# Patient Record
Sex: MALE | Race: WHITE | NOT HISPANIC OR LATINO | Employment: OTHER | URBAN - NONMETROPOLITAN AREA
[De-identification: names, ages, dates, MRNs, and addresses within clinical notes are randomized per-mention and may not be internally consistent; named-entity substitution may affect disease eponyms.]

---

## 2017-09-13 ENCOUNTER — HOSPITAL ENCOUNTER (OUTPATIENT)
Dept: GENERAL RADIOLOGY | Facility: HOSPITAL | Age: 52
Discharge: HOME | End: 2017-09-13
Admitting: FAMILY MEDICINE

## 2017-09-13 DIAGNOSIS — J40 BRONCHITIS: ICD-10-CM

## 2017-09-13 PROCEDURE — 71020 HC CHEST PA AND LATERAL: CPT

## 2017-09-13 PROCEDURE — 71020 XR CHEST PA AND LATERAL: CPT | Performed by: RADIOLOGY

## 2017-11-17 ENCOUNTER — OFFICE VISIT (OUTPATIENT)
Dept: NEUROSURGERY | Facility: CLINIC | Age: 52
End: 2017-11-17

## 2017-11-17 VITALS — BODY MASS INDEX: 39.65 KG/M2 | TEMPERATURE: 98 F | HEIGHT: 70 IN | WEIGHT: 277 LBS

## 2017-11-17 DIAGNOSIS — G89.29 CHRONIC BILATERAL LOW BACK PAIN, WITH SCIATICA PRESENCE UNSPECIFIED: ICD-10-CM

## 2017-11-17 DIAGNOSIS — Z98.890 HISTORY OF LUMBAR SURGERY: ICD-10-CM

## 2017-11-17 DIAGNOSIS — M51.36 DEGENERATIVE DISC DISEASE, LUMBAR: Primary | ICD-10-CM

## 2017-11-17 DIAGNOSIS — M54.5 CHRONIC BILATERAL LOW BACK PAIN, WITH SCIATICA PRESENCE UNSPECIFIED: ICD-10-CM

## 2017-11-17 PROBLEM — M54.50 CHRONIC BILATERAL LOW BACK PAIN: Status: ACTIVE | Noted: 2017-11-17

## 2017-11-17 PROCEDURE — 99203 OFFICE O/P NEW LOW 30 MIN: CPT | Performed by: NEUROLOGICAL SURGERY

## 2017-11-17 RX ORDER — LORATADINE 10 MG/1
CAPSULE, LIQUID FILLED ORAL
COMMUNITY
End: 2017-11-17 | Stop reason: SDUPTHER

## 2017-11-17 RX ORDER — METOPROLOL SUCCINATE 25 MG/1
25 TABLET, EXTENDED RELEASE ORAL DAILY
COMMUNITY
End: 2019-04-09 | Stop reason: SDUPTHER

## 2017-11-17 RX ORDER — HYDROGEN PEROXIDE 2.65 ML/100ML
LIQUID ORAL; TOPICAL
COMMUNITY
Start: 2017-10-25 | End: 2019-02-25 | Stop reason: SDUPTHER

## 2017-11-17 RX ORDER — ISOSORBIDE MONONITRATE 30 MG/1
30 TABLET, EXTENDED RELEASE ORAL DAILY
COMMUNITY
End: 2018-07-19

## 2017-11-17 RX ORDER — AMLODIPINE BESYLATE AND BENAZEPRIL HYDROCHLORIDE 5; 10 MG/1; MG/1
1 CAPSULE ORAL DAILY
COMMUNITY
End: 2018-07-19

## 2017-11-17 RX ORDER — LISINOPRIL AND HYDROCHLOROTHIAZIDE 25; 20 MG/1; MG/1
TABLET ORAL
COMMUNITY
Start: 2017-10-25 | End: 2018-10-18

## 2017-11-17 RX ORDER — RABEPRAZOLE SODIUM 20 MG/1
20 TABLET, DELAYED RELEASE ORAL DAILY
COMMUNITY
End: 2019-04-09 | Stop reason: SDUPTHER

## 2017-11-17 RX ORDER — AMITRIPTYLINE HYDROCHLORIDE 25 MG/1
25 TABLET, FILM COATED ORAL NIGHTLY
COMMUNITY
End: 2017-11-17 | Stop reason: SDUPTHER

## 2017-11-17 RX ORDER — CLOPIDOGREL BISULFATE 75 MG/1
75 TABLET ORAL DAILY
COMMUNITY
End: 2017-11-17 | Stop reason: SDUPTHER

## 2017-11-17 RX ORDER — ASPIRIN 325 MG
325 TABLET ORAL DAILY
COMMUNITY
End: 2017-11-17 | Stop reason: SDUPTHER

## 2017-11-17 RX ORDER — ALBUTEROL SULFATE 2.5 MG/3ML
2.5 SOLUTION RESPIRATORY (INHALATION) EVERY 4 HOURS PRN
COMMUNITY
End: 2019-04-09 | Stop reason: SDUPTHER

## 2017-11-17 RX ORDER — DOXEPIN HYDROCHLORIDE 25 MG/1
CAPSULE ORAL
COMMUNITY
Start: 2017-10-14 | End: 2018-07-19

## 2017-11-17 RX ORDER — ROSUVASTATIN CALCIUM 10 MG/1
10 TABLET, COATED ORAL DAILY
COMMUNITY
End: 2019-04-09 | Stop reason: SDUPTHER

## 2017-11-17 RX ORDER — ALBUTEROL SULFATE 90 UG/1
AEROSOL, METERED RESPIRATORY (INHALATION)
COMMUNITY
Start: 2017-10-14 | End: 2019-04-09 | Stop reason: SDUPTHER

## 2017-11-17 RX ORDER — RANOLAZINE 500 MG/1
500 TABLET, EXTENDED RELEASE ORAL 2 TIMES DAILY
COMMUNITY
End: 2019-04-09 | Stop reason: SDUPTHER

## 2017-11-17 RX ORDER — ONDANSETRON 4 MG/1
4 TABLET, ORALLY DISINTEGRATING ORAL EVERY 8 HOURS PRN
COMMUNITY
End: 2021-03-02

## 2017-11-17 RX ORDER — LISINOPRIL 20 MG/1
20 TABLET ORAL DAILY
COMMUNITY
End: 2017-11-17 | Stop reason: SDUPTHER

## 2017-11-17 RX ORDER — AMLODIPINE BESYLATE 5 MG/1
TABLET ORAL
COMMUNITY
Start: 2017-10-25 | End: 2019-04-09 | Stop reason: SDUPTHER

## 2017-11-17 RX ORDER — LORATADINE 10 MG/1
TABLET ORAL
COMMUNITY
Start: 2017-11-16 | End: 2019-04-09

## 2017-11-17 NOTE — PROGRESS NOTES
Dipak Isis  1965  [id number]      Chief Complaint   Patient presents with   • Numbness       HISTORY OF PRESENT ILLNESS:  [This is a 52-year-old male who has had 8 surgeries performed on his lumbar spine since 1990.  This includes both a posterior and anterior stabilization with pedicle screws and interbody devices.  He is had a epidural steroid injection which has not helped.  He is had a spinal cord stimulator which has not helped.  This was withdrawn and his symptoms have gotten progressively worse.  He has severe unrelenting back pain.  He has hyperesthesia in both lower extremities which she is had for some time.  He attended a pain management service in [place] which treated him quite well with medication.  He is relocated to [place].  He is referred for neurosurgical consultation. ]    Past Medical History:   Diagnosis Date   • Anxiety    • GENTILE (chronic airflow obstruction)    • DDD (degenerative disc disease), cervical    • Depression    • Dyslipidemia    • Hypertension    • Insomnia    • Spinal stenosis        Past Surgical History:   Procedure Laterality Date   • BACK SURGERY  1990    (total of 8 back surgeries) [facility]   • CORONARY ANGIOPLASTY WITH STENT PLACEMENT     • NECK SURGERY     • OTHER SURGICAL HISTORY      elbow surgery   • SPINAL CORD STIMULATOR IMPLANT  2015    and removal   • TOTAL KNEE ARTHROPLASTY         Family History   Problem Relation Age of Onset   • Heart disease Mother    • Heart disease Father    • Diabetes Father        Social History     Social History   • Marital status:      Spouse name: N/A   • Number of children: N/A   • Years of education: N/A     Occupational History   • Not on file.     Social History Main Topics   • Smoking status: Current Every Day Smoker     Packs/day: 2.00   • Smokeless tobacco: Never Used   • Alcohol use Not on file   • Drug use: Not on file   • Sexual activity: Defer     Other Topics Concern   • Not on file     Social History  Narrative       Allergies   Allergen Reactions   • Penicillins    • Tape      silk         Current Outpatient Prescriptions:   •  albuterol (PROVENTIL) (2.5 MG/3ML) 0.083% nebulizer solution, Take 2.5 mg by nebulization Every 4 (Four) Hours As Needed for Wheezing., Disp: , Rfl:   •  amLODIPine (NORVASC) 5 MG tablet, , Disp: , Rfl:   •  amLODIPine-benazepril (LOTREL 5-10) 5-10 MG per capsule, Take 1 capsule by mouth Daily., Disp: , Rfl:   •  doxepin (SINEquan) 25 MG capsule, , Disp: , Rfl:   •  EQ ASPIRIN ADULT LOW DOSE 81 MG EC tablet, , Disp: , Rfl:   •  isosorbide mononitrate (IMDUR) 30 MG 24 hr tablet, Take 30 mg by mouth Daily., Disp: , Rfl:   •  lisinopril-hydrochlorothiazide (PRINZIDE,ZESTORETIC) 20-25 MG per tablet, , Disp: , Rfl:   •  loratadine (CLARITIN) 10 MG tablet, , Disp: , Rfl:   •  metoprolol succinate XL (TOPROL-XL) 25 MG 24 hr tablet, Take 25 mg by mouth Daily., Disp: , Rfl:   •  ondansetron ODT (ZOFRAN-ODT) 4 MG disintegrating tablet, Take 4 mg by mouth Every 8 (Eight) Hours As Needed for Nausea or Vomiting., Disp: , Rfl:   •  RABEprazole (ACIPHEX) 20 MG EC tablet, Take 20 mg by mouth Daily., Disp: , Rfl:   •  ranolazine (RANEXA) 500 MG 12 hr tablet, Take 500 mg by mouth 2 (Two) Times a Day., Disp: , Rfl:   •  rosuvastatin (CRESTOR) 10 MG tablet, Take 10 mg by mouth Daily., Disp: , Rfl:   •  VENTOLIN  (90 Base) MCG/ACT inhaler, , Disp: , Rfl:     Review of Systems   Constitutional: Negative for activity change, appetite change, chills, diaphoresis, fatigue, fever and unexpected weight change.   HENT: Negative for congestion, dental problem, drooling, ear discharge, ear pain, facial swelling, hearing loss, mouth sores, nosebleeds, postnasal drip, rhinorrhea, sinus pressure, sneezing, sore throat, tinnitus, trouble swallowing and voice change.    Eyes: Negative for photophobia, pain, discharge, redness, itching and visual disturbance.   Respiratory: Negative for apnea, cough, choking, chest  "tightness, shortness of breath, wheezing and stridor.    Cardiovascular: Negative for chest pain, palpitations and leg swelling.   Gastrointestinal: Negative for abdominal distention, abdominal pain, anal bleeding, blood in stool, constipation, diarrhea, nausea, rectal pain and vomiting.   Endocrine: Negative for cold intolerance, heat intolerance, polydipsia, polyphagia and polyuria.   Genitourinary: Negative for decreased urine volume, difficulty urinating, dysuria, enuresis, flank pain, frequency, genital sores, hematuria and urgency.   Musculoskeletal: Positive for back pain, neck pain and neck stiffness. Negative for arthralgias, gait problem, joint swelling and myalgias.   Skin: Negative for color change, pallor, rash and wound.   Allergic/Immunologic: Negative for environmental allergies, food allergies and immunocompromised state.   Neurological: Positive for weakness, numbness and headaches. Negative for dizziness, tremors, seizures, syncope, facial asymmetry, speech difficulty and light-headedness.   Hematological: Negative for adenopathy. Does not bruise/bleed easily.   Psychiatric/Behavioral: Negative for agitation, behavioral problems, confusion, decreased concentration, dysphoric mood, hallucinations, self-injury, sleep disturbance and suicidal ideas. The patient is not nervous/anxious and is not hyperactive.        Vitals:    11/17/17 1012   Temp: 98 °F (36.7 °C)   Weight: 277 lb (126 kg)   Height: 70\" (177.8 cm)       Neurological Examination:  Mental status/speech: The patient is alert and oriented.  Speech is clear without aphysia or dysarthria.  No overt cognitive deficits.    Cranial nerve examination:    Olfaction: Smell is intact.  Vision: Vision is intact without visual field abnormalities.  Funduscopic examination is normal.  No pupillary irregularity.  Ocular motor examination: The extraocular muscles are intact.  There is no diplopia.  The pupil is round and reactive to both light and " accommodation.  There is no nystagmus.  Facial movement/sensation: There is no facial weakness.  Sensation is intact in the first, second, and third divisions of the trigeminal nerve.  The corneal reflex is intact.  Auditory: Hearing is intact to finger rub bilaterally.  Cranial nerves IX, X, XI, XII: Phonation is normal.  No dysphagia.  Tongue is protruded in the midline without atrophy.  The gag reflex is intact.  Shoulder shrug is normal.    Musculoligamentous ligamentous examination: He is slightly obese with limitation of range of motion.  He walks in a flexed position.  Straight leg raising Crandall flip Scovill test are negative however.  His strength is intact.  He has hyperesthesia in both lower extremities.  The Achilles reflex are absent.  The patellar reflexes are intact.    Medical Decision Making:     Diagnostic Data Set:  I have reviewed cervical sets of lumbar MRI.  He is had spinal fusion from L4 through the sacrum both anteriorly and inferiorly.  He has mild spinal stenosis at L3-L4 and L2-L3.      Assessment:  Postlaminectomy syndrome          Recommendations:  In my opinion he does not have a condition that can be helped with spinal surgery.  I would strongly recommend referral to pain management for pharmacological management of his pain.  I do not think there is an another viable option at this time that assures him of any significant improvement.        I greatly appreciate the opportunity to see and evaluate this individual.  If you have questions or concerns regarding issues that I may have overlooked please call me at any time: [phone].  Shayne Lam M.D.  [facility]  [address] [place] [address]    Scribed for Yefri Lam MD by Lei Young CMA. 11/17/2017  10:44 AM     I have read and concur with the information provided by the scribe.  Yefri Lam MD

## 2018-07-19 ENCOUNTER — OFFICE VISIT (OUTPATIENT)
Dept: SURGERY | Facility: CLINIC | Age: 53
End: 2018-07-19

## 2018-07-19 VITALS
RESPIRATION RATE: 18 BRPM | WEIGHT: 299.6 LBS | HEIGHT: 70 IN | BODY MASS INDEX: 42.89 KG/M2 | SYSTOLIC BLOOD PRESSURE: 125 MMHG | HEART RATE: 81 BPM | DIASTOLIC BLOOD PRESSURE: 77 MMHG | TEMPERATURE: 98.6 F | OXYGEN SATURATION: 95 %

## 2018-07-19 DIAGNOSIS — R19.00 ABDOMINAL WALL BULGE: ICD-10-CM

## 2018-07-19 DIAGNOSIS — R10.84 GENERALIZED ABDOMINAL PAIN: Primary | ICD-10-CM

## 2018-07-19 PROCEDURE — 99243 OFF/OP CNSLTJ NEW/EST LOW 30: CPT | Performed by: SURGERY

## 2018-07-19 RX ORDER — NITROGLYCERIN 80 MG/1
1 PATCH TRANSDERMAL DAILY
COMMUNITY
End: 2018-10-18

## 2018-07-19 RX ORDER — FLUOXETINE 10 MG/1
10 CAPSULE ORAL DAILY
COMMUNITY
End: 2018-11-01 | Stop reason: SDUPTHER

## 2018-07-19 RX ORDER — CLOPIDOGREL BISULFATE 75 MG/1
75 TABLET ORAL DAILY
COMMUNITY
End: 2019-04-09 | Stop reason: SDUPTHER

## 2018-07-19 NOTE — PROGRESS NOTES
7/19/2018    Patient Information  Dipak Marinelli  [address] [place] [address] [place] [address]  1965  [phone] (home)     Chief Complaint   Patient presents with   • Hernia       HPI  Patient is a 52-year-old white male who reports he thinks he has a hernia in his abdominal wall.  He says there is a knuckle stuff that sticks out.  In its getting bigger.  He saw Dr. Arana about this within the last 6 months.  He reports that Dr. Arana obtain a CAT scan and told him he had an inguinal hernia but there is no hernia in his abdominal wall.  Patient cyst that is not there and that its getting bigger since he saw her area    Review of Systems   Constitutional: Positive for unexpected weight change.   HENT: Negative.    Eyes: Positive for visual disturbance.   Gastrointestinal: Positive for abdominal pain.   Endocrine: Negative.    Genitourinary: Negative.    Musculoskeletal: Positive for back pain and joint swelling.   Skin: Negative.    Allergic/Immunologic: Negative.    Neurological: Positive for numbness.   Hematological: Bruises/bleeds easily.   Psychiatric/Behavioral: Positive for sleep disturbance. The patient is nervous/anxious.      The Review of Systems has been reviewed and confirmed.    Patient Active Problem List   Diagnosis   • Degenerative disc disease, lumbar   • History of lumbar surgery   • Chronic bilateral low back pain         Past Medical History:   Diagnosis Date   • Anxiety    • Cancer (CMS/HCC)    • GENTILE (chronic airflow obstruction) (CMS/HCC)    • DDD (degenerative disc disease), cervical    • Depression    • Dyslipidemia    • Hypertension    • Insomnia    • Spinal stenosis          Past Surgical History:   Procedure Laterality Date   • BACK SURGERY  1990    (total of 8 back surgeries) [facility]   • CORONARY ANGIOPLASTY WITH STENT PLACEMENT     • NECK SURGERY     • OTHER SURGICAL HISTORY      elbow surgery   • SPINAL CORD STIMULATOR IMPLANT  2015    and removal   • TOTAL KNEE ARTHROPLASTY    "        Family History   Problem Relation Age of Onset   • Heart disease Mother    • Heart disease Father    • Diabetes Father          Social History   Substance Use Topics   • Smoking status: Current Every Day Smoker     Packs/day: 2.00   • Smokeless tobacco: Never Used   • Alcohol use Not on file       Current Outpatient Prescriptions   Medication Sig Dispense Refill   • albuterol (PROVENTIL) (2.5 MG/3ML) 0.083% nebulizer solution Take 2.5 mg by nebulization Every 4 (Four) Hours As Needed for Wheezing.     • amLODIPine (NORVASC) 5 MG tablet      • clopidogrel (PLAVIX) 75 MG tablet Take 75 mg by mouth Daily.     • EQ ASPIRIN ADULT LOW DOSE 81 MG EC tablet      • FLUoxetine (PROzac) 10 MG capsule Take 10 mg by mouth Daily.     • lisinopril-hydrochlorothiazide (PRINZIDE,ZESTORETIC) 20-25 MG per tablet      • loratadine (CLARITIN) 10 MG tablet      • metoprolol succinate XL (TOPROL-XL) 25 MG 24 hr tablet Take 25 mg by mouth Daily.     • nitroglycerin (NITRODUR) 0.4 MG/HR patch Place 1 patch on the skin Daily.     • ondansetron ODT (ZOFRAN-ODT) 4 MG disintegrating tablet Take 4 mg by mouth Every 8 (Eight) Hours As Needed for Nausea or Vomiting.     • RABEprazole (ACIPHEX) 20 MG EC tablet Take 20 mg by mouth Daily.     • ranolazine (RANEXA) 500 MG 12 hr tablet Take 500 mg by mouth 2 (Two) Times a Day.     • rosuvastatin (CRESTOR) 10 MG tablet Take 10 mg by mouth Daily.     • VENTOLIN  (90 Base) MCG/ACT inhaler        No current facility-administered medications for this visit.          Allergies  Penicillins and Tape    /77   Pulse 81   Temp 98.6 °F (37 °C)   Resp 18   Ht 177.8 cm (70\")   Wt 136 kg (299 lb 9.6 oz)   SpO2 95%   BMI 42.99 kg/m²      Physical Exam   Constitutional: He is oriented to person, place, and time. He appears well-developed and well-nourished. No distress.   HENT:   Head: Normocephalic.   Right Ear: External ear normal.   Left Ear: External ear normal.   Nose: Nose normal. "   Mouth/Throat: Oropharynx is clear and moist.   Eyes: Conjunctivae and EOM are normal. Right eye exhibits no discharge. Left eye exhibits no discharge.   Neck: Normal range of motion. No JVD present. No tracheal deviation present. No thyromegaly present.   Cardiovascular: Normal rate, regular rhythm, normal heart sounds and intact distal pulses.  Exam reveals no gallop and no friction rub.    No murmur heard.  Pulmonary/Chest: Effort normal and breath sounds normal. No stridor. No respiratory distress. He has no wheezes. He has no rales. He exhibits no tenderness.   Abdominal: Soft. Bowel sounds are normal. He exhibits no distension and no mass. There is no tenderness. There is no rebound and no guarding. No hernia.   No evidence of hernia in his abdominal wall   Genitourinary: Rectal exam shows guaiac negative stool.   Musculoskeletal: Normal range of motion. He exhibits no edema, tenderness or deformity.   Lymphadenopathy:     He has no cervical adenopathy.   Neurological: He is alert and oriented to person, place, and time. He has normal reflexes. He displays normal reflexes. No cranial nerve deficit. He exhibits normal muscle tone. Coordination normal.   Skin: Skin is warm and dry. No rash noted. He is not diaphoretic. No erythema. No pallor.   Psychiatric: He has a normal mood and affect. His behavior is normal. Judgment and thought content normal.             Assessment   Abdominal wall pain        Plan     CT scan of abdomen and pelvis with by mouth and IV contrast  Patient's Body mass index is 42.99 kg/m². BMI is above normal parameters. Recommendations include: educational material.      Chadwick Mobley MD

## 2018-07-23 ENCOUNTER — TELEPHONE (OUTPATIENT)
Dept: SURGERY | Facility: CLINIC | Age: 53
End: 2018-07-23

## 2018-07-23 NOTE — TELEPHONE ENCOUNTER
Called pt and made aware of CT scheduled 07/2718 at 8:30 at [facility]. Npo 3 HOUR PRIOR

## 2018-07-27 ENCOUNTER — TELEPHONE (OUTPATIENT)
Dept: SURGERY | Facility: CLINIC | Age: 53
End: 2018-07-27

## 2018-07-27 NOTE — TELEPHONE ENCOUNTER
Pt called and states he missed his appt at [facility] for CT scan this morning and needs to schedule. I gave him the number to [facility] scheduling and he will call them to r/s his test.

## 2018-08-16 DIAGNOSIS — R19.00 ABDOMINAL WALL BULGE: ICD-10-CM

## 2018-08-16 DIAGNOSIS — R10.84 GENERALIZED ABDOMINAL PAIN: ICD-10-CM

## 2018-08-28 ENCOUNTER — OFFICE VISIT (OUTPATIENT)
Dept: SURGERY | Facility: CLINIC | Age: 53
End: 2018-08-28

## 2018-08-28 VITALS
TEMPERATURE: 98.7 F | BODY MASS INDEX: 40.37 KG/M2 | WEIGHT: 282 LBS | DIASTOLIC BLOOD PRESSURE: 90 MMHG | HEIGHT: 70 IN | HEART RATE: 69 BPM | SYSTOLIC BLOOD PRESSURE: 144 MMHG | RESPIRATION RATE: 17 BRPM

## 2018-08-28 DIAGNOSIS — R10.84 GENERALIZED ABDOMINAL PAIN: Primary | ICD-10-CM

## 2018-08-28 DIAGNOSIS — E66.01 MORBID OBESITY (HCC): ICD-10-CM

## 2018-08-28 DIAGNOSIS — R12 HEARTBURN: ICD-10-CM

## 2018-08-28 DIAGNOSIS — K40.90 NON-RECURRENT UNILATERAL INGUINAL HERNIA WITHOUT OBSTRUCTION OR GANGRENE: ICD-10-CM

## 2018-08-28 PROCEDURE — 99214 OFFICE O/P EST MOD 30 MIN: CPT | Performed by: SURGERY

## 2018-08-28 NOTE — PROGRESS NOTES
8/28/2018    Patient Information  Dipak Marinelli  [address] [place] [id number]  1965  [phone] (home)     Chief Complaint   Patient presents with   • Follow-up     FU CT Scan       HPI  Patient's 53-year-old white male who I saw a week ago because of complaints of abdominal pain and that he thought he had abdominal wall hernia.  A CT scan has been obtained which shows only evidence of a left inguinal hernia containing fat.  His hernia is asymptomatic.  His main complaint is epigastric pain and severe heartburn and take his medications.  He says he's been told he has a hiatal hernia.  Symptoms    Review of Systems:  The Past medical history, family history, social history, medication list, allergies, and Review of Systems has been reviewed and confirmed.    General: weight loss   Integumentary: negative  Eyes: eyesight problems  ENT: negative  Respiratory: negative  Gastrointestinal: abdominal pain  Cardiovascular: negative  Neurological: numbness  Psychiatric: anxiety and insomnia  Hematologic/Lymphatic: easy bleeding and easy bruising  Genitourinary: negative  Musculoskeletal: joint swelling and back pain  Endocrine: negative  Breasts: negative      Patient Active Problem List   Diagnosis   • Degenerative disc disease, lumbar   • History of lumbar surgery   • Chronic bilateral low back pain         Past Medical History:   Diagnosis Date   • Anxiety    • Cancer (CMS/HCC)    • GENTILE (chronic airflow obstruction) (CMS/HCC)    • DDD (degenerative disc disease), cervical    • Depression    • Dyslipidemia    • Hypertension    • Insomnia    • Spinal stenosis          Past Surgical History:   Procedure Laterality Date   • BACK SURGERY  1990    (total of 8 back surgeries) [facility]   • CORONARY ANGIOPLASTY WITH STENT PLACEMENT     • NECK SURGERY     • OTHER SURGICAL HISTORY      elbow surgery   • SPINAL CORD STIMULATOR IMPLANT  2015    and removal   • TOTAL KNEE ARTHROPLASTY           Family History   Problem  "Relation Age of Onset   • Heart disease Mother    • Heart disease Father    • Diabetes Father          Social History   Substance Use Topics   • Smoking status: Current Every Day Smoker     Packs/day: 2.00   • Smokeless tobacco: Never Used   • Alcohol use No       Current Outpatient Prescriptions   Medication Sig Dispense Refill   • albuterol (PROVENTIL) (2.5 MG/3ML) 0.083% nebulizer solution Take 2.5 mg by nebulization Every 4 (Four) Hours As Needed for Wheezing.     • amLODIPine (NORVASC) 5 MG tablet      • clopidogrel (PLAVIX) 75 MG tablet Take 75 mg by mouth Daily.     • EQ ASPIRIN ADULT LOW DOSE 81 MG EC tablet      • FLUoxetine (PROzac) 10 MG capsule Take 10 mg by mouth Daily.     • lisinopril-hydrochlorothiazide (PRINZIDE,ZESTORETIC) 20-25 MG per tablet      • loratadine (CLARITIN) 10 MG tablet      • metoprolol succinate XL (TOPROL-XL) 25 MG 24 hr tablet Take 25 mg by mouth Daily.     • nitroglycerin (NITRODUR) 0.4 MG/HR patch Place 1 patch on the skin Daily.     • ondansetron ODT (ZOFRAN-ODT) 4 MG disintegrating tablet Take 4 mg by mouth Every 8 (Eight) Hours As Needed for Nausea or Vomiting.     • RABEprazole (ACIPHEX) 20 MG EC tablet Take 20 mg by mouth Daily.     • ranolazine (RANEXA) 500 MG 12 hr tablet Take 500 mg by mouth 2 (Two) Times a Day.     • rosuvastatin (CRESTOR) 10 MG tablet Take 10 mg by mouth Daily.     • VENTOLIN  (90 Base) MCG/ACT inhaler        No current facility-administered medications for this visit.          Allergies  Penicillins and Tape    /90   Pulse 69   Temp 98.7 °F (37.1 °C)   Resp 17   Ht 177.8 cm (70\")   Wt 128 kg (282 lb)   BMI 40.46 kg/m²      Physical Exam   Constitutional: He is oriented to person, place, and time. He appears well-developed and well-nourished. No distress.   HENT:   Head: Normocephalic.   Right Ear: External ear normal.   Left Ear: External ear normal.   Nose: Nose normal.   Mouth/Throat: Oropharynx is clear and moist.   Eyes: " Conjunctivae and EOM are normal. Right eye exhibits no discharge. Left eye exhibits no discharge.   Neck: Normal range of motion. No JVD present. No tracheal deviation present. No thyromegaly present.   Cardiovascular: Normal rate, regular rhythm, normal heart sounds and intact distal pulses.  Exam reveals no gallop and no friction rub.    No murmur heard.  Pulmonary/Chest: Effort normal and breath sounds normal. No stridor. No respiratory distress. He has no wheezes. He has no rales. He exhibits no tenderness.   Abdominal: Soft. Bowel sounds are normal. He exhibits no distension and no mass. There is no tenderness. There is no rebound and no guarding. No hernia.   Genitourinary: Rectal exam shows guaiac negative stool.   Musculoskeletal: Normal range of motion. He exhibits no edema, tenderness or deformity.   Lymphadenopathy:     He has no cervical adenopathy.   Neurological: He is alert and oriented to person, place, and time. He has normal reflexes. He displays normal reflexes. No cranial nerve deficit. He exhibits normal muscle tone. Coordination normal.   Skin: Skin is warm and dry. No rash noted. He is not diaphoretic. No erythema. No pallor.   Psychiatric: He has a normal mood and affect. His behavior is normal. Judgment and thought content normal.                 ASSESSMENT  Abdominal pain  Heartburn  Morbid obesity  Left inguinal hernia      PLAN    EGD with pH    Patient's Body mass index is 40.46 kg/m². BMI is above normal parameters. Recommendations include: educational material.           This document signed by Chadwick Mobley MD August 28, 2018 10:04 AM

## 2018-08-29 PROBLEM — E66.01 MORBID OBESITY (HCC): Status: ACTIVE | Noted: 2018-08-29

## 2018-08-29 PROBLEM — R12 HEARTBURN: Status: ACTIVE | Noted: 2018-08-29

## 2018-08-29 PROBLEM — R10.84 GENERALIZED ABDOMINAL PAIN: Status: ACTIVE | Noted: 2018-08-29

## 2018-08-29 PROBLEM — K40.90 NON-RECURRENT UNILATERAL INGUINAL HERNIA WITHOUT OBSTRUCTION OR GANGRENE: Status: ACTIVE | Noted: 2018-08-29

## 2018-09-14 ENCOUNTER — TELEPHONE (OUTPATIENT)
Dept: SURGERY | Facility: CLINIC | Age: 53
End: 2018-09-14

## 2018-09-17 ENCOUNTER — TELEPHONE (OUTPATIENT)
Dept: SURGERY | Facility: CLINIC | Age: 53
End: 2018-09-17

## 2018-09-17 ENCOUNTER — ANESTHESIA EVENT (OUTPATIENT)
Dept: PERIOP | Facility: HOSPITAL | Age: 53
End: 2018-09-17

## 2018-09-17 ENCOUNTER — ANESTHESIA (OUTPATIENT)
Dept: PERIOP | Facility: HOSPITAL | Age: 53
End: 2018-09-17

## 2018-09-19 ENCOUNTER — HOSPITAL ENCOUNTER (OUTPATIENT)
Facility: HOSPITAL | Age: 53
Setting detail: HOSPITAL OUTPATIENT SURGERY
Discharge: HOME | End: 2018-09-19
Attending: SURGERY | Admitting: SURGERY

## 2018-09-19 VITALS
HEIGHT: 70 IN | WEIGHT: 280 LBS | SYSTOLIC BLOOD PRESSURE: 120 MMHG | TEMPERATURE: 97 F | HEART RATE: 74 BPM | DIASTOLIC BLOOD PRESSURE: 73 MMHG | RESPIRATION RATE: 18 BRPM | BODY MASS INDEX: 40.09 KG/M2 | OXYGEN SATURATION: 94 %

## 2018-09-19 DIAGNOSIS — R12 HEARTBURN: ICD-10-CM

## 2018-09-19 DIAGNOSIS — K40.90 NON-RECURRENT UNILATERAL INGUINAL HERNIA WITHOUT OBSTRUCTION OR GANGRENE: ICD-10-CM

## 2018-09-19 DIAGNOSIS — R10.84 GENERALIZED ABDOMINAL PAIN: ICD-10-CM

## 2018-09-19 DIAGNOSIS — E66.01 MORBID OBESITY (HCC): ICD-10-CM

## 2018-09-19 PROCEDURE — 25010000002 PROPOFOL 10 MG/ML EMULSION: Performed by: NURSE ANESTHETIST, CERTIFIED REGISTERED

## 2018-09-19 PROCEDURE — 25010000002 FENTANYL CITRATE (PF) 100 MCG/2ML SOLUTION: Performed by: NURSE ANESTHETIST, CERTIFIED REGISTERED

## 2018-09-19 PROCEDURE — 94799 UNLISTED PULMONARY SVC/PX: CPT

## 2018-09-19 PROCEDURE — 25010000002 MIDAZOLAM PER 1 MG: Performed by: NURSE ANESTHETIST, CERTIFIED REGISTERED

## 2018-09-19 PROCEDURE — 43239 EGD BIOPSY SINGLE/MULTIPLE: CPT | Performed by: SURGERY

## 2018-09-19 PROCEDURE — 25010000002 PROPOFOL 1000 MG/ML EMULSION: Performed by: NURSE ANESTHETIST, CERTIFIED REGISTERED

## 2018-09-19 PROCEDURE — 94640 AIRWAY INHALATION TREATMENT: CPT

## 2018-09-19 RX ORDER — SODIUM CHLORIDE, SODIUM LACTATE, POTASSIUM CHLORIDE, CALCIUM CHLORIDE 600; 310; 30; 20 MG/100ML; MG/100ML; MG/100ML; MG/100ML
125 INJECTION, SOLUTION INTRAVENOUS CONTINUOUS
Status: DISCONTINUED | OUTPATIENT
Start: 2018-09-19 | End: 2018-09-19 | Stop reason: HOSPADM

## 2018-09-19 RX ORDER — MIDAZOLAM HYDROCHLORIDE 1 MG/ML
INJECTION INTRAMUSCULAR; INTRAVENOUS AS NEEDED
Status: DISCONTINUED | OUTPATIENT
Start: 2018-09-19 | End: 2018-09-19 | Stop reason: SURG

## 2018-09-19 RX ORDER — LIDOCAINE HYDROCHLORIDE 20 MG/ML
INJECTION, SOLUTION INFILTRATION; PERINEURAL AS NEEDED
Status: DISCONTINUED | OUTPATIENT
Start: 2018-09-19 | End: 2018-09-19 | Stop reason: SURG

## 2018-09-19 RX ORDER — ONDANSETRON 2 MG/ML
4 INJECTION INTRAMUSCULAR; INTRAVENOUS ONCE AS NEEDED
Status: DISCONTINUED | OUTPATIENT
Start: 2018-09-19 | End: 2018-09-19 | Stop reason: HOSPADM

## 2018-09-19 RX ORDER — FENTANYL CITRATE 50 UG/ML
50 INJECTION, SOLUTION INTRAMUSCULAR; INTRAVENOUS
Status: DISCONTINUED | OUTPATIENT
Start: 2018-09-19 | End: 2018-09-19 | Stop reason: HOSPADM

## 2018-09-19 RX ORDER — SODIUM CHLORIDE 0.9 % (FLUSH) 0.9 %
1-10 SYRINGE (ML) INJECTION AS NEEDED
Status: DISCONTINUED | OUTPATIENT
Start: 2018-09-19 | End: 2018-09-19 | Stop reason: HOSPADM

## 2018-09-19 RX ORDER — PROPOFOL 10 MG/ML
VIAL (ML) INTRAVENOUS AS NEEDED
Status: DISCONTINUED | OUTPATIENT
Start: 2018-09-19 | End: 2018-09-19 | Stop reason: SURG

## 2018-09-19 RX ORDER — MEPERIDINE HYDROCHLORIDE 50 MG/ML
12.5 INJECTION INTRAMUSCULAR; INTRAVENOUS; SUBCUTANEOUS
Status: DISCONTINUED | OUTPATIENT
Start: 2018-09-19 | End: 2018-09-19 | Stop reason: HOSPADM

## 2018-09-19 RX ORDER — IPRATROPIUM BROMIDE AND ALBUTEROL SULFATE 2.5; .5 MG/3ML; MG/3ML
3 SOLUTION RESPIRATORY (INHALATION) ONCE AS NEEDED
Status: COMPLETED | OUTPATIENT
Start: 2018-09-19 | End: 2018-09-19

## 2018-09-19 RX ORDER — OXYCODONE HYDROCHLORIDE AND ACETAMINOPHEN 5; 325 MG/1; MG/1
1 TABLET ORAL ONCE AS NEEDED
Status: DISCONTINUED | OUTPATIENT
Start: 2018-09-19 | End: 2018-09-19 | Stop reason: HOSPADM

## 2018-09-19 RX ORDER — FENTANYL CITRATE 50 UG/ML
INJECTION, SOLUTION INTRAMUSCULAR; INTRAVENOUS AS NEEDED
Status: DISCONTINUED | OUTPATIENT
Start: 2018-09-19 | End: 2018-09-19 | Stop reason: SURG

## 2018-09-19 RX ADMIN — SODIUM CHLORIDE, POTASSIUM CHLORIDE, SODIUM LACTATE AND CALCIUM CHLORIDE: 600; 310; 30; 20 INJECTION, SOLUTION INTRAVENOUS at 10:07

## 2018-09-19 RX ADMIN — PROPOFOL 50 MG: 10 INJECTION, EMULSION INTRAVENOUS at 10:08

## 2018-09-19 RX ADMIN — MIDAZOLAM HYDROCHLORIDE 2 MG: 1 INJECTION, SOLUTION INTRAMUSCULAR; INTRAVENOUS at 10:07

## 2018-09-19 RX ADMIN — FENTANYL CITRATE 100 MCG: 50 INJECTION INTRAMUSCULAR; INTRAVENOUS at 10:07

## 2018-09-19 RX ADMIN — IPRATROPIUM BROMIDE AND ALBUTEROL SULFATE 3 ML: .5; 3 SOLUTION RESPIRATORY (INHALATION) at 10:34

## 2018-09-19 RX ADMIN — PROPOFOL 140 MCG/KG/MIN: 10 INJECTION, EMULSION INTRAVENOUS at 10:08

## 2018-09-19 RX ADMIN — LIDOCAINE HYDROCHLORIDE 60 MG: 20 INJECTION, SOLUTION INFILTRATION; PERINEURAL at 10:08

## 2018-09-19 NOTE — ANESTHESIA POSTPROCEDURE EVALUATION
Patient: Dipak Marinelli    Procedure Summary     Date:  09/19/18 Room / Location:   [facility]  /  [facility]    Anesthesia Start:  1007 Anesthesia Stop:  1020    Procedure:  ESOPHAGOGASTRODUODENOSCOPY AND BRAVO (N/A Esophagus) Diagnosis:       Morbid obesity (CMS/HCC)      Heartburn      Generalized abdominal pain      Non-recurrent unilateral inguinal hernia without obstruction or gangrene      (Morbid obesity (CMS/HCC) [E66.01])      (Heartburn [R12])      (Generalized abdominal pain [R10.84])      (Non-recurrent unilateral inguinal hernia without obstruction or gangrene [K40.90])    Surgeon:  Chadwick Mobley MD Provider:  Martín Vo MD    Anesthesia Type:  general ASA Status:  3          Anesthesia Type: general  Last vitals  BP   120/73 (09/19/18 1050)   Temp   97 °F (36.1 °C) (09/19/18 1020)   Pulse   74 (09/19/18 1050)   Resp   18 (09/19/18 1050)     SpO2   94 % (09/19/18 1050)     Post Anesthesia Care and Evaluation    Patient location during evaluation: PHASE II  Patient participation: complete - patient participated  Level of consciousness: awake and alert  Pain score: 1  Pain management: adequate  Airway patency: patent  Anesthetic complications: No anesthetic complications  PONV Status: controlled  Cardiovascular status: acceptable  Respiratory status: acceptable  Hydration status: acceptable

## 2018-09-19 NOTE — OP NOTE
Dipak Isis  9/19/2018      Operative Progress Note:    Surgeon and Assistant: Dr. Mobley    Pre-Operative Diagnosis: abdominal pain, heartburn, morbid obesity    Post-Operative Diagnosis: abdominal pain, heartburn, morbid obesity, hiatal hernia, ulcerative esophagitis    Procedure(s):  EGD with biopsies and placement of pH probe    Type of Anesthesia Administered: IV general    Estimated Blood Loss: Minimal    Blood Products: None    Specimen Obtained/Removed: duodenum, antrum, esophageal biopsies    Complication(s):  None    Graft/Implant/Prosthetics/Implanted Device/Transplants: pH probe at 34 cm    Indication: patient's 50-year-old white male with a BMI of 40    Findings: duodenum normal, stomach with large hiatal hernia, esophagitis with distal ulceration, GE junction at 40 cm    Operative Report:  Patient was taken operating room and placed in a left lateral decubitus position.  IV sedation was given per anesthesia.  Gastroscope was introduced and passed into the duodenum.  The scope was slowly withdrawn.  I examined the duodenum, stomach and esophagus thoroughly.  Biopsies were taken as indicated above.  Findings are listed as above.  PH probe was introduced and deployed at 34 cm.  Scope confirmed its placement.    Patient will be discharged home at recovery and will be seen back in the office in one week.       Electronically Signed by: Chadwick Mobley MD        Dictated Utilizing Dragon Dictation

## 2018-09-19 NOTE — ANESTHESIA PREPROCEDURE EVALUATION
Anesthesia Evaluation     Patient summary reviewed and Nursing notes reviewed   no history of anesthetic complications:  NPO Solid Status: > 8 hours  NPO Liquid Status: > 8 hours           Airway   Mallampati: II  TM distance: >3 FB  Neck ROM: full  no difficulty expected  Dental - normal exam   (+) poor dentition    Pulmonary - normal exam   (+) a smoker Current Smoked day of surgery, COPD, asthma,   (-) shortness of breath  Cardiovascular - normal exam  Exercise tolerance: good (4-7 METS)    NYHA Classification: II  PT is on anticoagulation therapy  Patient on routine beta blocker and Beta blocker given within 24 hours of surgery    (+) hypertension, CAD, cardiac stents (x9, last one placedd in 2015) hyperlipidemia,   (-) past MI, dysrhythmias, angina      Neuro/Psych  (+) psychiatric history Depression and Anxiety,     (-) seizures, CVA  GI/Hepatic/Renal/Endo    (+) morbid obesity, GERD,    (-)  obesity, liver disease, no renal disease, diabetes, hypothyroidism    Musculoskeletal     (+) back pain, chronic pain, neck pain, radiculopathy Left lower extremity, Right lower extremity, Left upper extremity and Right upper extremity  Abdominal  - normal exam    Bowel sounds: normal.   Substance History - negative use     OB/GYN negative ob/gyn ROS         Other   (+) arthritis   history of cancer                    Anesthesia Plan    ASA 3     general     intravenous induction   Anesthetic plan, all risks, benefits, and alternatives have been provided, discussed and informed consent has been obtained with: patient and spouse/significant other.  Use of blood products discussed with patient and spouse/significant other  Consented to blood products.

## 2018-09-19 NOTE — H&P (VIEW-ONLY)
8/28/2018    Patient Information  Dipak Marinelli  [address] [place] [id number]  1965  [phone] (home)     Chief Complaint   Patient presents with   • Follow-up     FU CT Scan       HPI  Patient's 53-year-old white male who I saw a week ago because of complaints of abdominal pain and that he thought he had abdominal wall hernia.  A CT scan has been obtained which shows only evidence of a left inguinal hernia containing fat.  His hernia is asymptomatic.  His main complaint is epigastric pain and severe heartburn and take his medications.  He says he's been told he has a hiatal hernia.  Symptoms    Review of Systems:  The Past medical history, family history, social history, medication list, allergies, and Review of Systems has been reviewed and confirmed.    General: weight loss   Integumentary: negative  Eyes: eyesight problems  ENT: negative  Respiratory: negative  Gastrointestinal: abdominal pain  Cardiovascular: negative  Neurological: numbness  Psychiatric: anxiety and insomnia  Hematologic/Lymphatic: easy bleeding and easy bruising  Genitourinary: negative  Musculoskeletal: joint swelling and back pain  Endocrine: negative  Breasts: negative      Patient Active Problem List   Diagnosis   • Degenerative disc disease, lumbar   • History of lumbar surgery   • Chronic bilateral low back pain         Past Medical History:   Diagnosis Date   • Anxiety    • Cancer (CMS/HCC)    • GENTILE (chronic airflow obstruction) (CMS/HCC)    • DDD (degenerative disc disease), cervical    • Depression    • Dyslipidemia    • Hypertension    • Insomnia    • Spinal stenosis          Past Surgical History:   Procedure Laterality Date   • BACK SURGERY  1990    (total of 8 back surgeries) [facility]   • CORONARY ANGIOPLASTY WITH STENT PLACEMENT     • NECK SURGERY     • OTHER SURGICAL HISTORY      elbow surgery   • SPINAL CORD STIMULATOR IMPLANT  2015    and removal   • TOTAL KNEE ARTHROPLASTY           Family History   Problem  "Relation Age of Onset   • Heart disease Mother    • Heart disease Father    • Diabetes Father          Social History   Substance Use Topics   • Smoking status: Current Every Day Smoker     Packs/day: 2.00   • Smokeless tobacco: Never Used   • Alcohol use No       Current Outpatient Prescriptions   Medication Sig Dispense Refill   • albuterol (PROVENTIL) (2.5 MG/3ML) 0.083% nebulizer solution Take 2.5 mg by nebulization Every 4 (Four) Hours As Needed for Wheezing.     • amLODIPine (NORVASC) 5 MG tablet      • clopidogrel (PLAVIX) 75 MG tablet Take 75 mg by mouth Daily.     • EQ ASPIRIN ADULT LOW DOSE 81 MG EC tablet      • FLUoxetine (PROzac) 10 MG capsule Take 10 mg by mouth Daily.     • lisinopril-hydrochlorothiazide (PRINZIDE,ZESTORETIC) 20-25 MG per tablet      • loratadine (CLARITIN) 10 MG tablet      • metoprolol succinate XL (TOPROL-XL) 25 MG 24 hr tablet Take 25 mg by mouth Daily.     • nitroglycerin (NITRODUR) 0.4 MG/HR patch Place 1 patch on the skin Daily.     • ondansetron ODT (ZOFRAN-ODT) 4 MG disintegrating tablet Take 4 mg by mouth Every 8 (Eight) Hours As Needed for Nausea or Vomiting.     • RABEprazole (ACIPHEX) 20 MG EC tablet Take 20 mg by mouth Daily.     • ranolazine (RANEXA) 500 MG 12 hr tablet Take 500 mg by mouth 2 (Two) Times a Day.     • rosuvastatin (CRESTOR) 10 MG tablet Take 10 mg by mouth Daily.     • VENTOLIN  (90 Base) MCG/ACT inhaler        No current facility-administered medications for this visit.          Allergies  Penicillins and Tape    /90   Pulse 69   Temp 98.7 °F (37.1 °C)   Resp 17   Ht 177.8 cm (70\")   Wt 128 kg (282 lb)   BMI 40.46 kg/m²      Physical Exam   Constitutional: He is oriented to person, place, and time. He appears well-developed and well-nourished. No distress.   HENT:   Head: Normocephalic.   Right Ear: External ear normal.   Left Ear: External ear normal.   Nose: Nose normal.   Mouth/Throat: Oropharynx is clear and moist.   Eyes: " Conjunctivae and EOM are normal. Right eye exhibits no discharge. Left eye exhibits no discharge.   Neck: Normal range of motion. No JVD present. No tracheal deviation present. No thyromegaly present.   Cardiovascular: Normal rate, regular rhythm, normal heart sounds and intact distal pulses.  Exam reveals no gallop and no friction rub.    No murmur heard.  Pulmonary/Chest: Effort normal and breath sounds normal. No stridor. No respiratory distress. He has no wheezes. He has no rales. He exhibits no tenderness.   Abdominal: Soft. Bowel sounds are normal. He exhibits no distension and no mass. There is no tenderness. There is no rebound and no guarding. No hernia.   Genitourinary: Rectal exam shows guaiac negative stool.   Musculoskeletal: Normal range of motion. He exhibits no edema, tenderness or deformity.   Lymphadenopathy:     He has no cervical adenopathy.   Neurological: He is alert and oriented to person, place, and time. He has normal reflexes. He displays normal reflexes. No cranial nerve deficit. He exhibits normal muscle tone. Coordination normal.   Skin: Skin is warm and dry. No rash noted. He is not diaphoretic. No erythema. No pallor.   Psychiatric: He has a normal mood and affect. His behavior is normal. Judgment and thought content normal.                 ASSESSMENT  Abdominal pain  Heartburn  Morbid obesity  Left inguinal hernia      PLAN    EGD with pH    Patient's Body mass index is 40.46 kg/m². BMI is above normal parameters. Recommendations include: educational material.           This document signed by Chadwick Mobley MD August 28, 2018 10:04 AM

## 2018-09-21 LAB
LAB AP CASE REPORT: NORMAL
PATH REPORT.FINAL DX SPEC: NORMAL

## 2018-10-02 ENCOUNTER — OFFICE VISIT (OUTPATIENT)
Dept: SURGERY | Facility: CLINIC | Age: 53
End: 2018-10-02

## 2018-10-02 VITALS
HEIGHT: 70 IN | RESPIRATION RATE: 17 BRPM | DIASTOLIC BLOOD PRESSURE: 81 MMHG | TEMPERATURE: 98 F | SYSTOLIC BLOOD PRESSURE: 129 MMHG | WEIGHT: 288 LBS | BODY MASS INDEX: 41.23 KG/M2 | HEART RATE: 56 BPM

## 2018-10-02 DIAGNOSIS — K44.9 HIATAL HERNIA: ICD-10-CM

## 2018-10-02 DIAGNOSIS — E66.01 MORBID OBESITY (HCC): Primary | ICD-10-CM

## 2018-10-02 DIAGNOSIS — K20.90 ESOPHAGITIS DETERMINED BY BIOPSY: ICD-10-CM

## 2018-10-02 DIAGNOSIS — K21.00 GASTROESOPHAGEAL REFLUX DISEASE WITH ESOPHAGITIS: ICD-10-CM

## 2018-10-02 PROCEDURE — 99214 OFFICE O/P EST MOD 30 MIN: CPT | Performed by: SURGERY

## 2018-10-02 NOTE — PROGRESS NOTES
10/2/2018    Patient Information  Dipak Marinelli  [address] [place] [address]  1965  [phone] (home)     Chief Complaint   Patient presents with   • Follow-up     FU EGD/BRAVO       HPI  Patient is a 53-year-old white male here for follow-up on EGD bravo.  EGD showed evidence of a large hiatal hernia with ulceration and distal esophagus.  DeMeester scores 58.  He is very symptomatic.  His BMI is 40.  Patient has coronary artery disease and COPD    Review of Systems:  The Past medical history, family history, social history, medication list, allergies, and Review of Systems has been reviewed and confirmed.    General: weight loss  Integumentary: negative  Eyes: eyesight problems  ENT: negative  Respiratory: negative  Gastrointestinal: abdominal pain  Cardiovascular: negative  Neurological: numbness  Psychiatric: anxiety and insomnia  Hematologic/Lymphatic: easy bleeding and easy bruising  Genitourinary: negative  Musculoskeletal: joint swelling and back pain  Endocrine: negative  Breasts: negative      Patient Active Problem List   Diagnosis   • Degenerative disc disease, lumbar   • History of lumbar surgery   • Chronic bilateral low back pain   • Morbid obesity (CMS/HCC)   • Heartburn   • Generalized abdominal pain   • Non-recurrent unilateral inguinal hernia without obstruction or gangrene         Past Medical History:   Diagnosis Date   • Anxiety    • Asthma    • Cancer (CMS/HCC)    • GENTILE (chronic airflow obstruction) (CMS/HCC)    • Coronary artery disease    • DDD (degenerative disc disease), cervical    • Depression    • Dyslipidemia    • GERD (gastroesophageal reflux disease)    • Hypertension    • Insomnia    • Spinal stenosis          Past Surgical History:   Procedure Laterality Date   • BACK SURGERY  1990    (total of 8 back surgeries) [facility]   • BRAVO PROCEDURE N/A 9/19/2018    Procedure: ESOPHAGOGASTRODUODENOSCOPY AND WILDER;  Surgeon: Chadwick Mobley MD;  Location: [facility];  Service:  Gastroenterology   • CARDIAC CATHETERIZATION     • CARDIOVASCULAR STRESS TEST     • CHOLECYSTECTOMY     • CORONARY ANGIOPLASTY WITH STENT PLACEMENT     • ELBOW PROCEDURE     • HERNIA REPAIR     • NECK SURGERY     • OTHER SURGICAL HISTORY      elbow surgery   • SPINAL CORD STIMULATOR IMPLANT  2015    and removal   • TOTAL KNEE ARTHROPLASTY           Family History   Problem Relation Age of Onset   • Heart disease Mother    • Heart disease Father    • Diabetes Father          Social History   Substance Use Topics   • Smoking status: Current Every Day Smoker     Packs/day: 2.00   • Smokeless tobacco: Never Used   • Alcohol use No       Current Outpatient Prescriptions   Medication Sig Dispense Refill   • albuterol (PROVENTIL) (2.5 MG/3ML) 0.083% nebulizer solution Take 2.5 mg by nebulization Every 4 (Four) Hours As Needed for Wheezing.     • amLODIPine (NORVASC) 5 MG tablet      • clopidogrel (PLAVIX) 75 MG tablet Take 75 mg by mouth Daily.     • EQ ASPIRIN ADULT LOW DOSE 81 MG EC tablet      • FLUoxetine (PROzac) 10 MG capsule Take 10 mg by mouth Daily.     • lisinopril-hydrochlorothiazide (PRINZIDE,ZESTORETIC) 20-25 MG per tablet      • loratadine (CLARITIN) 10 MG tablet      • metoprolol succinate XL (TOPROL-XL) 25 MG 24 hr tablet Take 25 mg by mouth Daily.     • nitroglycerin (NITRODUR) 0.4 MG/HR patch Place 1 patch on the skin Daily.     • ondansetron ODT (ZOFRAN-ODT) 4 MG disintegrating tablet Take 4 mg by mouth Every 8 (Eight) Hours As Needed for Nausea or Vomiting.     • RABEprazole (ACIPHEX) 20 MG EC tablet Take 20 mg by mouth Daily.     • ranolazine (RANEXA) 500 MG 12 hr tablet Take 500 mg by mouth 2 (Two) Times a Day.     • rosuvastatin (CRESTOR) 10 MG tablet Take 10 mg by mouth Daily.     • VENTOLIN  (90 Base) MCG/ACT inhaler        No current facility-administered medications for this visit.          Allergies  Penicillins and Tape    /81   Pulse 56   Temp 98 °F (36.7 °C)   Resp 17   Ht  "177.8 cm (70\")   Wt 131 kg (288 lb)   BMI 41.32 kg/m²      Physical Exam  Gen. 53-year-old white male known stress  Abdomen soft.            ASSESSMENT  Morbid obesity  Hiatal hernia  Esophagitis  Gastroesophageal reflux disease        PLAN    Had a long discussion with patient about weight loss which would likely solve his reflux symptomatology.  He has agreed to see Dr. Bourgeois, bariatric surgeon, in [place].  We will arrange this.  He is to return here when necessary    Patient's Body mass index is 41.32 kg/m². BMI is above normal parameters. Recommendations include: referral to a weight management program.           This document signed by Chadwick Mobley MD October 2, 2018 9:46 AM   "

## 2018-10-18 ENCOUNTER — OFFICE VISIT (OUTPATIENT)
Dept: FAMILY MEDICINE CLINIC | Facility: CLINIC | Age: 53
End: 2018-10-18

## 2018-10-18 VITALS
OXYGEN SATURATION: 96 % | BODY MASS INDEX: 39.71 KG/M2 | HEART RATE: 76 BPM | TEMPERATURE: 98.4 F | SYSTOLIC BLOOD PRESSURE: 119 MMHG | DIASTOLIC BLOOD PRESSURE: 81 MMHG | HEIGHT: 70 IN | WEIGHT: 277.4 LBS

## 2018-10-18 DIAGNOSIS — Z96.652 TOTAL KNEE REPLACEMENT STATUS, LEFT: ICD-10-CM

## 2018-10-18 DIAGNOSIS — I25.10 CORONARY ARTERY DISEASE INVOLVING NATIVE CORONARY ARTERY OF NATIVE HEART WITHOUT ANGINA PECTORIS: ICD-10-CM

## 2018-10-18 DIAGNOSIS — J30.2 SEASONAL ALLERGIES: ICD-10-CM

## 2018-10-18 DIAGNOSIS — Z76.89 ENCOUNTER TO ESTABLISH CARE: Primary | ICD-10-CM

## 2018-10-18 DIAGNOSIS — F33.41 RECURRENT MAJOR DEPRESSIVE DISORDER, IN PARTIAL REMISSION (HCC): ICD-10-CM

## 2018-10-18 DIAGNOSIS — K21.00 GERD WITH ESOPHAGITIS: ICD-10-CM

## 2018-10-18 DIAGNOSIS — R06.09 DYSPNEA ON EXERTION: ICD-10-CM

## 2018-10-18 DIAGNOSIS — E78.2 MIXED HYPERLIPIDEMIA: ICD-10-CM

## 2018-10-18 DIAGNOSIS — Z72.0 TOBACCO ABUSE: ICD-10-CM

## 2018-10-18 DIAGNOSIS — Z86.79 HISTORY OF ANGINA PECTORIS: ICD-10-CM

## 2018-10-18 LAB
ALBUMIN SERPL-MCNC: 4.6 G/DL (ref 3.5–5)
ALBUMIN/GLOB SERPL: 1.9 G/DL (ref 1.5–2.5)
ALP SERPL-CCNC: 77 U/L (ref 40–129)
ALT SERPL-CCNC: 23 U/L (ref 10–44)
AST SERPL-CCNC: 16 U/L (ref 10–34)
BASOPHILS # BLD AUTO: 0.08 10*3/MM3 (ref 0–0.3)
BASOPHILS NFR BLD AUTO: 0.6 % (ref 0–2)
BILIRUB SERPL-MCNC: 0.4 MG/DL (ref 0.2–1.8)
BUN SERPL-MCNC: 14 MG/DL (ref 7–21)
BUN/CREAT SERPL: 19.4 (ref 7–25)
CALCIUM SERPL-MCNC: 9.5 MG/DL (ref 7.7–10)
CHLORIDE SERPL-SCNC: 108 MMOL/L (ref 99–112)
CHOLEST SERPL-MCNC: 130 MG/DL (ref 0–200)
CO2 SERPL-SCNC: 22.4 MMOL/L (ref 24.3–31.9)
CREAT SERPL-MCNC: 0.72 MG/DL (ref 0.43–1.29)
EOSINOPHIL # BLD AUTO: 0.48 10*3/MM3 (ref 0–0.7)
EOSINOPHIL NFR BLD AUTO: 3.8 % (ref 0–5)
ERYTHROCYTE [DISTWIDTH] IN BLOOD BY AUTOMATED COUNT: 13.7 % (ref 11.5–14.5)
GLOBULIN SER CALC-MCNC: 2.4 GM/DL
GLUCOSE SERPL-MCNC: 100 MG/DL (ref 70–110)
HCT VFR BLD AUTO: 47.3 % (ref 42–52)
HDLC SERPL-MCNC: 37 MG/DL (ref 60–100)
HGB BLD-MCNC: 16 G/DL (ref 14–18)
IMM GRANULOCYTES # BLD: 0.02 10*3/MM3 (ref 0–0.03)
IMM GRANULOCYTES NFR BLD: 0.2 % (ref 0–0.5)
INTERPRETATION: NORMAL
LDLC SERPL CALC-MCNC: 81 MG/DL (ref 0–100)
LYMPHOCYTES # BLD AUTO: 4.03 10*3/MM3 (ref 1–3)
LYMPHOCYTES NFR BLD AUTO: 31.8 % (ref 21–51)
MCH RBC QN AUTO: 29.6 PG (ref 27–33)
MCHC RBC AUTO-ENTMCNC: 33.8 G/DL (ref 33–37)
MCV RBC AUTO: 87.6 FL (ref 80–94)
MONOCYTES # BLD AUTO: 0.99 10*3/MM3 (ref 0.1–0.9)
MONOCYTES NFR BLD AUTO: 7.8 % (ref 0–10)
NEUTROPHILS # BLD AUTO: 7.08 10*3/MM3 (ref 1.4–6.5)
NEUTROPHILS NFR BLD AUTO: 55.8 % (ref 30–70)
PLATELET # BLD AUTO: 255 10*3/MM3 (ref 130–400)
POTASSIUM SERPL-SCNC: 4.6 MMOL/L (ref 3.5–5.3)
PROT SERPL-MCNC: 7 G/DL (ref 6–8)
RBC # BLD AUTO: 5.4 10*6/MM3 (ref 4.7–6.1)
SODIUM SERPL-SCNC: 136 MMOL/L (ref 135–153)
TRIGL SERPL-MCNC: 62 MG/DL (ref 0–150)
VLDLC SERPL CALC-MCNC: 12.4 MG/DL
WBC # BLD AUTO: 12.68 10*3/MM3 (ref 4.5–12.5)

## 2018-10-18 PROCEDURE — 99204 OFFICE O/P NEW MOD 45 MIN: CPT | Performed by: FAMILY MEDICINE

## 2018-10-18 RX ORDER — ISOSORBIDE MONONITRATE 20 MG/1
20 TABLET ORAL 2 TIMES DAILY
COMMUNITY
End: 2019-02-25 | Stop reason: SDUPTHER

## 2018-10-18 RX ORDER — LISINOPRIL 20 MG/1
20 TABLET ORAL DAILY
COMMUNITY
End: 2019-04-09 | Stop reason: SDUPTHER

## 2018-10-18 RX ORDER — NITROGLYCERIN 0.4 MG/1
0.4 TABLET SUBLINGUAL
COMMUNITY
End: 2019-04-09 | Stop reason: SDUPTHER

## 2018-10-18 NOTE — PROGRESS NOTES
Subjective   Dipak Marinelli is a 53 y.o. male.   Pt presents today with CC of [facility]      History of Present Illness   #1 patient is a chronically ill 53-year-old male here to establish care.  #2 he reports a long smoking history, he is a current smoker.  He has nicotine patches at home from prior attempts to quit, he is interested in quitting.    #3 patient has a history of COPD.  He reports that he had a pulmonary function test 1 or 2 years ago, he is agreeable to sign for records release on that.  As of now he takes albuterol inhaler as needed for shortness of breath, he is not on any other inhalers.  He had a chest x-ray about one year ago for wheezing and shortness of breath.  It showed an opacity near his left lung base that was treated at that time for pneumonia, he reports that his breathing never improved completely from baseline, he is concerned about malignancy.  He denies fevers chills, night sweats, or weight loss.  He would like further evaluation with a CT scan of his chest as recommended in the past, reportedly.  #4 patient has hypertension.  He takes amlodipine, lisinopril, metoprolol succinate.  He tolerates these medicines well, they control his blood pressure well when he checks it at home.  He is agreeable to labs today.  #5 patient is a past medical history of coronary artery disease with stent.  He was lost to follow-up with his cardiologist.  He takes Plavix, 81 mg aspirin, Ismo, lisinopril, metoprolol succinate, he takes nitroglycerin as needed, and Crestor.  He tolerates these medicines, and denies angina since his stents were placed.  He denies side effects to these medicines.  He does not need refills at this time.  #6 he has GERD with esophagitis, he had a recent EGD at the [facility], it is controlled with AcipHex.  He tolerates this medicine well, does not need a refill.  #7 he has seasonal allergies, he takes Claritin for this.  #8 the patient has depression.  It is  partially controlled with Prozac 10 mg, he states that it is helping with his mood swings, he denies side effects.  He does not need a refill.  He refuses referral for a counselor.  #9 he has a history of a left TKA.  He also has arthritis in his right knee, originally he had planned to have his right knee done as well.  He had his orthopedic surgery done in [place].           The following portions of the patient's history were reviewed and updated as appropriate: allergies, current medications, past family history, past social history, past surgical history and problem list.    Review of Systems   Constitutional: Negative for chills, fever and unexpected weight loss.   HENT: Negative for congestion and sore throat.    Eyes: Negative for blurred vision and visual disturbance.   Respiratory: Positive for cough, shortness of breath and wheezing.    Cardiovascular: Negative for chest pain, palpitations and leg swelling.   Gastrointestinal: Positive for nausea and GERD. Negative for abdominal pain and diarrhea.   Endocrine: Negative for cold intolerance and heat intolerance.   Genitourinary: Negative for dysuria.   Musculoskeletal: Positive for arthralgias and back pain. Negative for neck stiffness.   Skin: Negative for rash.   Neurological: Negative for dizziness, seizures and syncope.   Psychiatric/Behavioral: Positive for depressed mood. Negative for self-injury and suicidal ideas.       Objective   Physical Exam   Constitutional: He is oriented to person, place, and time. He appears well-developed and well-nourished.   HENT:   Head: Normocephalic and atraumatic.   Right Ear: External ear normal.   Left Ear: External ear normal.   Nose: Nose normal.   Mouth/Throat: Oropharynx is clear and moist.   Eyes: Pupils are equal, round, and reactive to light. Conjunctivae and EOM are normal.   Neck: Normal range of motion. Neck supple. No JVD present. No thyromegaly present.   Cardiovascular: Normal rate, regular rhythm and  normal heart sounds.    Pulmonary/Chest: Effort normal.   Scattered wheezes throughout lung fields, no focal findings.  Consistent with emphysema.   Abdominal: Soft. Bowel sounds are normal. He exhibits no distension and no mass.   Old surgical scars   Musculoskeletal:   Left knee appears normal, old surgical incision well-healed, he has a full range of motion, mild swelling of the soft tissue below the knee, patient reports moderate pain at the knee with inversion and eversion of the ankle.  Joint seems to be intact.   Lymphadenopathy:     He has no cervical adenopathy.   Neurological: He is alert and oriented to person, place, and time.   Skin: Skin is warm and dry.   Psychiatric: He has a normal mood and affect. His behavior is normal. Judgment and thought content normal.   He is not suicidal.         Assessment/Plan   Dipak was seen today for establish care.    Diagnoses and all orders for this visit:    Encounter to establish care  -     CBC & Differential  -     Comprehensive Metabolic Panel  -     Lipid Panel  Patient has several health issues with poor trust in the healthcare system, I requested that he follow up regularly to ensure compliance and to ensure that his care as thorough.  History of angina pectoris  He is asymptomatic this time, cardiac exam is normal, if this changes I will refer him to cardiology.  Coronary artery disease involving native coronary artery of native heart without angina pectoris  -     CBC & Differential  -     Comprehensive Metabolic Panel  -     Lipid Panel    Mixed hyperlipidemia  -     CBC & Differential  -     Comprehensive Metabolic Panel  -     Lipid Panel    GERD with esophagitis  Recent EGD results reviewed  Recurrent major depressive disorder, in partial remission (CMS/HCC)  Patient is taking Prozac 10 mg daily, we discussed going up on this medicine since he is still somewhat depressed, he did not want to do this, he also refuses referral to a counselor.  Dyspnea on  exertion  -     CT Chest Without Contrast  -     CBC & Differential  -     Comprehensive Metabolic Panel  -     Lipid Panel  I reviewed the image from last year's x-ray, some fluffy infiltrate noted in the left lower lung field, overall subtle.  Given his cancer risk and smoking history we'll get a CT of the chest.  I expect to find an emphysematous lung.  Total knee replacement status, left  Had this done in [place], he complains that it is worse than it was prior, at follow-up we will discuss it further.  We'll consider referral to orthopedics.  Seasonal allergies    Tobacco abuse  -     CT Chest Without Contrast  We discussed smoking cessation, he is ready to quit though is worried that he can have difficulty with it.  Recommendations made as follows :Smoke like you normally do for one week, but write down the time of each cigarette in your diary. Starting with the second week, replace your least important cigarette each day with a ½ straw. Continue until you’ve stopped.  OR he can try picking a quit day, going from there.  Patient is a poor candidate for Chantix in my opinion.               I advised Dipak of the risks of continuing to use tobacco, and I provided him with tobacco cessation educational materials in the After Visit Summary.     During this visit, I spent 3 minutes counseling the patient regarding tobacco cessation.    Patient's Body mass index is 39.8 kg/m². BMI is above normal parameters. Recommendations include: exercise counseling and nutrition counseling.

## 2018-10-19 ENCOUNTER — TELEPHONE (OUTPATIENT)
Dept: FAMILY MEDICINE CLINIC | Facility: CLINIC | Age: 53
End: 2018-10-19

## 2018-10-19 NOTE — TELEPHONE ENCOUNTER
----- Message from Pilo Lock DO sent at 10/19/2018  8:58 AM EDT -----  I reviewed your lab work.  Your white blood cell count was slightly elevated.  We will keep an eye on this, and I will review year-old records to see if it is always that way.  Otherwise I have no concerns.  Please go for your CT scan.      Attempted to contact patient,unable to contact at this time will attempt later.      Patient returned call & verbalized understanding.

## 2018-11-01 ENCOUNTER — OFFICE VISIT (OUTPATIENT)
Dept: FAMILY MEDICINE CLINIC | Facility: CLINIC | Age: 53
End: 2018-11-01

## 2018-11-01 VITALS
HEART RATE: 102 BPM | TEMPERATURE: 98.8 F | WEIGHT: 273.2 LBS | OXYGEN SATURATION: 95 % | SYSTOLIC BLOOD PRESSURE: 116 MMHG | DIASTOLIC BLOOD PRESSURE: 76 MMHG | BODY MASS INDEX: 39.11 KG/M2 | HEIGHT: 70 IN

## 2018-11-01 DIAGNOSIS — Z12.11 SCREENING FOR COLON CANCER: ICD-10-CM

## 2018-11-01 DIAGNOSIS — Z72.0 TOBACCO ABUSE: Primary | ICD-10-CM

## 2018-11-01 DIAGNOSIS — Z23 NEED FOR VACCINATION: ICD-10-CM

## 2018-11-01 DIAGNOSIS — F33.41 RECURRENT MAJOR DEPRESSIVE DISORDER, IN PARTIAL REMISSION (HCC): ICD-10-CM

## 2018-11-01 DIAGNOSIS — R06.09 DYSPNEA ON EXERTION: ICD-10-CM

## 2018-11-01 PROCEDURE — 90471 IMMUNIZATION ADMIN: CPT | Performed by: FAMILY MEDICINE

## 2018-11-01 PROCEDURE — 99214 OFFICE O/P EST MOD 30 MIN: CPT | Performed by: FAMILY MEDICINE

## 2018-11-01 PROCEDURE — 90632 HEPA VACCINE ADULT IM: CPT | Performed by: FAMILY MEDICINE

## 2018-11-01 RX ORDER — FLUOXETINE HYDROCHLORIDE 20 MG/1
20 CAPSULE ORAL DAILY
Qty: 30 CAPSULE | Refills: 2 | Status: SHIPPED | OUTPATIENT
Start: 2018-11-01 | End: 2019-02-25 | Stop reason: SDUPTHER

## 2018-11-01 NOTE — PROGRESS NOTES
Subjective   Dipak Marinelli is a 53 y.o. male.   Pt presents today with CC of Follow-up      History of Present Illness   #1 patient is here for follow-up on depression.  He takes Prozac 10 mg daily.  He tolerates this medicine well, he has been on this dose for about 4 months reportedly.  He has no concerns with this medicine.  He states that it has helped, though he is not where he would like to be.  He denies suicidal or homicidal ideation.  #2 patient was sent for a CT scan of his chest without contrast previously to workup shortness of breath and an abnormal chest x-ray prior, he states that he was never called for his appointment.  If indicated, he would still like to go for this CT scan of chest.  His shortness of breath is unchanged.  He denies fevers, chills, worsening shortness of breath, weight loss, or night sweats.  He is still smoking.  #3 patient is a current every day smoker.  He knows that he should stop, though he needs help quitting.  He is out of nicotine patches, he used them in the past and they worked well.    #4 he requests the hepatitis A vaccine as he has had contacts that have contracted hepatitis A.  He has no other risk factors for hepatitis A, and denies history of hepatitis.  #5 his last colonoscopy was reported as being 15 years ago.  He would like a colonoscopy, he is established with a local surgeon.  He denies symptoms, his bowel habits have been normal.         The following portions of the patient's history were reviewed and updated as appropriate: allergies, current medications, past family history, past social history, past surgical history and problem list.    Review of Systems   Constitutional: Negative for chills, fever and unexpected weight loss.   HENT: Negative for congestion and sore throat.    Eyes: Negative for blurred vision and visual disturbance.   Respiratory: Positive for cough, shortness of breath and wheezing.    Cardiovascular: Negative for chest pain,  palpitations and leg swelling.   Gastrointestinal: Positive for nausea and GERD. Negative for abdominal pain and diarrhea.   Endocrine: Negative for cold intolerance and heat intolerance.   Genitourinary: Negative for dysuria.   Musculoskeletal: Positive for arthralgias and back pain. Negative for neck stiffness.   Skin: Negative for rash.   Neurological: Negative for dizziness, seizures and syncope.   Psychiatric/Behavioral: Positive for depressed mood. Negative for self-injury and suicidal ideas.       Objective   Physical Exam   Constitutional: He is oriented to person, place, and time. He appears well-developed and well-nourished.   HENT:   Head: Normocephalic and atraumatic.   Eyes: Conjunctivae are normal. No scleral icterus.   Neck: Normal range of motion. Neck supple. No JVD present. No thyromegaly present.   Cardiovascular: Normal rate, regular rhythm and normal heart sounds.    Pulmonary/Chest: Effort normal.   Scattered wheezes throughout lung fields, no focal findings.  Consistent with emphysema.   Abdominal: Soft. Bowel sounds are normal. He exhibits no distension and no mass.   Old surgical scars   Musculoskeletal:   Patient is tender to palpation of the paraspinal musculature in the low lumbar spine.  Poor hamstring flexibility.   Lymphadenopathy:     He has no cervical adenopathy.   Neurological: He is alert and oriented to person, place, and time. He displays normal reflexes. No cranial nerve deficit or sensory deficit. He exhibits normal muscle tone. Coordination normal.   Straight leg raise and slump test negative   Psychiatric: He has a normal mood and affect. His behavior is normal. Judgment and thought content normal.   He is not suicidal.         Assessment/Plan   Dipak was seen today for follow-up.    Diagnoses and all orders for this visit:    Tobacco abuse  -     nicotine (NICODERM CQ) 7 MG/24HR patch; Place 1 patch on the skin as directed by provider Daily.  He was asked not to smoke  while using nicotine patches, he was agreeable.  I reviewed the side effects.  He tolerated the patches well in the past  Dyspnea on exertion  -     CT Chest Without Contrast  Will try getting a CT scan again, he reports that his problem prior was that they did not call him will order it again, once more.  He is not losing a significant amount of weight, and his condition is not worsening so I feel as though his shortness of breath on exertion is due to his heart and his lungs, less likely malignancy.  Recurrent major depressive disorder, in partial remission (CMS/HCC)  -     FLUoxetine (PROzac) 20 MG capsule; Take 1 capsule by mouth Daily.  I increased his Prozac from 10->20 mg.  The side effects of this medicine were reviewed, he has had no problem with it in the past, I feel he will tolerate the increased dose well.  He is currently not having any suicidal or homicidal ideation.  Need for vaccination  -     hepatitis A (HAVRIX) vaccine 1,440 Units; Inject 1 mL into the appropriate muscle as directed by prescriber 1 (One) Time.  Risks and benefits to this medicine were discussed, he was agreeable to proceed  Screening for colon cancer  -     Ambulatory Referral to General Surgery  Will refer to surgeon for consideration of getting a colonoscopy.  He is asymptomatic               I advised Dipak of the risks of continuing to use tobacco, and I provided him with tobacco cessation educational materials in the After Visit Summary.     During this visit, I spent 5 minutes counseling the patient regarding tobacco cessation.    Patient's Body mass index is 39.2 kg/m². BMI is above normal parameters. Recommendations include: nutrition counseling.

## 2018-11-07 ENCOUNTER — PREP FOR SURGERY (OUTPATIENT)
Dept: OTHER | Facility: HOSPITAL | Age: 53
End: 2018-11-07

## 2018-11-07 DIAGNOSIS — Z12.11 ENCOUNTER FOR SCREENING COLONOSCOPY: Primary | ICD-10-CM

## 2018-11-13 ENCOUNTER — TELEPHONE (OUTPATIENT)
Dept: SURGERY | Facility: CLINIC | Age: 53
End: 2018-11-13

## 2018-11-13 NOTE — TELEPHONE ENCOUNTER
Patient called and said he needs to cancel his Colonoscopy. He will call back at a later time to reschedule.

## 2019-02-25 DIAGNOSIS — F33.41 RECURRENT MAJOR DEPRESSIVE DISORDER, IN PARTIAL REMISSION (HCC): ICD-10-CM

## 2019-02-25 RX ORDER — HYDROGEN PEROXIDE 2.65 ML/100ML
81 LIQUID ORAL; TOPICAL DAILY
Qty: 30 TABLET | Refills: 0 | Status: SHIPPED | OUTPATIENT
Start: 2019-02-25 | End: 2019-03-26 | Stop reason: SDUPTHER

## 2019-02-25 RX ORDER — FLUOXETINE HYDROCHLORIDE 20 MG/1
20 CAPSULE ORAL DAILY
Qty: 30 CAPSULE | Refills: 0 | Status: SHIPPED | OUTPATIENT
Start: 2019-02-25 | End: 2019-03-26 | Stop reason: SDUPTHER

## 2019-02-25 RX ORDER — ISOSORBIDE MONONITRATE 20 MG/1
20 TABLET ORAL 2 TIMES DAILY
Qty: 60 TABLET | Refills: 0 | Status: SHIPPED | OUTPATIENT
Start: 2019-02-25 | End: 2019-04-09 | Stop reason: ALTCHOICE

## 2019-02-25 NOTE — TELEPHONE ENCOUNTER
Patient called for the pended med refills reports he is in the process of moving & has no where to go,will call for a follow up as soon as he gets where he can come in.

## 2019-03-26 ENCOUNTER — TELEPHONE (OUTPATIENT)
Dept: FAMILY MEDICINE CLINIC | Facility: CLINIC | Age: 54
End: 2019-03-26

## 2019-03-26 DIAGNOSIS — F33.41 RECURRENT MAJOR DEPRESSIVE DISORDER, IN PARTIAL REMISSION (HCC): ICD-10-CM

## 2019-03-26 RX ORDER — HYDROGEN PEROXIDE 2.65 ML/100ML
81 LIQUID ORAL; TOPICAL DAILY
Qty: 30 TABLET | Refills: 0 | Status: SHIPPED | OUTPATIENT
Start: 2019-03-26 | End: 2019-04-09 | Stop reason: SDUPTHER

## 2019-03-26 RX ORDER — FLUOXETINE HYDROCHLORIDE 20 MG/1
20 CAPSULE ORAL DAILY
Qty: 30 CAPSULE | Refills: 0 | Status: SHIPPED | OUTPATIENT
Start: 2019-03-26 | End: 2019-04-09 | Stop reason: SDUPTHER

## 2019-03-27 ENCOUNTER — TELEPHONE (OUTPATIENT)
Dept: SURGERY | Facility: CLINIC | Age: 54
End: 2019-03-27

## 2019-03-27 NOTE — TELEPHONE ENCOUNTER
PATIENT CALLED AND STATED HE NEVER HEARD BACK FROM THE BARIATRIC DOCTOR WE REFERRED HIM TO. I LOOKED AT THE REFERRAL AND IT SHOWS HE SENT IN THE NEW PATIENT PACKET. BUT THEY TRIED CONTACTING HIM MULTIPLE TIMES AND THERE WAS NO ANSWER OR VOICEMAIL. I PROVIDED THE NUMBER TO DR. MCCORMACK SO HE CAN GET SCHEDULED.

## 2019-04-09 ENCOUNTER — OFFICE VISIT (OUTPATIENT)
Dept: FAMILY MEDICINE CLINIC | Facility: CLINIC | Age: 54
End: 2019-04-09

## 2019-04-09 VITALS
OXYGEN SATURATION: 98 % | DIASTOLIC BLOOD PRESSURE: 82 MMHG | SYSTOLIC BLOOD PRESSURE: 120 MMHG | BODY MASS INDEX: 42.75 KG/M2 | HEART RATE: 82 BPM | TEMPERATURE: 97.9 F | WEIGHT: 298.6 LBS | HEIGHT: 70 IN

## 2019-04-09 DIAGNOSIS — J41.0 SIMPLE CHRONIC BRONCHITIS (HCC): ICD-10-CM

## 2019-04-09 DIAGNOSIS — I25.10 CORONARY ARTERY DISEASE INVOLVING NATIVE CORONARY ARTERY OF NATIVE HEART WITHOUT ANGINA PECTORIS: Primary | ICD-10-CM

## 2019-04-09 DIAGNOSIS — E78.2 MIXED HYPERLIPIDEMIA: ICD-10-CM

## 2019-04-09 DIAGNOSIS — Z86.79 HISTORY OF ANGINA PECTORIS: ICD-10-CM

## 2019-04-09 DIAGNOSIS — K21.00 GERD WITH ESOPHAGITIS: ICD-10-CM

## 2019-04-09 DIAGNOSIS — Z72.0 TOBACCO ABUSE: ICD-10-CM

## 2019-04-09 DIAGNOSIS — F33.41 RECURRENT MAJOR DEPRESSIVE DISORDER, IN PARTIAL REMISSION (HCC): ICD-10-CM

## 2019-04-09 DIAGNOSIS — I10 ESSENTIAL HYPERTENSION: ICD-10-CM

## 2019-04-09 PROBLEM — R12 HEARTBURN: Status: RESOLVED | Noted: 2018-08-29 | Resolved: 2019-04-09

## 2019-04-09 PROBLEM — Z12.11 ENCOUNTER FOR SCREENING COLONOSCOPY: Status: RESOLVED | Noted: 2018-11-07 | Resolved: 2019-04-09

## 2019-04-09 PROBLEM — Z23 NEED FOR VACCINATION: Status: RESOLVED | Noted: 2018-11-01 | Resolved: 2019-04-09

## 2019-04-09 PROBLEM — Z76.89 ENCOUNTER TO ESTABLISH CARE: Status: RESOLVED | Noted: 2018-10-18 | Resolved: 2019-04-09

## 2019-04-09 PROBLEM — Z12.11 SCREENING FOR COLON CANCER: Status: RESOLVED | Noted: 2018-11-01 | Resolved: 2019-04-09

## 2019-04-09 PROCEDURE — 99214 OFFICE O/P EST MOD 30 MIN: CPT | Performed by: FAMILY MEDICINE

## 2019-04-09 RX ORDER — CLOPIDOGREL BISULFATE 75 MG/1
75 TABLET ORAL DAILY
Qty: 90 TABLET | Refills: 0 | Status: SHIPPED | OUTPATIENT
Start: 2019-04-09 | End: 2019-07-01 | Stop reason: SDUPTHER

## 2019-04-09 RX ORDER — ISOSORBIDE MONONITRATE 30 MG/1
30 TABLET, EXTENDED RELEASE ORAL DAILY
Qty: 90 TABLET | Refills: 0 | Status: SHIPPED | OUTPATIENT
Start: 2019-04-09 | End: 2019-07-01 | Stop reason: SDUPTHER

## 2019-04-09 RX ORDER — RABEPRAZOLE SODIUM 20 MG/1
20 TABLET, DELAYED RELEASE ORAL DAILY
Qty: 90 TABLET | Refills: 0 | Status: SHIPPED | OUTPATIENT
Start: 2019-04-09 | End: 2019-07-01 | Stop reason: SDUPTHER

## 2019-04-09 RX ORDER — ROSUVASTATIN CALCIUM 10 MG/1
10 TABLET, COATED ORAL DAILY
Qty: 90 TABLET | Refills: 0 | Status: SHIPPED | OUTPATIENT
Start: 2019-04-09 | End: 2019-07-01 | Stop reason: SDUPTHER

## 2019-04-09 RX ORDER — METOPROLOL SUCCINATE 25 MG/1
25 TABLET, EXTENDED RELEASE ORAL DAILY
Qty: 90 TABLET | Refills: 0 | Status: SHIPPED | OUTPATIENT
Start: 2019-04-09 | End: 2019-07-01 | Stop reason: SDUPTHER

## 2019-04-09 RX ORDER — HYDROGEN PEROXIDE 2.65 ML/100ML
81 LIQUID ORAL; TOPICAL DAILY
Qty: 90 TABLET | Refills: 1 | Status: SHIPPED | OUTPATIENT
Start: 2019-04-09 | End: 2019-05-09 | Stop reason: SDUPTHER

## 2019-04-09 RX ORDER — ALBUTEROL SULFATE 90 UG/1
1 AEROSOL, METERED RESPIRATORY (INHALATION) EVERY 4 HOURS PRN
Qty: 1 INHALER | Refills: 2 | Status: SHIPPED | OUTPATIENT
Start: 2019-04-09 | End: 2019-07-01

## 2019-04-09 RX ORDER — RANOLAZINE 500 MG/1
500 TABLET, EXTENDED RELEASE ORAL 2 TIMES DAILY
Qty: 180 TABLET | Refills: 0 | Status: SHIPPED | OUTPATIENT
Start: 2019-04-09 | End: 2019-07-01 | Stop reason: SDUPTHER

## 2019-04-09 RX ORDER — FLUOXETINE HYDROCHLORIDE 40 MG/1
40 CAPSULE ORAL DAILY
Qty: 90 CAPSULE | Refills: 0 | Status: SHIPPED | OUTPATIENT
Start: 2019-04-09 | End: 2019-07-01 | Stop reason: SDUPTHER

## 2019-04-09 RX ORDER — ALBUTEROL SULFATE 2.5 MG/3ML
2.5 SOLUTION RESPIRATORY (INHALATION) EVERY 4 HOURS PRN
Qty: 30 VIAL | Refills: 2 | Status: SHIPPED | OUTPATIENT
Start: 2019-04-09 | End: 2019-07-01 | Stop reason: SDUPTHER

## 2019-04-09 RX ORDER — AMLODIPINE BESYLATE 5 MG/1
5 TABLET ORAL DAILY
Qty: 90 TABLET | Refills: 0 | Status: SHIPPED | OUTPATIENT
Start: 2019-04-09 | End: 2019-07-01 | Stop reason: SDUPTHER

## 2019-04-09 RX ORDER — NITROGLYCERIN 0.4 MG/1
0.4 TABLET SUBLINGUAL
Qty: 30 TABLET | Refills: 1 | Status: SHIPPED | OUTPATIENT
Start: 2019-04-09 | End: 2021-03-02 | Stop reason: SDUPTHER

## 2019-04-09 RX ORDER — ISOSORBIDE MONONITRATE 20 MG/1
20 TABLET ORAL 2 TIMES DAILY
Qty: 180 TABLET | Refills: 0 | Status: CANCELLED | OUTPATIENT
Start: 2019-04-09

## 2019-04-09 RX ORDER — LISINOPRIL 20 MG/1
20 TABLET ORAL DAILY
Qty: 90 TABLET | Refills: 0 | Status: SHIPPED | OUTPATIENT
Start: 2019-04-09 | End: 2019-07-01 | Stop reason: SDUPTHER

## 2019-04-09 NOTE — PROGRESS NOTES
Subjective   Dipak Marinelli is a 53 y.o. male.   Pt presents today with CC of Follow-up and Weight Check      History of Present Illness   1.  Patient is here to follow-up for medication refills.  He missed his last appointment.  #2 he has a history of COPD associated with tobacco abuse.  He was not able to use the nicotine patches as they would not stay on all day.  He quickly would return to smoking.  He has no interest in quitting at this time.  A CT chest was ordered for him at his last visit, he never went and had this done.  He states that he is breathing well and has no concerns.  He needs refill on albuterol inhaler and albuterol nebulizer solution.  #3 he has coronary artery disease.  He takes Plavix, aspirin, Ranexa, Nitrostat, and Imdur.  He tolerates all these medicines.  Patient is taking the medicine as prescribed, denies side effects of medicine, is tolerating it well, would like refill.  #4 he has current major depressive disorder.  He is currently on Prozac 20 mg daily.  He reports that is helping minimally.  He requested an increase in dose.  He denies suicidal or homicidal ideation.  #5 he has mixed hyperlipidemia.  He takes Crestor 10 mg daily.  He tolerates this medicine well.  He is fasting for lipid panel today.  #6 patient has a BMI of 42.8.  He has been attempting to lose weight with diet for the past 6 months.  He would like to get into a formal weight loss program, he is even willing to consider gastric bypass.   #7 he has GERD with esophagitis.  He takes AcipHex with minimal benefit.  He reports a hiatal hernia.  He would like to continue this medicine as it works better than anything else he has tried.  #8 he has hypertension.  He takes Norvasc, Imdur, lisinopril, and Toprol.  He tolerates these medicines well.  He does not check his blood pressure regularly though reports that it is always good when he goes for doctor's appointments.  He would like a refill on all of these  medicines.                     The following portions of the patient's history were reviewed and updated as appropriate: allergies, current medications, past family history, past social history, past surgical history and problem list.    Review of Systems   Constitutional: Negative for chills, fever and unexpected weight loss.   HENT: Negative for congestion and sore throat.    Eyes: Negative for blurred vision and visual disturbance.   Respiratory: Negative for cough and wheezing.    Cardiovascular: Negative for chest pain and palpitations.   Gastrointestinal: Negative for abdominal pain and diarrhea.   Genitourinary: Negative for dysuria.   Musculoskeletal: Negative for arthralgias and neck stiffness.   Skin: Negative for rash.   Neurological: Negative for dizziness, seizures and syncope.   Psychiatric/Behavioral: Positive for depressed mood. Negative for self-injury and suicidal ideas.       Objective   Physical Exam   Constitutional: He is oriented to person, place, and time. He appears well-developed and well-nourished.   HENT:   Head: Normocephalic and atraumatic.   Eyes: Conjunctivae are normal.   Neck: Normal range of motion. Neck supple.   Cardiovascular: Normal rate, regular rhythm and normal heart sounds.   Pulmonary/Chest: Effort normal and breath sounds normal.   Neurological: He is alert and oriented to person, place, and time.   Skin: Skin is warm and dry.   Surgical scars noted in his low lumbar spine   Psychiatric: He has a normal mood and affect. His behavior is normal. Judgment and thought content normal.   Nursing note and vitals reviewed.        Assessment/Plan   Dipak was seen today for follow-up and weight check.    Diagnoses and all orders for this visit:    Coronary artery disease involving native coronary artery of native heart without angina pectoris  -     clopidogrel (PLAVIX) 75 MG tablet; Take 1 tablet by mouth Daily.  -     EQ ASPIRIN ADULT LOW DOSE 81 MG EC tablet; Take 1 tablet by  mouth Daily.  -     ranolazine (RANEXA) 500 MG 12 hr tablet; Take 1 tablet by mouth 2 (Two) Times a Day.  -     nitroglycerin (NITROSTAT) 0.4 MG SL tablet; Place 1 tablet under the tongue Every 5 (Five) Minutes As Needed for Chest Pain. Take no more than 3 doses in 15 minutes.  -     isosorbide mononitrate (IMDUR) 30 MG 24 hr tablet; Take 1 tablet by mouth Daily.  -     CBC & Differential  -     Comprehensive Metabolic Panel  -     Lipid Panel    Recurrent major depressive disorder, in partial remission (CMS/HCC)  -     FLUoxetine (PROzac) 40 MG capsule; Take 1 capsule by mouth Daily.    Mixed hyperlipidemia  -     rosuvastatin (CRESTOR) 10 MG tablet; Take 1 tablet by mouth Daily.  -     CBC & Differential  -     Comprehensive Metabolic Panel  -     Lipid Panel    BMI 40.0-44.9, adult (CMS/HCC)  He wants to enter a formal weight loss program here in the clinic. To lose weight, you must burn more calories than you take in. Hunger is the biggest obstacle to doing this for most people. Eat a low glycemic index diet. Try to get 5-6 small meals per day (approximately the amount of food you can fit into the palm of your hand.) You likely won’t feel stuffed when you eat, but this will take the edge off the hunger until it’s time to eat again. Over time, your stomach with get used to this and may even shrink a bit, allowing a smaller amount of food to satisfy you more. Replace sedentary activities such as watching tv on the couch with active ones, such as using an exercise bicycle, treadmill, etc in front of your favorite television program or video.  Some find joining a local gym beneficial.   He is to follow-up monthly for the next 6 months.  Each time we will monitor his weight, diet, and exercise regimen.  Goal weight loss is 1-2 pounds per week.  GERD with esophagitis  -     RABEprazole (ACIPHEX) 20 MG EC tablet; Take 1 tablet by mouth Daily.    History of angina pectoris  -     EQ ASPIRIN ADULT LOW DOSE 81 MG EC  tablet; Take 1 tablet by mouth Daily.  -     nitroglycerin (NITROSTAT) 0.4 MG SL tablet; Place 1 tablet under the tongue Every 5 (Five) Minutes As Needed for Chest Pain. Take no more than 3 doses in 15 minutes.  -     isosorbide mononitrate (IMDUR) 30 MG 24 hr tablet; Take 1 tablet by mouth Daily.    Tobacco abuse    Essential hypertension  -     amLODIPine (NORVASC) 5 MG tablet; Take 1 tablet by mouth Daily.  -     isosorbide mononitrate (IMDUR) 30 MG 24 hr tablet; Take 1 tablet by mouth Daily.  -     rosuvastatin (CRESTOR) 10 MG tablet; Take 1 tablet by mouth Daily.  -     lisinopril (PRINIVIL,ZESTRIL) 20 MG tablet; Take 1 tablet by mouth Daily.  -     metoprolol succinate XL (TOPROL-XL) 25 MG 24 hr tablet; Take 1 tablet by mouth Daily.  -     CBC & Differential  -     Comprehensive Metabolic Panel  -     Lipid Panel    Simple chronic bronchitis (CMS/HCC)  -     VENTOLIN  (90 Base) MCG/ACT inhaler; Inhale 1 puff Every 4 (Four) Hours As Needed for Wheezing.  -     albuterol (PROVENTIL) (2.5 MG/3ML) 0.083% nebulizer solution; Take 2.5 mg by nebulization Every 4 (Four) Hours As Needed for Wheezing.           I advised Dipak of the risks of continuing to use tobacco, and I provided him with tobacco cessation educational materials in the After Visit Summary.     During this visit, I spent 3 minutes counseling the patient regarding tobacco cessation.    Patient's Body mass index is 42.84 kg/m². BMI is above normal parameters. Recommendations include: exercise counseling and nutrition counseling.

## 2019-04-10 ENCOUNTER — TELEPHONE (OUTPATIENT)
Dept: FAMILY MEDICINE CLINIC | Facility: CLINIC | Age: 54
End: 2019-04-10

## 2019-04-10 LAB
ALBUMIN SERPL-MCNC: 4.5 G/DL (ref 3.5–5.2)
ALBUMIN/GLOB SERPL: 1.9 G/DL
ALP SERPL-CCNC: 66 U/L (ref 39–117)
ALT SERPL-CCNC: 21 U/L (ref 1–41)
AST SERPL-CCNC: 13 U/L (ref 1–40)
BASOPHILS # BLD AUTO: 0.06 10*3/MM3 (ref 0–0.2)
BASOPHILS NFR BLD AUTO: 0.6 % (ref 0–1.5)
BILIRUB SERPL-MCNC: 0.5 MG/DL (ref 0.2–1.2)
BUN SERPL-MCNC: 16 MG/DL (ref 6–20)
BUN/CREAT SERPL: 20 (ref 7–25)
CALCIUM SERPL-MCNC: 9.1 MG/DL (ref 8.6–10.5)
CHLORIDE SERPL-SCNC: 102 MMOL/L (ref 98–107)
CHOLEST SERPL-MCNC: 142 MG/DL (ref 0–200)
CO2 SERPL-SCNC: 23.9 MMOL/L (ref 22–29)
CREAT SERPL-MCNC: 0.8 MG/DL (ref 0.76–1.27)
EOSINOPHIL # BLD AUTO: 0.45 10*3/MM3 (ref 0–0.4)
EOSINOPHIL NFR BLD AUTO: 4.3 % (ref 0.3–6.2)
ERYTHROCYTE [DISTWIDTH] IN BLOOD BY AUTOMATED COUNT: 13.4 % (ref 12.3–15.4)
GLOBULIN SER CALC-MCNC: 2.4 GM/DL
GLUCOSE SERPL-MCNC: 120 MG/DL (ref 65–99)
HCT VFR BLD AUTO: 44.9 % (ref 37.5–51)
HDLC SERPL-MCNC: 40 MG/DL (ref 40–60)
HGB BLD-MCNC: 14.7 G/DL (ref 13–17.7)
IMM GRANULOCYTES # BLD AUTO: 0.07 10*3/MM3 (ref 0–0.05)
IMM GRANULOCYTES NFR BLD AUTO: 0.7 % (ref 0–0.5)
INTERPRETATION: NORMAL
LDLC SERPL CALC-MCNC: 82 MG/DL (ref 0–100)
LYMPHOCYTES # BLD AUTO: 3.1 10*3/MM3 (ref 0.7–3.1)
LYMPHOCYTES NFR BLD AUTO: 29.4 % (ref 19.6–45.3)
MCH RBC QN AUTO: 29.9 PG (ref 26.6–33)
MCHC RBC AUTO-ENTMCNC: 32.7 G/DL (ref 31.5–35.7)
MCV RBC AUTO: 91.3 FL (ref 79–97)
MONOCYTES # BLD AUTO: 0.82 10*3/MM3 (ref 0.1–0.9)
MONOCYTES NFR BLD AUTO: 7.8 % (ref 5–12)
NEUTROPHILS # BLD AUTO: 6.06 10*3/MM3 (ref 1.4–7)
NEUTROPHILS NFR BLD AUTO: 57.2 % (ref 42.7–76)
NRBC BLD AUTO-RTO: 0 /100 WBC (ref 0–0)
PLATELET # BLD AUTO: 238 10*3/MM3 (ref 140–450)
POTASSIUM SERPL-SCNC: 5 MMOL/L (ref 3.5–5.2)
PROT SERPL-MCNC: 6.9 G/DL (ref 6–8.5)
RBC # BLD AUTO: 4.92 10*6/MM3 (ref 4.14–5.8)
SODIUM SERPL-SCNC: 139 MMOL/L (ref 136–145)
TRIGL SERPL-MCNC: 98 MG/DL (ref 0–150)
VLDLC SERPL CALC-MCNC: 19.6 MG/DL (ref 5–40)
WBC # BLD AUTO: 10.56 10*3/MM3 (ref 3.4–10.8)

## 2019-04-10 NOTE — TELEPHONE ENCOUNTER
----- Message from Pilo Lock DO sent at 4/10/2019  9:09 AM EDT -----  Please call him  Labs are stable.  Your fasting blood sugar was 120.  As you have a family history of diabetes I recommend dietary changes now to help prevent diabetes in the future.  At follow-up, we will consider getting a hemoglobin A1c which is a test that will help confirm.      Patient notified & verbalized understanding.

## 2019-05-09 ENCOUNTER — OFFICE VISIT (OUTPATIENT)
Dept: FAMILY MEDICINE CLINIC | Facility: CLINIC | Age: 54
End: 2019-05-09

## 2019-05-09 VITALS
HEIGHT: 70 IN | OXYGEN SATURATION: 96 % | WEIGHT: 300 LBS | BODY MASS INDEX: 42.95 KG/M2 | DIASTOLIC BLOOD PRESSURE: 84 MMHG | SYSTOLIC BLOOD PRESSURE: 126 MMHG | HEART RATE: 71 BPM | TEMPERATURE: 98.6 F

## 2019-05-09 DIAGNOSIS — Z86.79 HISTORY OF ANGINA PECTORIS: ICD-10-CM

## 2019-05-09 DIAGNOSIS — I25.10 CORONARY ARTERY DISEASE INVOLVING NATIVE CORONARY ARTERY OF NATIVE HEART WITHOUT ANGINA PECTORIS: ICD-10-CM

## 2019-05-09 DIAGNOSIS — Z71.3 WEIGHT LOSS COUNSELING, ENCOUNTER FOR: ICD-10-CM

## 2019-05-09 PROCEDURE — 99214 OFFICE O/P EST MOD 30 MIN: CPT | Performed by: FAMILY MEDICINE

## 2019-05-09 RX ORDER — HYDROGEN PEROXIDE 2.65 ML/100ML
81 LIQUID ORAL; TOPICAL DAILY
Qty: 90 TABLET | Refills: 1 | Status: SHIPPED | OUTPATIENT
Start: 2019-05-09 | End: 2019-07-01 | Stop reason: SDUPTHER

## 2019-05-09 NOTE — PROGRESS NOTES
Subjective   Dipak Marinelli is a 53 y.o. male.   Pt presents today with CC of Follow-up; Obesity; and Hyperglycemia      History of Present Illness   Patient is here to follow-up on weight loss.  He has sought out consultation with the [facility].  He has a family history of diabetes.  He does not currently carry the diagnosis of diabetes.  He was started on Prozac.  Doing well.  He has gained 2 pounds over the past month.  He reports eating 1 or 2 meals per day, reports a total of 1500 lucas/day.  Due to chronic pain in his knees and back, he has difficulty exercising adequately.  He states that he is going to start swimming.           The following portions of the patient's history were reviewed and updated as appropriate: allergies, current medications, past family history, past social history, past surgical history and problem list.    Review of Systems   Constitutional: Negative for chills, fever and unexpected weight loss.   HENT: Negative for congestion and sore throat.    Eyes: Negative for blurred vision and visual disturbance.   Respiratory: Negative for cough and wheezing.    Cardiovascular: Negative for chest pain and palpitations.   Gastrointestinal: Negative for abdominal pain and diarrhea.   Genitourinary: Negative for dysuria.   Musculoskeletal: Negative for arthralgias and neck stiffness.   Neurological: Negative for dizziness, seizures and syncope.   Psychiatric/Behavioral: Negative for self-injury, suicidal ideas and depressed mood.       Objective   Physical Exam   Constitutional: He is oriented to person, place, and time. He appears well-developed and well-nourished.   HENT:   Head: Normocephalic and atraumatic.   Eyes: Conjunctivae are normal.   Neck: Normal range of motion. Neck supple.   Cardiovascular: Normal rate, regular rhythm and normal heart sounds.   Pulmonary/Chest: Effort normal and breath sounds normal.   Abdominal: Soft.   Neurological: He is alert and  oriented to person, place, and time.   Skin: Skin is warm and dry. No rash noted.   Psychiatric: He has a normal mood and affect. His behavior is normal.         Assessment/Plan   Dipak was seen today for follow-up, obesity and hyperglycemia.    Diagnoses and all orders for this visit:    BMI 40.0-44.9, adult (CMS/[facility])  -     metFORMIN (GLUCOPHAGE) 500 MG tablet; Take 1 tablet by mouth 2 (Two) Times a Day With Meals.  In the interest of weight loss medications, we discussed various options.  Since he has a family history of diabetes and had a fasting blood glucose of 120 on his last blood work, we will start metformin 500 mg twice a day.  He is to take it only once a day for the first 3 to 4 days to see if he will tolerate it, side effects discussed.  He was agreeable to proceed.  Weight loss counseling, encounter for  -     metFORMIN (GLUCOPHAGE) 500 MG tablet; Take 1 tablet by mouth 2 (Two) Times a Day With Meals.  Follow-up in 1 month.  Goal weight loss 2 pounds per week.  Coronary artery disease involving native coronary artery of native heart without angina pectoris  -     EQ ASPIRIN ADULT LOW DOSE 81 MG EC tablet; Take 1 tablet by mouth Daily.  Needed a refill of enteric-coated aspirin.  History of angina pectoris  -     EQ ASPIRIN ADULT LOW DOSE 81 MG EC tablet; Take 1 tablet by mouth Daily.    Current everyday smoker         I advised Dipak of the risks of continuing to use tobacco, and I provided him with tobacco cessation educational materials in the After Visit Summary.     During this visit, I spent 3 minutes counseling the patient regarding tobacco cessation.    Patient's Body mass index is 43.05 kg/m². BMI is above normal parameters. Recommendations include: exercise counseling and nutrition counseling.

## 2019-07-01 ENCOUNTER — OFFICE VISIT (OUTPATIENT)
Dept: FAMILY MEDICINE CLINIC | Facility: CLINIC | Age: 54
End: 2019-07-01

## 2019-07-01 VITALS
HEART RATE: 83 BPM | OXYGEN SATURATION: 97 % | SYSTOLIC BLOOD PRESSURE: 138 MMHG | BODY MASS INDEX: 41.26 KG/M2 | RESPIRATION RATE: 20 BRPM | WEIGHT: 288.2 LBS | DIASTOLIC BLOOD PRESSURE: 86 MMHG | TEMPERATURE: 98.5 F | HEIGHT: 70 IN

## 2019-07-01 DIAGNOSIS — I25.10 CORONARY ARTERY DISEASE INVOLVING NATIVE CORONARY ARTERY OF NATIVE HEART WITHOUT ANGINA PECTORIS: ICD-10-CM

## 2019-07-01 DIAGNOSIS — F33.41 RECURRENT MAJOR DEPRESSIVE DISORDER, IN PARTIAL REMISSION (HCC): ICD-10-CM

## 2019-07-01 DIAGNOSIS — J41.0 SIMPLE CHRONIC BRONCHITIS (HCC): ICD-10-CM

## 2019-07-01 DIAGNOSIS — E78.2 MIXED HYPERLIPIDEMIA: ICD-10-CM

## 2019-07-01 DIAGNOSIS — Z71.3 WEIGHT LOSS COUNSELING, ENCOUNTER FOR: ICD-10-CM

## 2019-07-01 DIAGNOSIS — K21.00 GERD WITH ESOPHAGITIS: ICD-10-CM

## 2019-07-01 DIAGNOSIS — Z86.79 HISTORY OF ANGINA PECTORIS: ICD-10-CM

## 2019-07-01 DIAGNOSIS — I10 ESSENTIAL HYPERTENSION: ICD-10-CM

## 2019-07-01 PROCEDURE — 99214 OFFICE O/P EST MOD 30 MIN: CPT | Performed by: FAMILY MEDICINE

## 2019-07-01 RX ORDER — RANOLAZINE 500 MG/1
500 TABLET, EXTENDED RELEASE ORAL 2 TIMES DAILY
Qty: 180 TABLET | Refills: 0 | Status: SHIPPED | OUTPATIENT
Start: 2019-07-01 | End: 2020-01-14 | Stop reason: SDUPTHER

## 2019-07-01 RX ORDER — ROSUVASTATIN CALCIUM 10 MG/1
10 TABLET, COATED ORAL DAILY
Qty: 90 TABLET | Refills: 0 | Status: SHIPPED | OUTPATIENT
Start: 2019-07-01 | End: 2020-01-14 | Stop reason: SDUPTHER

## 2019-07-01 RX ORDER — HYDROGEN PEROXIDE 2.65 ML/100ML
81 LIQUID ORAL; TOPICAL DAILY
Qty: 90 TABLET | Refills: 1 | Status: SHIPPED | OUTPATIENT
Start: 2019-07-01 | End: 2020-01-14 | Stop reason: SDUPTHER

## 2019-07-01 RX ORDER — CLOPIDOGREL BISULFATE 75 MG/1
75 TABLET ORAL DAILY
Qty: 90 TABLET | Refills: 0 | Status: SHIPPED | OUTPATIENT
Start: 2019-07-01 | End: 2020-01-14 | Stop reason: SDUPTHER

## 2019-07-01 RX ORDER — ALBUTEROL SULFATE 2.5 MG/3ML
2.5 SOLUTION RESPIRATORY (INHALATION) EVERY 4 HOURS PRN
Qty: 30 VIAL | Refills: 2 | Status: SHIPPED | OUTPATIENT
Start: 2019-07-01 | End: 2020-01-14 | Stop reason: SDUPTHER

## 2019-07-01 RX ORDER — METOPROLOL SUCCINATE 25 MG/1
25 TABLET, EXTENDED RELEASE ORAL DAILY
Qty: 90 TABLET | Refills: 0 | Status: SHIPPED | OUTPATIENT
Start: 2019-07-01 | End: 2020-01-14 | Stop reason: SDUPTHER

## 2019-07-01 RX ORDER — ALBUTEROL SULFATE 90 UG/1
1 AEROSOL, METERED RESPIRATORY (INHALATION) EVERY 4 HOURS PRN
Qty: 1 INHALER | Refills: 2 | Status: SHIPPED | OUTPATIENT
Start: 2019-07-01 | End: 2020-01-14 | Stop reason: SDUPTHER

## 2019-07-01 RX ORDER — RABEPRAZOLE SODIUM 20 MG/1
20 TABLET, DELAYED RELEASE ORAL DAILY
Qty: 90 TABLET | Refills: 0 | Status: SHIPPED | OUTPATIENT
Start: 2019-07-01 | End: 2020-01-14 | Stop reason: SDUPTHER

## 2019-07-01 RX ORDER — AMLODIPINE BESYLATE 5 MG/1
5 TABLET ORAL DAILY
Qty: 90 TABLET | Refills: 0 | Status: SHIPPED | OUTPATIENT
Start: 2019-07-01 | End: 2020-01-14 | Stop reason: SDUPTHER

## 2019-07-01 RX ORDER — ISOSORBIDE MONONITRATE 30 MG/1
30 TABLET, EXTENDED RELEASE ORAL DAILY
Qty: 90 TABLET | Refills: 0 | Status: SHIPPED | OUTPATIENT
Start: 2019-07-01 | End: 2020-01-14 | Stop reason: SDUPTHER

## 2019-07-01 RX ORDER — LISINOPRIL 20 MG/1
20 TABLET ORAL DAILY
Qty: 90 TABLET | Refills: 0 | Status: SHIPPED | OUTPATIENT
Start: 2019-07-01 | End: 2020-01-14 | Stop reason: SDUPTHER

## 2019-07-01 RX ORDER — FLUOXETINE HYDROCHLORIDE 20 MG/1
60 CAPSULE ORAL DAILY
Qty: 270 CAPSULE | Refills: 1 | Status: SHIPPED | OUTPATIENT
Start: 2019-07-01 | End: 2020-01-14 | Stop reason: SDUPTHER

## 2019-07-01 NOTE — PROGRESS NOTES
Subjective   Dipak Marinelli is a 53 y.o. male.   Pt presents today with CC of Follow-up and Weight Check      History of Present Illness   1.  Patient is here to follow-up on weight loss.  He has sought out consultation with the [facility] and is awaiting appointment.  He has a family history of diabetes.  He is currently prediabetic, he takes metformin daily for this and weight loss.  Doing well.  He has been swimming and has lost 12 pounds since his last visit.  He reports eating 3 meals per day, reports a total of 1500 lucas/day.  Due to chronic pain in his knees and back, he has difficulty exercising adequately.  He has been swimming for exercise.  #2 patient currently is treated with Prozac 40 mg daily for depression.  He reports mild improvement, he requests an increase in dose.  He denies side effects of this medication.  He denies suicidal or homicidal ideation.  #3 he has simple chronic bronchitis associated with smoking.  He currently controls this with albuterol.  He uses both nebulizer and inhaler, not together.  He tolerates these meds well and would like a refill.  #4 he is a current everyday smoker.  He requests help quitting smoking.  In the past he used patches, they did not work because he sweats too much and they fall off.  He refuses to retry patches, he would like to try gum.  #5 he has essential hypertension controlled with 5 mg Norvasc, 30 mg Imdur, 20 mg lisinopril, 25 mg Toprol.  #6 he has a history of coronary artery disease.  He takes Ranexa and Plavix.  He also takes a low-dose aspirin as prescribed by cardiology.  #7 he has history of GERD with esophagitis.  He takes AcipHex 20 mg daily.  He has a history of a hiatal hernia, reportedly.  The plan to fix this when he has his gastric bypass, reportedly.  Improved diet has not seem to have helped a great deal.           The following portions of the patient's history were reviewed and updated as appropriate: allergies,  current medications, past family history, past social history, past surgical history and problem list.    Review of Systems   Constitutional: Negative for chills, fever and unexpected weight loss.   HENT: Negative for congestion and sore throat.    Eyes: Negative for blurred vision and visual disturbance.   Respiratory: Negative for cough and wheezing.    Cardiovascular: Negative for chest pain and palpitations.   Gastrointestinal: Negative for abdominal pain and diarrhea.   Endocrine: Negative for cold intolerance and heat intolerance.   Genitourinary: Negative for dysuria.   Musculoskeletal: Negative for arthralgias and neck stiffness.   Neurological: Negative for dizziness, seizures and syncope.   Psychiatric/Behavioral: Positive for depressed mood. Negative for agitation, behavioral problems, hallucinations, self-injury, suicidal ideas and stress.       Objective   Physical Exam   Constitutional: He is oriented to person, place, and time. He appears well-developed and well-nourished.   HENT:   Head: Normocephalic and atraumatic.   Eyes: Conjunctivae and EOM are normal. Pupils are equal, round, and reactive to light.   Neck: Normal range of motion. Neck supple.   Cardiovascular: Normal rate, regular rhythm and normal heart sounds.   Pulmonary/Chest: Effort normal and breath sounds normal.   Neurological: He is alert and oriented to person, place, and time.   Skin: Skin is warm and dry.   Psychiatric: He has a normal mood and affect. His behavior is normal. Judgment and thought content normal.   Nursing note and vitals reviewed.        Assessment/Plan   Dipak was seen today for follow-up and weight check.    Diagnoses and all orders for this visit:    Recurrent major depressive disorder, in partial remission (CMS/HCC)  -     FLUoxetine (PROzac) 20 MG capsule; Take 3 capsules by mouth Daily.  Will increase his fluoxetine from 40 mg daily to 60 mg daily.  The side effects of this medication were reviewed, he was  "agreeable to proceed.  If no significant effect at this dose will try other options.  I discussed with him the problem with other options is weight gain is associated with many \"stronger\" antidepressants.  Simple chronic bronchitis (CMS/[facility])  -     albuterol (PROVENTIL) (2.5 MG/3ML) 0.083% nebulizer solution; Take 2.5 mg by nebulization Every 4 (Four) Hours As Needed for Wheezing.  -     VENTOLIN  (90 Base) MCG/ACT inhaler; Inhale 1 puff Every 4 (Four) Hours As Needed for Wheezing.    Essential hypertension  -     amLODIPine (NORVASC) 5 MG tablet; Take 1 tablet by mouth Daily.  -     isosorbide mononitrate (IMDUR) 30 MG 24 hr tablet; Take 1 tablet by mouth Daily.  -     lisinopril (PRINIVIL,ZESTRIL) 20 MG tablet; Take 1 tablet by mouth Daily.  -     metoprolol succinate XL (TOPROL-XL) 25 MG 24 hr tablet; Take 1 tablet by mouth Daily.  -     rosuvastatin (CRESTOR) 10 MG tablet; Take 1 tablet by mouth Daily.    Coronary artery disease involving native coronary artery of native heart without angina pectoris  -     clopidogrel (PLAVIX) 75 MG tablet; Take 1 tablet by mouth Daily.  -     EQ ASPIRIN ADULT LOW DOSE 81 MG EC tablet; Take 1 tablet by mouth Daily.  -     isosorbide mononitrate (IMDUR) 30 MG 24 hr tablet; Take 1 tablet by mouth Daily.  -     ranolazine (RANEXA) 500 MG 12 hr tablet; Take 1 tablet by mouth 2 (Two) Times a Day.    History of angina pectoris  -     EQ ASPIRIN ADULT LOW DOSE 81 MG EC tablet; Take 1 tablet by mouth Daily.  -     isosorbide mononitrate (IMDUR) 30 MG 24 hr tablet; Take 1 tablet by mouth Daily.    BMI 40.0-44.9, adult (CMS/[facility])  -     metFORMIN (GLUCOPHAGE) 500 MG tablet; Take 1 tablet by mouth 2 (Two) Times a Day With Meals.    Weight loss counseling, encounter for  -     metFORMIN (GLUCOPHAGE) 500 MG tablet; Take 1 tablet by mouth 2 (Two) Times a Day With Meals.    GERD with esophagitis  -     RABEprazole (ACIPHEX) 20 MG EC tablet; Take 1 tablet by mouth Daily.    Mixed " hyperlipidemia  -     rosuvastatin (CRESTOR) 10 MG tablet; Take 1 tablet by mouth Daily.    Current every day smoker  -     nicotine polacrilex (NICORETTE) 4 MG gum; Chew 1 each As Needed for Smoking Cessation.                 I advised Dipak of the risks of continuing to use tobacco, and I provided him with tobacco cessation educational materials in the After Visit Summary.     During this visit, I spent 3 minutes counseling the patient regarding tobacco cessation.    Patient's Body mass index is 41.35 kg/m². BMI is above normal parameters. Recommendations include: educational material, exercise counseling, nutrition counseling and referral to a weight management program.

## 2019-07-29 ENCOUNTER — OFFICE VISIT (OUTPATIENT)
Dept: FAMILY MEDICINE CLINIC | Facility: CLINIC | Age: 54
End: 2019-07-29

## 2019-07-29 VITALS
HEART RATE: 63 BPM | OXYGEN SATURATION: 98 % | HEIGHT: 70 IN | TEMPERATURE: 99.3 F | SYSTOLIC BLOOD PRESSURE: 160 MMHG | WEIGHT: 286 LBS | DIASTOLIC BLOOD PRESSURE: 92 MMHG | BODY MASS INDEX: 40.94 KG/M2

## 2019-07-29 DIAGNOSIS — M54.42 ACUTE LEFT-SIDED LOW BACK PAIN WITH LEFT-SIDED SCIATICA: ICD-10-CM

## 2019-07-29 DIAGNOSIS — R53.81 DEBILITY: ICD-10-CM

## 2019-07-29 DIAGNOSIS — Z71.3 WEIGHT LOSS COUNSELING, ENCOUNTER FOR: Primary | ICD-10-CM

## 2019-07-29 DIAGNOSIS — Z09 HOSPITAL DISCHARGE FOLLOW-UP: ICD-10-CM

## 2019-07-29 PROCEDURE — 99214 OFFICE O/P EST MOD 30 MIN: CPT | Performed by: FAMILY MEDICINE

## 2019-07-29 RX ORDER — LORATADINE 10 MG/1
10 TABLET ORAL DAILY
Qty: 90 TABLET | Refills: 2 | Status: SHIPPED | OUTPATIENT
Start: 2019-07-29 | End: 2020-01-14 | Stop reason: SDUPTHER

## 2019-07-29 NOTE — PROGRESS NOTES
Subjective   Dipak Marinelli is a 53 y.o. male.   Pt presents today with [facility] hospital follow up      History of Present Illness   1.  Patient is a 53-year-old male here to follow-up on hospitalization.  He was hospitalized at the [facility].  He was admitted on 7/17/2019 and discharged on 7/19/2019.  He was initially in the emergency room in [place], he was sent to [facility] for neurosurgical coverage.  He was admitted for intractable back pain with left-sided sciatic symptoms.  Neurosurgery was consulted in the hospital, he was examined and it was recommended that he follow-up as an outpatient.  His MRI showed advanced multilevel discogenic degenerative change and post fusion changes.  Moderately severe multilevel central canal stenosis.  His low back pain has stabilized, he has no concerns today.  He intends to keep his follow-up with neurosurgery.  #2 he has chronic low back pain.  His apartment currently has him living upstairs, he would like a letter written so that he can move to the first floor.  His debility, obesity, and low back pain make it difficult to get up and down the steps.  #3 he has seasonal allergies.  He takes Claritin.  Patient is taking the medicine as prescribed, denies side effects of medicine, is tolerating it well, would like refill.  #4 he reports that as an inpatient they set up home PT.  He states they have not called him.  #5 he is here today for weight loss follow-up.  He presents with a form to be filled out to send to the weight loss clinic.  He is interested in bariatric surgery.  He has been following the diet, he tries his best to exercise daily.  He has lost 15 pounds since April.         The following portions of the patient's history were reviewed and updated as appropriate: allergies, current medications, past medical history, past social history, past surgical history and problem list.    Review of Systems   Constitutional: Negative for chills, fever and  unexpected weight loss.   HENT: Negative for congestion and sore throat.    Eyes: Negative for blurred vision and visual disturbance.   Respiratory: Negative for cough and wheezing.    Cardiovascular: Negative for chest pain and palpitations.   Gastrointestinal: Negative for abdominal pain and diarrhea.   Genitourinary: Negative for dysuria.   Musculoskeletal: Negative for arthralgias and neck stiffness.   Neurological: Negative for dizziness, seizures and syncope.   Psychiatric/Behavioral: Negative for self-injury, suicidal ideas and depressed mood.       Objective   Physical Exam   Constitutional: He is oriented to person, place, and time. He appears well-developed and well-nourished.   HENT:   Head: Normocephalic and atraumatic.   Right Ear: External ear normal.   Left Ear: External ear normal.   Nose: Nose normal.   Mouth/Throat: Oropharynx is clear and moist.   Eyes: Conjunctivae and EOM are normal. Pupils are equal, round, and reactive to light.   Neck: Normal range of motion. Neck supple.   Cardiovascular: Normal rate, regular rhythm and normal heart sounds.   Pulmonary/Chest: Effort normal and breath sounds normal.   Abdominal: Soft. Bowel sounds are normal.   Neurological: He is alert and oriented to person, place, and time. He displays normal reflexes. He exhibits normal muscle tone. Coordination normal.   5/5 strength bilaterally. DTRs symmetrical. Straight leg raise test negative bilaterally.     Skin: Skin is warm and dry.   Psychiatric: He has a normal mood and affect. His behavior is normal.   Nursing note and vitals reviewed.        Assessment/Plan   Dipak was seen today for hospital follow up.    Diagnoses and all orders for this visit:    Weight loss counseling, encounter for  Form filled out, to be faxed to his bariatric clinic of choice.  Hospital discharge follow-up  No need for labs at this time.  His back pain is well controlled.  Acute left-sided low back pain with left-sided sciatica  He is  to keep his appointment with neurosurgery.  Seasonal allergies.  -     loratadine (CLARITIN) 10 MG tablet; Take 1 tablet by mouth Daily.  The side effects of his medications were discussed, he was agreeable to proceed.  Debility    In my medical opinion, patient would benefit from a ground-level apartment.         Patient's Body mass index is 41.04 kg/m². BMI is above normal parameters. Recommendations include: exercise counseling and nutrition counseling.

## 2019-07-31 PROBLEM — R53.81 DEBILITY: Status: ACTIVE | Noted: 2019-07-31

## 2019-08-01 ENCOUNTER — TELEPHONE (OUTPATIENT)
Dept: FAMILY MEDICINE CLINIC | Facility: CLINIC | Age: 54
End: 2019-08-01

## 2019-08-13 ENCOUNTER — OFFICE VISIT (OUTPATIENT)
Dept: FAMILY MEDICINE CLINIC | Facility: CLINIC | Age: 54
End: 2019-08-13

## 2019-08-13 VITALS
DIASTOLIC BLOOD PRESSURE: 84 MMHG | HEIGHT: 70 IN | WEIGHT: 289.2 LBS | OXYGEN SATURATION: 97 % | BODY MASS INDEX: 41.4 KG/M2 | SYSTOLIC BLOOD PRESSURE: 140 MMHG | RESPIRATION RATE: 20 BRPM | HEART RATE: 95 BPM | TEMPERATURE: 99 F

## 2019-08-13 DIAGNOSIS — Z71.3 WEIGHT LOSS COUNSELING, ENCOUNTER FOR: Primary | ICD-10-CM

## 2019-08-13 DIAGNOSIS — M54.50 CHRONIC BILATERAL LOW BACK PAIN WITHOUT SCIATICA: ICD-10-CM

## 2019-08-13 DIAGNOSIS — M51.36 DEGENERATIVE DISC DISEASE, LUMBAR: ICD-10-CM

## 2019-08-13 DIAGNOSIS — G89.29 CHRONIC BILATERAL LOW BACK PAIN WITHOUT SCIATICA: ICD-10-CM

## 2019-08-13 PROCEDURE — 99214 OFFICE O/P EST MOD 30 MIN: CPT | Performed by: FAMILY MEDICINE

## 2019-08-13 NOTE — PROGRESS NOTES
Subjective   Dipak Marinelli is a 54 y.o. male.   Pt presents today with CC of Weight Check      History of Present Illness   1.  Patient is here to follow-up on weight loss.  He has been following the diet, he has not been exercising much due to back pain.  He has gained 3 pounds since his last visit.  He is tolerating metformin well without concern.  He is still looking to have bariatric surgery.  #2 patient complains of chronic low back pain.  He had back surgery years ago, has been well-healed from this though he has had worse stiffness lately.  He tries to stretch, he denies fevers or chills, night sweats, loss of bowel/bladder, weakness, or numbness.  The pain is located in his low lumbar spine, worse with twisting or bending.  Denies radiation.         The following portions of the patient's history were reviewed and updated as appropriate: allergies, current medications, past family history, past social history, past surgical history and problem list.    Review of Systems   Constitutional: Negative for chills, fever and unexpected weight loss.   HENT: Negative for congestion and sore throat.    Eyes: Negative for blurred vision and visual disturbance.   Respiratory: Negative for cough and wheezing.    Cardiovascular: Negative for chest pain and palpitations.   Gastrointestinal: Negative for abdominal pain, blood in stool, constipation and diarrhea.   Endocrine: Negative for cold intolerance and heat intolerance.   Genitourinary: Negative for dysuria.   Musculoskeletal: Positive for back pain. Negative for arthralgias, gait problem and neck stiffness.   Neurological: Negative for dizziness, seizures and syncope.   Psychiatric/Behavioral: Negative for self-injury, suicidal ideas and depressed mood.       Objective   Physical Exam   Constitutional: He is oriented to person, place, and time. He appears well-developed and well-nourished.   HENT:   Head: Normocephalic and atraumatic.   Nose: Nose normal.    Mouth/Throat: Oropharynx is clear and moist.   Eyes: Conjunctivae and EOM are normal. Pupils are equal, round, and reactive to light.   Neck: Normal range of motion. Neck supple.   Cardiovascular: Normal rate, regular rhythm and normal heart sounds.   Pulmonary/Chest: Effort normal and breath sounds normal.   Abdominal: Soft. Bowel sounds are normal.   Musculoskeletal:   knees, and ankles with full range of motion and 5/5 strength bilaterally.  Bilateral hips have limited flexion due to hamstring and low back tightness in the musculature.  DTRs symmetrical. Straight leg raise test negative bilaterally but limited by poor hamstring flexibility.   Neurological: He is alert and oriented to person, place, and time.   Skin: Skin is warm and dry.   Psychiatric: He has a normal mood and affect. His behavior is normal.   Nursing note and vitals reviewed.        Assessment/Plan   Dipak was seen today for weight check.    Diagnoses and all orders for this visit:    Weight loss counseling, encounter for  Exercise counselling provided.  We discussed stretches to the front and the back to the legs.  We had to modify them due to back pain.  We were able to do this successfully.  Degenerative disc disease, lumbar  He has chronic low back pain associated with this and lumbar surgery.  Recommended he follow-up for manipulation next week.  He was agreeable to plan.  He reports that he has been on narcotic pain pills in the past, and has no interest in and back on these.  Chronic bilateral low back pain without sciatica  As above               Patient's Body mass index is 41.5 kg/m². BMI is above normal parameters. Recommendations include: exercise counseling and nutrition counseling.

## 2019-08-20 ENCOUNTER — OFFICE VISIT (OUTPATIENT)
Dept: FAMILY MEDICINE CLINIC | Facility: CLINIC | Age: 54
End: 2019-08-20

## 2019-08-20 VITALS
WEIGHT: 286.9 LBS | OXYGEN SATURATION: 97 % | HEART RATE: 71 BPM | HEIGHT: 70 IN | SYSTOLIC BLOOD PRESSURE: 138 MMHG | DIASTOLIC BLOOD PRESSURE: 86 MMHG | BODY MASS INDEX: 41.07 KG/M2 | TEMPERATURE: 98.1 F

## 2019-08-20 DIAGNOSIS — M99.05 SEGMENTAL AND SOMATIC DYSFUNCTION OF PELVIC REGION: ICD-10-CM

## 2019-08-20 DIAGNOSIS — M99.03 SEGMENTAL AND SOMATIC DYSFUNCTION OF LUMBAR REGION: ICD-10-CM

## 2019-08-20 DIAGNOSIS — M99.06 SEGMENTAL AND SOMATIC DYSFUNCTION OF LOWER EXTREMITY: ICD-10-CM

## 2019-08-20 DIAGNOSIS — G89.29 CHRONIC MIDLINE LOW BACK PAIN WITHOUT SCIATICA: Primary | ICD-10-CM

## 2019-08-20 DIAGNOSIS — M99.02 SEGMENTAL AND SOMATIC DYSFUNCTION OF THORACIC REGION: ICD-10-CM

## 2019-08-20 DIAGNOSIS — M54.50 CHRONIC MIDLINE LOW BACK PAIN WITHOUT SCIATICA: Primary | ICD-10-CM

## 2019-08-20 PROCEDURE — 99214 OFFICE O/P EST MOD 30 MIN: CPT | Performed by: FAMILY MEDICINE

## 2019-08-20 PROCEDURE — 98926 OSTEOPATH MANJ 3-4 REGIONS: CPT | Performed by: FAMILY MEDICINE

## 2019-08-20 RX ORDER — AZITHROMYCIN 250 MG/1
TABLET, FILM COATED ORAL
COMMUNITY
Start: 2019-08-19 | End: 2019-09-03

## 2019-08-20 NOTE — PROGRESS NOTES
Subjective   Dipak Marinelli is a 54 y.o. male.   Pt presents today with CC of Back Pain      History of Present Illness   Patient is here to follow-up on back pain.  He is status post L4-L5, L5-S1 fusion with hardware.  MRI reviewed.  He was on narcotic pain medicines for years, he is not interested in going back on these.  He had a pain pump reportedly, this was located on his right flank.  He reports that his back pain is chronic, relatively unchanged.  It is associated with stiffness, it is worse with any movement through his L5-S1 joint.  He is going for weight loss surgery soon.  If indicated, he would like manipulation today.  He denies fevers, chills, loss of strength,  numbness, or loss of bowel/bladder.  He has since followed up with neurosurgery, it was told him that there was nothing more that could/should be done.          The following portions of the patient's history were reviewed and updated as appropriate: allergies, current medications, past family history, past social history, past surgical history and problem list.    Review of Systems   Constitutional: Negative for chills, fever and unexpected weight loss.   HENT: Negative for congestion and sore throat.    Eyes: Negative for blurred vision and visual disturbance.   Respiratory: Negative for cough and wheezing.    Cardiovascular: Negative for chest pain and palpitations.   Gastrointestinal: Negative for abdominal pain and diarrhea.   Endocrine: Negative for cold intolerance and heat intolerance.   Genitourinary: Negative for dysuria.   Musculoskeletal: Positive for back pain. Negative for arthralgias, gait problem and neck stiffness.   Neurological: Negative for dizziness, seizures and syncope.   Psychiatric/Behavioral: Negative for self-injury, suicidal ideas and depressed mood.       Objective   Physical Exam   Constitutional: He is oriented to person, place, and time. He appears well-developed and well-nourished.   HENT:   Head: Normocephalic  and atraumatic.   Eyes: Conjunctivae and EOM are normal. Pupils are equal, round, and reactive to light.   Neck: Normal range of motion. Neck supple.   Cardiovascular: Normal rate, regular rhythm and normal heart sounds.   Pulmonary/Chest: Effort normal and breath sounds normal.   Abdominal: Soft. Bowel sounds are normal.   Musculoskeletal:   Prominent surgical scars from level of about L3 down to the sacrum, then scars over his SI joints bilaterally, large well-healed scar noted on his right flank pain pump.  All scars are well-healed.    He has poor flexibility through his hamstrings and quads.  Tender to palpation over the midline of L4-S1, also over his right SI joint.  Osteopathic: T12 flexed, side bent and rotated to the left  L1 and L2 are flexed side bent right and rotated right  L4-S1 surgically fused.  Right hip anterior and out flare.  Right piriformis spasm.   Neurological: He is alert and oriented to person, place, and time.   Skin: Skin is warm and dry.   Psychiatric: He has a normal mood and affect. His behavior is normal.   Nursing note and vitals reviewed.        Assessment/Plan   Dipak was seen today for back pain.    Diagnoses and all orders for this visit:    Chronic midline low back pain without sciatica  MRI reviewed for the visit, and with the patient.  We discussed current and possible treatment options, he is agreeable to start a workout regimen, he is still working to  He does not want to go back to physical therapy at this time.  If indicated he wanted osteopathic manipulation.  Will consider referring him to back pain specialist if no improvement.  Current everyday smoker  Smoking was discussed for 2 minutes.  He was encouraged to quit smoking.  Smoking has a negative impact on back pain and degenerative disc disease.  At this time he does not want to quit smoking.  Segmental and somatic dysfunction of thoracic region  OMT procedure, expected risks and benefits discussed with patient,  who elects to proceed.  Written consent obtained.  4 techniques were used, no HVLA or direct techniques. Pt tolerated OMT well. No complications noted. Patient to do home stretches as shown in clinic today or instructed in after visit summary.    Segmental and somatic dysfunction of lumbar region  As above  Segmental and somatic dysfunction of pelvic region  As above  Segmental and somatic dysfunction of lower extremity  As above                 Patient's Body mass index is 41.17 kg/m². BMI is above normal parameters. Recommendations include: exercise counseling and nutrition counseling.

## 2019-09-03 ENCOUNTER — OFFICE VISIT (OUTPATIENT)
Dept: FAMILY MEDICINE CLINIC | Facility: CLINIC | Age: 54
End: 2019-09-03

## 2019-09-03 VITALS
DIASTOLIC BLOOD PRESSURE: 84 MMHG | SYSTOLIC BLOOD PRESSURE: 136 MMHG | TEMPERATURE: 98.6 F | HEIGHT: 70 IN | HEART RATE: 82 BPM | OXYGEN SATURATION: 95 % | WEIGHT: 285.8 LBS | BODY MASS INDEX: 40.92 KG/M2

## 2019-09-03 DIAGNOSIS — M99.03 SEGMENTAL AND SOMATIC DYSFUNCTION OF LUMBAR REGION: ICD-10-CM

## 2019-09-03 DIAGNOSIS — M99.05 SEGMENTAL AND SOMATIC DYSFUNCTION OF PELVIC REGION: ICD-10-CM

## 2019-09-03 DIAGNOSIS — M54.50 CHRONIC BILATERAL LOW BACK PAIN WITHOUT SCIATICA: Primary | ICD-10-CM

## 2019-09-03 DIAGNOSIS — G89.29 CHRONIC BILATERAL LOW BACK PAIN WITHOUT SCIATICA: Primary | ICD-10-CM

## 2019-09-03 DIAGNOSIS — M99.02 SEGMENTAL AND SOMATIC DYSFUNCTION OF THORACIC REGION: ICD-10-CM

## 2019-09-03 PROCEDURE — 98926 OSTEOPATH MANJ 3-4 REGIONS: CPT | Performed by: FAMILY MEDICINE

## 2019-09-03 PROCEDURE — 99213 OFFICE O/P EST LOW 20 MIN: CPT | Performed by: FAMILY MEDICINE

## 2019-09-03 NOTE — PROGRESS NOTES
Subjective   Dipak Marinelli is a 54 y.o. male.   Pt presents today with CC of back pain      History of Present Illness   Patient is here complaining of chronic back pain.  Located mostly in his low back, also somewhat in his thoracic spine.  He has had surgical interventions which were not helpful, he has been on narcotics in the past, he does not wish to be back on narcotics.  He has an upcoming bariatric surgery.  He has done well recently with osteopathic manipulation.  If indicated, he would like manipulation today.  His back pain was greatly improved after manipulation last time, and he is continued to have overall improvement.  He is also utilizing home exercise program and routine stretching of his hips and pelvis.           The following portions of the patient's history were reviewed and updated as appropriate: allergies, current medications, past family history, past social history, past surgical history and problem list.    Review of Systems   Constitutional: Negative for chills, fever and unexpected weight loss.   HENT: Negative for congestion and sore throat.    Eyes: Negative for blurred vision and visual disturbance.   Gastrointestinal: Negative for abdominal pain and diarrhea.   Endocrine: Negative for cold intolerance and heat intolerance.   Genitourinary: Negative for dysuria.   Musculoskeletal: Positive for back pain. Negative for arthralgias, gait problem, neck pain and neck stiffness.   Neurological: Negative for dizziness, seizures and syncope.   Psychiatric/Behavioral: Negative for self-injury, suicidal ideas and depressed mood.       Objective   Physical Exam   Constitutional: He is oriented to person, place, and time. He appears well-developed and well-nourished.   HENT:   Head: Normocephalic and atraumatic.   Eyes: Conjunctivae and EOM are normal. Pupils are equal, round, and reactive to light.   Neck: Normal range of motion. Neck supple.   Cardiovascular: Normal rate, regular rhythm and  normal heart sounds.   Pulmonary/Chest: Effort normal and breath sounds normal.   Abdominal: Soft. Bowel sounds are normal.   Musculoskeletal:   Prominent surgical scars from level of about L3 down to the sacrum, then scars over his SI joints bilaterally, large well-healed scar noted on his right flank pain pump.  All scars are well-healed.   poor flexibility through his hamstrings and quads.  Tender to palpation over the midline of L4-S1, also over his right SI joint.  Osteopathic: T12 flexed, side bent and rotated to the left  L2 is flexed side bent right and rotated right  L4-S1 surgically fused.  Short leg on the right, after hips accounted for, three quarters of an inch discrepancy.  This reportedly is due to knee replacement on the left that was too long.  This results in a right anterior hip   Neurological: He is alert and oriented to person, place, and time.   Skin: Skin is warm and dry.   Nursing note and vitals reviewed.        Assessment/Plan   Dipak was seen today for weight loss.    Diagnoses and all orders for this visit:    Chronic bilateral low back pain without sciatica  Recommended he continue home exercise program, stretches, and recommended quarter-inch heel lift.  Rather than referral, he is going to try to make his own with old tennis shoe pad.  This is an interest of cost.  This is reasonable.    Segmental and somatic dysfunction of thoracic region  OMT procedure, expected risks and benefits discussed with patient, who elects to proceed. Written consent obtained. 3 techniques were used. Pt tolerated OMT well. No complications noted. Patient to do home stretches as shown in clinic today or instructed in after visit summary.    Segmental and somatic dysfunction of lumbar region  As above  Segmental and somatic dysfunction of pelvic region    As above         Patient is a current everyday smoker.  At this time he has no interest in quitting.  I spent 3  minutes counseling the  patient.        Patient's Body mass index is 41.01 kg/m². BMI is above normal parameters. Recommendations include: exercise counseling and nutrition counseling.

## 2019-09-10 ENCOUNTER — DOCUMENTATION (OUTPATIENT)
Dept: BARIATRICS/WEIGHT MGMT | Facility: CLINIC | Age: 54
End: 2019-09-10

## 2019-09-10 ENCOUNTER — OFFICE VISIT (OUTPATIENT)
Dept: BARIATRICS/WEIGHT MGMT | Facility: CLINIC | Age: 54
End: 2019-09-10

## 2019-09-10 ENCOUNTER — RESULTS ENCOUNTER (OUTPATIENT)
Dept: BARIATRICS/WEIGHT MGMT | Facility: CLINIC | Age: 54
End: 2019-09-10

## 2019-09-10 VITALS
DIASTOLIC BLOOD PRESSURE: 105 MMHG | OXYGEN SATURATION: 99 % | WEIGHT: 278.5 LBS | RESPIRATION RATE: 18 BRPM | BODY MASS INDEX: 39.87 KG/M2 | SYSTOLIC BLOOD PRESSURE: 153 MMHG | TEMPERATURE: 97.6 F | HEIGHT: 70 IN | HEART RATE: 102 BPM

## 2019-09-10 DIAGNOSIS — K21.9 GASTROESOPHAGEAL REFLUX DISEASE, ESOPHAGITIS PRESENCE NOT SPECIFIED: ICD-10-CM

## 2019-09-10 DIAGNOSIS — R19.00 ABDOMINAL WALL BULGE: ICD-10-CM

## 2019-09-10 DIAGNOSIS — I10 ESSENTIAL HYPERTENSION: ICD-10-CM

## 2019-09-10 DIAGNOSIS — F17.200 CURRENT SMOKER: ICD-10-CM

## 2019-09-10 DIAGNOSIS — M54.9 BACK PAIN, UNSPECIFIED BACK LOCATION, UNSPECIFIED BACK PAIN LATERALITY, UNSPECIFIED CHRONICITY: ICD-10-CM

## 2019-09-10 DIAGNOSIS — M19.90 OSTEOARTHRITIS, UNSPECIFIED OSTEOARTHRITIS TYPE, UNSPECIFIED SITE: ICD-10-CM

## 2019-09-10 DIAGNOSIS — R53.83 FATIGUE, UNSPECIFIED TYPE: Primary | ICD-10-CM

## 2019-09-10 DIAGNOSIS — E66.01 OBESITY, CLASS III, BMI 40-49.9 (MORBID OBESITY) (HCC): ICD-10-CM

## 2019-09-10 DIAGNOSIS — E78.5 HYPERLIPIDEMIA, UNSPECIFIED HYPERLIPIDEMIA TYPE: ICD-10-CM

## 2019-09-10 PROCEDURE — 99214 OFFICE O/P EST MOD 30 MIN: CPT | Performed by: SURGERY

## 2019-09-10 NOTE — PROGRESS NOTES
[facility] GROUP BARIATRIC SURGERY  [address]  [facility] [address]  [phone]      Patient  Name:  Dipak Marinelli  :  1965      Date of Visit: 9/10/2019      Chief Complaint:  weight gain; unable to maintain weight loss    History of Present Illness:  Dipak Marinelli is a 54 y.o. male who presents today for evaluation, education and consultation regarding weight loss surgery. The patient is interested in sleeve gastrectomy.     He was interested in band in [place], but his nurse practitioner talked him out of it and instead he came here.      Dipak has been overweight for at least 50 years, has been 35 pounds or more overweight for at least 50 years, has been 100 pounds or more overweight for 35 or more years and started dieting at age 50.  The patient describes their eating habits as emotional eater, snacker, volume eater, boredom eater.      Previous diet attempts include:decreasing calories, exercise, metformin.  The most weight Dipak lost was 100 pounds on exercise, strenous labor but was only able to maintain that weight loss for several years.  His maximum lifetime weight is 350 pounds.  When he hurt his back, his weight went up due to inactivity.    As above, patient has been overweight for many years, with numerous failed dietary/weight loss attempts.  She now has obesity related comorbidities and as such has decided to pursue weight loss surgery.    All past medical, surgical, social and family history have been obtained and discussed as pertinent to bariatric surgery as below.     No personal or family hx of VTE or clotting disorder.        He has CAD and has 9 heart stents.  Takes Plavix and ASA 81 mg daily, but off currently for having tooth extractions.  Reports his cardiologist would allow him to be off anti-platelets x 6 weeks post op and 1 weeks preop, since his stents were >2 years old.  His cardiologist is in [place].      +Asthma.  Has been hospitalized  "for asthma and received nebulizer.      Denies liver or kidney disease.  History of heavy alcohol use.  Has been sober x 7 years.    He lives with his wife in [place] and they have 7 biological children and 11 adopted children.    On metformin as a weight loss medicine.    +GERD: Sent by Dr. Chadwick Mobley re: HH and weight loss surgery.  Reviewed his pH monitoring, DeMeester score is 58 (significant reflux).    2018 EGD report from Dr. Mobley reviewed, \"Large hiatal hernia\", GE junction 40 cm.  Clinical photos from this EGD reviewed, but could not see a picture of the hernia.    Past Medical History:   Diagnosis Date   • Anxiety    • Asthma     Has been on oral steroids for asthma but it has been 10 years ago   • Back pain    • Cancer (CMS/HCC)    • GENTILE (chronic airflow obstruction) (CMS/HCC)    • Coronary artery disease     s/p 8 stents, most recently 2017.  Cardiologist is in [place].   • DDD (degenerative disc disease), cervical    • Depression    • Fatigue    • GERD (gastroesophageal reflux disease)     mostly controlled on Aciphex.  +HH.  Discovered on EGD by Dr. Mobley.    • Hypertension    • Insomnia    • Mixed hyperlipidemia 10/18/2018    on a statin, but mostly due to prevention per cardiologist   • Neck pain    • Osteoarthritis    • Spinal stenosis      Past Surgical History:   Procedure Laterality Date   • BACK SURGERY  1990    (total of 8 back surgeries) [facility]   • BRAVO PROCEDURE N/A 9/19/2018    Procedure: ESOPHAGOGASTRODUODENOSCOPY AND WILDER;  Surgeon: Chadwick Mobley MD;  Location: [facility];  Service: Gastroenterology   • CARDIAC CATHETERIZATION     • CARDIOVASCULAR STRESS TEST     • CORONARY ANGIOPLASTY WITH STENT PLACEMENT     • ELBOW PROCEDURE     • ENDOSCOPY      2018 EGD report from Dr. Mobley reviewed, \"Large hiatal hernia\", GE junction 40 cm.  Clinical photos from this EGD reviewed, but could not see a picture of the hernia.   • HERNIA REPAIR      incisional from anterior spinal fusion " incision, repaired with mesh, performed in [place]   • LAPAROSCOPIC CHOLECYSTECTOMY      stones, in [place], [place] Dr. Arana   • NECK SURGERY     • OTHER SURGICAL HISTORY      elbow surgery   • SPINAL CORD STIMULATOR IMPLANT  2015    and removal   • TOTAL KNEE ARTHROPLASTY Left        Allergies   Allergen Reactions   • Penicillins Hives     Has tolerated Keflex without issue   • Tape      silk       Current Outpatient Medications:   •  amLODIPine (NORVASC) 5 MG tablet, Take 1 tablet by mouth Daily., Disp: 90 tablet, Rfl: 0  •  clopidogrel (PLAVIX) 75 MG tablet, Take 1 tablet by mouth Daily., Disp: 90 tablet, Rfl: 0  •  EQ ASPIRIN ADULT LOW DOSE 81 MG EC tablet, Take 1 tablet by mouth Daily., Disp: 90 tablet, Rfl: 1  •  FLUoxetine (PROzac) 20 MG capsule, Take 3 capsules by mouth Daily., Disp: 270 capsule, Rfl: 1  •  isosorbide mononitrate (IMDUR) 30 MG 24 hr tablet, Take 1 tablet by mouth Daily., Disp: 90 tablet, Rfl: 0  •  lisinopril (PRINIVIL,ZESTRIL) 20 MG tablet, Take 1 tablet by mouth Daily., Disp: 90 tablet, Rfl: 0  •  loratadine (CLARITIN) 10 MG tablet, Take 1 tablet by mouth Daily., Disp: 90 tablet, Rfl: 2  •  metFORMIN (GLUCOPHAGE) 500 MG tablet, Take 1 tablet by mouth 2 (Two) Times a Day With Meals., Disp: 60 tablet, Rfl: 1  •  metoprolol succinate XL (TOPROL-XL) 25 MG 24 hr tablet, Take 1 tablet by mouth Daily., Disp: 90 tablet, Rfl: 0  •  nicotine polacrilex (NICORETTE) 4 MG gum, Chew 1 each As Needed for Smoking Cessation., Disp: 50 each, Rfl: 2  •  RABEprazole (ACIPHEX) 20 MG EC tablet, Take 1 tablet by mouth Daily., Disp: 90 tablet, Rfl: 0  •  ranolazine (RANEXA) 500 MG 12 hr tablet, Take 1 tablet by mouth 2 (Two) Times a Day., Disp: 180 tablet, Rfl: 0  •  rosuvastatin (CRESTOR) 10 MG tablet, Take 1 tablet by mouth Daily., Disp: 90 tablet, Rfl: 0  •  VENTOLIN  (90 Base) MCG/ACT inhaler, Inhale 1 puff Every 4 (Four) Hours As Needed for Wheezing., Disp: 1 inhaler, Rfl: 2  •  albuterol (PROVENTIL) (2.5  MG/3ML) 0.083% nebulizer solution, Take 2.5 mg by nebulization Every 4 (Four) Hours As Needed for Wheezing., Disp: 30 vial, Rfl: 2  •  nitroglycerin (NITROSTAT) 0.4 MG SL tablet, Place 1 tablet under the tongue Every 5 (Five) Minutes As Needed for Chest Pain. Take no more than 3 doses in 15 minutes., Disp: 30 tablet, Rfl: 1  •  ondansetron ODT (ZOFRAN-ODT) 4 MG disintegrating tablet, Take 4 mg by mouth Every 8 (Eight) Hours As Needed for Nausea or Vomiting., Disp: , Rfl:     Social History     Socioeconomic History   • Marital status:      Spouse name: Not on file   • Number of children: Not on file   • Years of education: Not on file   • Highest education level: Not on file   Tobacco Use   • Smoking status: Current Every Day Smoker     Packs/day: 0.50     Types: Cigarettes   • Smokeless tobacco: Never Used   • Tobacco comment: using nicotine gum to quit, Is around secondhand smoke in house and car.   Substance and Sexual Activity   • Alcohol use: Yes     Frequency: 2-4 times a month     Drinks per session: 1 or 2     Comment: formerly alcoholic   • Drug use: Yes     Types: Marijuana     Comment: smokes marijuana 2-3x per month.     • Sexual activity: Defer   Social History Narrative    Stays home due to disability for back, heart disease.  Formerly in commercial constructions.  Lives wife and 1 child.      Family History   Problem Relation Age of Onset   • Heart disease Mother    • Heart disease Father    • Diabetes Father    • Depression Sister    • Lupus Sister        Review of Systems   Constitutional: Positive for fatigue and unexpected weight gain. Negative for chills, diaphoresis, fever and unexpected weight loss.   HENT: Negative for congestion and facial swelling.    Eyes: Negative for blurred vision, double vision and discharge.   Respiratory: Negative for chest tightness, shortness of breath and stridor.    Cardiovascular: Negative for chest pain, palpitations and leg swelling.   Gastrointestinal:  Negative for blood in stool.   Endocrine: Negative for polydipsia.   Genitourinary: Negative for hematuria.   Musculoskeletal: Positive for arthralgias.   Skin: Negative for color change.   Allergic/Immunologic: Negative for immunocompromised state.   Neurological: Negative for confusion.   Psychiatric/Behavioral: Negative for self-injury.        Physical Exam:  Vital Signs:  Weight: 126 kg (278 lb 8 oz)   Body mass index is 40.54 kg/m².  Temp: 97.6 °F (36.4 °C)   Heart Rate: 102   BP: (!) 153/105(Pt has not taken BP meds)     Physical Exam   Constitutional: He is oriented to person, place, and time. He appears well-developed and well-nourished.   HENT:   Head: Normocephalic and atraumatic.   Nose: Nose normal.   Eyes: Conjunctivae and EOM are normal. Pupils are equal, round, and reactive to light.   Neck: Normal range of motion. Neck supple. Carotid bruit is not present. No tracheal deviation present. No thyromegaly present.   Cardiovascular: Normal rate, regular rhythm and normal heart sounds.   Pulmonary/Chest: Effort normal and breath sounds normal. No respiratory distress.   Abdominal: Soft. He exhibits no distension. There is no hepatosplenomegaly. There is no tenderness.       Musculoskeletal: Normal range of motion. He exhibits no edema or deformity.   Neurological: He is alert and oriented to person, place, and time. No cranial nerve deficit. Coordination normal.   Skin: Skin is warm and dry. No rash noted.   Hairless, shiny lower legs   Psychiatric: He has a normal mood and affect. His behavior is normal. Judgment and thought content normal.   Vitals reviewed.      Patient Active Problem List   Diagnosis   • Degenerative disc disease, lumbar   • History of lumbar surgery   • Chronic bilateral low back pain   • BMI 40.0-44.9, adult (CMS/HCC)   • Generalized abdominal pain   • Non-recurrent unilateral inguinal hernia without obstruction or gangrene   • History of angina pectoris   • Coronary artery disease  involving native coronary artery of native heart without angina pectoris   • Mixed hyperlipidemia   • GERD with esophagitis   • Recurrent major depressive disorder, in partial remission (CMS/HCC)   • Dyspnea on exertion   • Total knee replacement status, left   • Seasonal allergies   • Tobacco abuse   • Weight loss counseling, encounter for   • Debility   • Segmental and somatic dysfunction of thoracic region   • Segmental and somatic dysfunction of lumbar region   • Segmental and somatic dysfunction of pelvic region       Assessment:    Dipak Marinelli is a 54 y.o. year old male with medically complicated obesity pursuing sleeve gastrectomy.    Weight loss surgery is deemed medically necessary given the following obesity related comorbidities including hypertension, dyslipidemia, cardiovascular disease, osteoarthritis, back pain, knee pain, GERD and asthma with current Weight: 126 kg (278 lb 8 oz) and Body mass index is 40.54 kg/m²..    He is a good candidate for weight loss surgery pending further evaluation.    Plan:  The consultation plan and program requirements were reviewed with the patient.  The patient has been advised that a letter of medical support must be obtained from his primary care physician or referring provider. A psychological evaluation will be arranged.  A nutritional evaluation will be performed.  The patient was advised to start a high protein and low carbohydrate diet.  Necessary lifestyle modifications were discussed.  Instructions on how to access EFREN was given to the patient.  EFREN is an internet based educational video that explains the surgical procedure chosen and answers basic questions regarding that procedure.     Preoperative testing will include: CBC, CMP, Lipids, TSH, HgA1C, H.Pylori, Pulmonary Function Testing, Nicotine Testing, CXR, EKG and EGD (Reviewed dr. Mobley's EGD report)    Preop clearances required prior to surgery will include Cardiac and Pulmonary.      Will obtain  non-contrast CT scan to evaluate left abdominal bulge.  On exam, it does not appear to be a hernia, and I think it more likely represents some laxity from denervation of the left rectus vs. Diastasis recti.      Patient understands that bariatric surgery is not cosmetic surgery but rather a tool to help make a lifelong commitment to lifestyle changes including diet, exercise, behavior modifications, and healthy habits.  The patient has been educated on expected postoperative lifestyle changes, including commitment to high protein diet, vitamin regimen, and exercise program.  They are aware that support groups are encouraged for optimal weight loss results.            Sonya Barr MD

## 2019-09-11 LAB
ALBUMIN SERPL-MCNC: 4.4 G/DL (ref 3.5–5.5)
ALBUMIN/GLOB SERPL: 1.7 {RATIO} (ref 1.2–2.2)
ALP SERPL-CCNC: 71 IU/L (ref 39–117)
ALT SERPL-CCNC: 17 IU/L (ref 0–44)
AST SERPL-CCNC: 18 IU/L (ref 0–40)
BASOPHILS # BLD AUTO: 0.1 X10E3/UL (ref 0–0.2)
BASOPHILS NFR BLD AUTO: 1 %
BILIRUB SERPL-MCNC: <0.2 MG/DL (ref 0–1.2)
BUN SERPL-MCNC: 18 MG/DL (ref 6–24)
BUN/CREAT SERPL: 19 (ref 9–20)
CALCIUM SERPL-MCNC: 9.3 MG/DL (ref 8.7–10.2)
CHLORIDE SERPL-SCNC: 109 MMOL/L (ref 96–106)
CHOLEST SERPL-MCNC: 185 MG/DL (ref 100–199)
CO2 SERPL-SCNC: 19 MMOL/L (ref 20–29)
CREAT SERPL-MCNC: 0.97 MG/DL (ref 0.76–1.27)
EOSINOPHIL # BLD AUTO: 0.5 X10E3/UL (ref 0–0.4)
EOSINOPHIL NFR BLD AUTO: 4 %
ERYTHROCYTE [DISTWIDTH] IN BLOOD BY AUTOMATED COUNT: 14.1 % (ref 12.3–15.4)
GLOBULIN SER CALC-MCNC: 2.6 G/DL (ref 1.5–4.5)
GLUCOSE SERPL-MCNC: 102 MG/DL (ref 65–99)
HBA1C MFR BLD: 6.1 % (ref 4.8–5.6)
HCT VFR BLD AUTO: 41.8 % (ref 37.5–51)
HDLC SERPL-MCNC: 31 MG/DL
HGB BLD-MCNC: 14.4 G/DL (ref 13–17.7)
IMM GRANULOCYTES # BLD AUTO: 0 X10E3/UL (ref 0–0.1)
IMM GRANULOCYTES NFR BLD AUTO: 0 %
LDLC SERPL CALC-MCNC: 81 MG/DL (ref 0–99)
LYMPHOCYTES # BLD AUTO: 4.3 X10E3/UL (ref 0.7–3.1)
LYMPHOCYTES NFR BLD AUTO: 38 %
MCH RBC QN AUTO: 30.4 PG (ref 26.6–33)
MCHC RBC AUTO-ENTMCNC: 34.4 G/DL (ref 31.5–35.7)
MCV RBC AUTO: 88 FL (ref 79–97)
MONOCYTES # BLD AUTO: 1 X10E3/UL (ref 0.1–0.9)
MONOCYTES NFR BLD AUTO: 9 %
NEUTROPHILS # BLD AUTO: 5.5 X10E3/UL (ref 1.4–7)
NEUTROPHILS NFR BLD AUTO: 48 %
PLATELET # BLD AUTO: 245 X10E3/UL (ref 150–450)
POTASSIUM SERPL-SCNC: 4.4 MMOL/L (ref 3.5–5.2)
PROT SERPL-MCNC: 7 G/DL (ref 6–8.5)
RBC # BLD AUTO: 4.73 X10E6/UL (ref 4.14–5.8)
SODIUM SERPL-SCNC: 143 MMOL/L (ref 134–144)
TRIGL SERPL-MCNC: 365 MG/DL (ref 0–149)
TSH SERPL DL<=0.005 MIU/L-ACNC: 0.47 UIU/ML (ref 0.45–4.5)
VLDLC SERPL CALC-MCNC: 73 MG/DL (ref 5–40)
WBC # BLD AUTO: 11.3 X10E3/UL (ref 3.4–10.8)

## 2019-09-11 NOTE — PROGRESS NOTES
"Weight Loss Surgery  Presurgical Nutrition Assessment     Dipak Marinelli  09/10/2019  [id number]  [id number]  1965  male    Surgery desired: Sleeve Gastrectomy    Weight: 126 kg (278 lb 8 oz)   Body mass index is 40.54 kg/m².  Past Medical History:   Diagnosis Date   • Anxiety    • Asthma     Has been on oral steroids for asthma but it has been 10 years ago   • Back pain    • Cancer (CMS/HCC)    • GENTILE (chronic airflow obstruction) (CMS/HCC)    • Coronary artery disease     s/p 8 stents, most recently 2017.  Cardiologist is in [place].   • DDD (degenerative disc disease), cervical    • Depression    • Fatigue    • GERD (gastroesophageal reflux disease)     mostly controlled on Aciphex.  +HH.  Discovered on EGD by Dr. Mobley.    • Hypertension    • Insomnia    • Mixed hyperlipidemia 10/18/2018    on a statin, but mostly due to prevention per cardiologist   • Neck pain    • Osteoarthritis    • Spinal stenosis      Past Surgical History:   Procedure Laterality Date   • BACK SURGERY  1990    (total of 8 back surgeries) [facility]   • BRAVO PROCEDURE N/A 9/19/2018    Procedure: ESOPHAGOGASTRODUODENOSCOPY AND WILDER;  Surgeon: Chadwick Mobley MD;  Location: [facility];  Service: Gastroenterology   • CARDIAC CATHETERIZATION     • CARDIOVASCULAR STRESS TEST     • CORONARY ANGIOPLASTY WITH STENT PLACEMENT     • ELBOW PROCEDURE     • ENDOSCOPY      2018 EGD report from Dr. Mobley reviewed, \"Large hiatal hernia\", GE junction 40 cm.  Clinical photos from this EGD reviewed, but could not see a picture of the hernia.   • HERNIA REPAIR      incisional from anterior spinal fusion incision, repaired with mesh, performed in [place]   • LAPAROSCOPIC CHOLECYSTECTOMY      stones, in [place], [place] Dr. Arana   • NECK SURGERY     • OTHER SURGICAL HISTORY      elbow surgery   • SPINAL CORD STIMULATOR IMPLANT  2015    and removal   • TOTAL KNEE ARTHROPLASTY Left      Allergies   Allergen Reactions   • Penicillins Hives     Has tolerated " Keflex without issue   • Tape      silk       Current Outpatient Medications:   •  albuterol (PROVENTIL) (2.5 MG/3ML) 0.083% nebulizer solution, Take 2.5 mg by nebulization Every 4 (Four) Hours As Needed for Wheezing., Disp: 30 vial, Rfl: 2  •  amLODIPine (NORVASC) 5 MG tablet, Take 1 tablet by mouth Daily., Disp: 90 tablet, Rfl: 0  •  clopidogrel (PLAVIX) 75 MG tablet, Take 1 tablet by mouth Daily., Disp: 90 tablet, Rfl: 0  •  EQ ASPIRIN ADULT LOW DOSE 81 MG EC tablet, Take 1 tablet by mouth Daily., Disp: 90 tablet, Rfl: 1  •  FLUoxetine (PROzac) 20 MG capsule, Take 3 capsules by mouth Daily., Disp: 270 capsule, Rfl: 1  •  isosorbide mononitrate (IMDUR) 30 MG 24 hr tablet, Take 1 tablet by mouth Daily., Disp: 90 tablet, Rfl: 0  •  lisinopril (PRINIVIL,ZESTRIL) 20 MG tablet, Take 1 tablet by mouth Daily., Disp: 90 tablet, Rfl: 0  •  loratadine (CLARITIN) 10 MG tablet, Take 1 tablet by mouth Daily., Disp: 90 tablet, Rfl: 2  •  metFORMIN (GLUCOPHAGE) 500 MG tablet, Take 1 tablet by mouth 2 (Two) Times a Day With Meals., Disp: 60 tablet, Rfl: 1  •  metoprolol succinate XL (TOPROL-XL) 25 MG 24 hr tablet, Take 1 tablet by mouth Daily., Disp: 90 tablet, Rfl: 0  •  nicotine polacrilex (NICORETTE) 4 MG gum, Chew 1 each As Needed for Smoking Cessation., Disp: 50 each, Rfl: 2  •  nitroglycerin (NITROSTAT) 0.4 MG SL tablet, Place 1 tablet under the tongue Every 5 (Five) Minutes As Needed for Chest Pain. Take no more than 3 doses in 15 minutes., Disp: 30 tablet, Rfl: 1  •  ondansetron ODT (ZOFRAN-ODT) 4 MG disintegrating tablet, Take 4 mg by mouth Every 8 (Eight) Hours As Needed for Nausea or Vomiting., Disp: , Rfl:   •  RABEprazole (ACIPHEX) 20 MG EC tablet, Take 1 tablet by mouth Daily., Disp: 90 tablet, Rfl: 0  •  ranolazine (RANEXA) 500 MG 12 hr tablet, Take 1 tablet by mouth 2 (Two) Times a Day., Disp: 180 tablet, Rfl: 0  •  rosuvastatin (CRESTOR) 10 MG tablet, Take 1 tablet by mouth Daily., Disp: 90 tablet, Rfl: 0  •   VENTOLIN  (90 Base) MCG/ACT inhaler, Inhale 1 puff Every 4 (Four) Hours As Needed for Wheezing., Disp: 1 inhaler, Rfl: 2      Nutrition Assessment    Estimated energy needs: 2300    Estimated calories for weight loss: 1800    IBW (Pounds):  185      Excess body weight (Pounds): 93       Nutrition Recall  24 Hour recall: (B) (L) (D) -  Reviewed and discussed with patient       Exercise  daily      Education    Provided manual for  Sleeve Gastrectomy and reviewed necessary vitamin and protein supplementation for surgery.       Recommend that team follow per protocol.      Nutrition Goals   Dietary Guidelines per manual  Protein goal:  grams per day     Exercise Goals  Continue current exercise routine   Add 15-30 minutes of activity per day as tolerated        Dede Roberts RD  09/10/2019  3:29 PM

## 2019-09-13 ENCOUNTER — OFFICE VISIT (OUTPATIENT)
Dept: FAMILY MEDICINE CLINIC | Facility: CLINIC | Age: 54
End: 2019-09-13

## 2019-09-13 VITALS
SYSTOLIC BLOOD PRESSURE: 130 MMHG | OXYGEN SATURATION: 97 % | DIASTOLIC BLOOD PRESSURE: 78 MMHG | HEART RATE: 95 BPM | HEIGHT: 69 IN | BODY MASS INDEX: 41.23 KG/M2 | WEIGHT: 278.4 LBS | TEMPERATURE: 98.9 F | RESPIRATION RATE: 20 BRPM

## 2019-09-13 DIAGNOSIS — Z02.9 ADMINISTRATIVE ENCOUNTER: ICD-10-CM

## 2019-09-13 DIAGNOSIS — Z71.89 ENCOUNTER FOR PRE-BARIATRIC SURGERY COUNSELING AND EDUCATION: ICD-10-CM

## 2019-09-13 DIAGNOSIS — R19.06 EPIGASTRIC MASS: ICD-10-CM

## 2019-09-13 DIAGNOSIS — Z71.3 WEIGHT LOSS COUNSELING, ENCOUNTER FOR: ICD-10-CM

## 2019-09-13 DIAGNOSIS — F17.200 CURRENT EVERY DAY SMOKER: Primary | ICD-10-CM

## 2019-09-13 PROCEDURE — 99214 OFFICE O/P EST MOD 30 MIN: CPT | Performed by: FAMILY MEDICINE

## 2019-09-17 ENCOUNTER — HOSPITAL ENCOUNTER (OUTPATIENT)
Dept: CT IMAGING | Facility: HOSPITAL | Age: 54
Discharge: HOME | End: 2019-09-17
Admitting: SURGERY

## 2019-09-17 DIAGNOSIS — R19.00 ABDOMINAL WALL BULGE: ICD-10-CM

## 2019-09-17 PROCEDURE — 74176 CT ABD & PELVIS W/O CONTRAST: CPT | Performed by: RADIOLOGY

## 2019-09-17 PROCEDURE — 74176 CT ABD & PELVIS W/O CONTRAST: CPT

## 2019-09-18 ENCOUNTER — OFFICE VISIT (OUTPATIENT)
Dept: FAMILY MEDICINE CLINIC | Facility: CLINIC | Age: 54
End: 2019-09-18

## 2019-09-18 VITALS
BODY MASS INDEX: 42.09 KG/M2 | TEMPERATURE: 98.8 F | SYSTOLIC BLOOD PRESSURE: 138 MMHG | HEIGHT: 69 IN | OXYGEN SATURATION: 98 % | WEIGHT: 284.2 LBS | HEART RATE: 85 BPM | DIASTOLIC BLOOD PRESSURE: 90 MMHG

## 2019-09-18 DIAGNOSIS — G89.29 CHRONIC RIGHT-SIDED LOW BACK PAIN WITH BILATERAL SCIATICA: Primary | ICD-10-CM

## 2019-09-18 DIAGNOSIS — M99.04 SEGMENTAL AND SOMATIC DYSFUNCTION OF SACRAL REGION: ICD-10-CM

## 2019-09-18 DIAGNOSIS — M99.00 SEGMENTAL AND SOMATIC DYSFUNCTION OF HEAD REGION: ICD-10-CM

## 2019-09-18 DIAGNOSIS — K21.00 GERD WITH ESOPHAGITIS: ICD-10-CM

## 2019-09-18 DIAGNOSIS — M99.01 SEGMENTAL AND SOMATIC DYSFUNCTION OF CERVICAL REGION: ICD-10-CM

## 2019-09-18 DIAGNOSIS — M99.08 SEGMENTAL AND SOMATIC DYSFUNCTION OF RIB CAGE: ICD-10-CM

## 2019-09-18 DIAGNOSIS — M54.42 CHRONIC RIGHT-SIDED LOW BACK PAIN WITH BILATERAL SCIATICA: Primary | ICD-10-CM

## 2019-09-18 DIAGNOSIS — M99.03 SEGMENTAL AND SOMATIC DYSFUNCTION OF LUMBAR REGION: ICD-10-CM

## 2019-09-18 DIAGNOSIS — R10.84 GENERALIZED ABDOMINAL PAIN: ICD-10-CM

## 2019-09-18 DIAGNOSIS — M54.41 CHRONIC RIGHT-SIDED LOW BACK PAIN WITH BILATERAL SCIATICA: Primary | ICD-10-CM

## 2019-09-18 DIAGNOSIS — M54.2 NECK PAIN: ICD-10-CM

## 2019-09-18 PROCEDURE — 99213 OFFICE O/P EST LOW 20 MIN: CPT | Performed by: FAMILY MEDICINE

## 2019-09-18 PROCEDURE — 98927 OSTEOPATH MANJ 5-6 REGIONS: CPT | Performed by: FAMILY MEDICINE

## 2019-09-18 NOTE — PROGRESS NOTES
Subjective   Dipak Marinelli is a 54 y.o. male.   Pt presents today with CC of Back Pain      History of Present Illness   He is here today to follow-up on back pain.  He states that he is also had some headaches that he feels radiates up from his neck.  He would like this evaluated as well.  If indicated he would like manipulation today.         The following portions of the patient's history were reviewed and updated as appropriate: allergies, current medications, past family history, past social history, past surgical history and problem list.    Review of Systems   Constitutional: Negative for chills, fever and unexpected weight loss.   HENT: Negative for congestion and sore throat.    Eyes: Negative for blurred vision and visual disturbance.   Respiratory: Negative for cough and wheezing.    Cardiovascular: Negative for chest pain and palpitations.   Gastrointestinal: Negative for abdominal pain and diarrhea.   Endocrine: Negative for cold intolerance and heat intolerance.   Genitourinary: Negative for dysuria.   Musculoskeletal: Positive for back pain and neck pain. Negative for arthralgias and neck stiffness.   Skin: Negative for rash.   Neurological: Positive for headache. Negative for dizziness, seizures and syncope.   Psychiatric/Behavioral: Negative for self-injury, suicidal ideas and depressed mood.       Objective   Physical Exam   Constitutional: He is oriented to person, place, and time. He appears well-developed and well-nourished.   HENT:   Head: Normocephalic and atraumatic.   Neck: Normal range of motion. Neck supple.   Abdominal: Soft. Bowel sounds are normal.   Musculoskeletal:   Hips, knees, and ankles with full range of motion and 5/5 strength bilaterally. DTRs symmetrical. Straight leg raise test negative bilaterally.  Shoulders, elbows, and wrists with full range of motion and 5/5 strength bilaterally. DTRs symmetrical. Neck with full range of motion in flexion, extension, side-bending, and  rotation.  Osteopathic OA is flexed side bent right rotated left  C3 and C4 are limited in all ranges of motion consistent with fusion.  C7 flexed side bent and rotated to the left.\  First rib elevated on the right associated with scalene spasm  L1 and 2 are side bent left rotated left, extended  Sacrum is right on left associated with tender point over L5 on the right   Neurological: He is alert and oriented to person, place, and time.   Nursing note and vitals reviewed.        Assessment/Plan   Dipak was seen today for back pain.    Diagnoses and all orders for this visit:    Chronic right-sided low back pain with bilateral sciatica  We will continue current medical treatment.  Manipulation to continue as needed.  Neck pain  Will work toward helping him with his headaches.  His headaches are somewhat reproducible with position, I think he will benefit from manipulation.  He had good result today.  We will see if he gets lasting results.  Segmental and somatic dysfunction of lumbar region  OMT procedure, expected risks and benefits discussed with patient, who elects to proceed.  Written consent obtained. 5 techniques were used. Pt tolerated OMT well. No complications noted. Patient to do home stretches as shown in clinic today or instructed in after visit summary.  Segmental and somatic dysfunction of sacral region  As above  Segmental and somatic dysfunction of rib cage  As above  Segmental and somatic dysfunction of head region  As above  Segmental and somatic dysfunction of cervical region    As above               Patient's Body mass index is 41.38 kg/m². BMI is above normal parameters. Recommendations include: exercise counseling and nutrition counseling.

## 2019-09-22 LAB — H PYLORI AG STL QL IA: NEGATIVE

## 2019-09-25 ENCOUNTER — OFFICE VISIT (OUTPATIENT)
Dept: FAMILY MEDICINE CLINIC | Facility: CLINIC | Age: 54
End: 2019-09-25

## 2019-09-25 VITALS
OXYGEN SATURATION: 94 % | BODY MASS INDEX: 42.15 KG/M2 | HEIGHT: 69 IN | SYSTOLIC BLOOD PRESSURE: 126 MMHG | TEMPERATURE: 98.8 F | DIASTOLIC BLOOD PRESSURE: 70 MMHG | HEART RATE: 72 BPM | WEIGHT: 284.6 LBS

## 2019-09-25 DIAGNOSIS — M99.03 SEGMENTAL AND SOMATIC DYSFUNCTION OF LUMBAR REGION: ICD-10-CM

## 2019-09-25 DIAGNOSIS — M54.50 CHRONIC RIGHT-SIDED LOW BACK PAIN WITHOUT SCIATICA: ICD-10-CM

## 2019-09-25 DIAGNOSIS — M99.04 SEGMENTAL AND SOMATIC DYSFUNCTION OF SACRAL REGION: ICD-10-CM

## 2019-09-25 DIAGNOSIS — M99.02 SEGMENTAL AND SOMATIC DYSFUNCTION OF THORACIC REGION: ICD-10-CM

## 2019-09-25 DIAGNOSIS — G89.29 CHRONIC RIGHT-SIDED LOW BACK PAIN WITHOUT SCIATICA: ICD-10-CM

## 2019-09-25 DIAGNOSIS — Z23 IMMUNIZATION DUE: Primary | ICD-10-CM

## 2019-09-25 PROCEDURE — 98926 OSTEOPATH MANJ 3-4 REGIONS: CPT | Performed by: FAMILY MEDICINE

## 2019-09-25 PROCEDURE — 90471 IMMUNIZATION ADMIN: CPT | Performed by: FAMILY MEDICINE

## 2019-09-25 PROCEDURE — 90674 CCIIV4 VAC NO PRSV 0.5 ML IM: CPT | Performed by: FAMILY MEDICINE

## 2019-09-25 PROCEDURE — 99213 OFFICE O/P EST LOW 20 MIN: CPT | Performed by: FAMILY MEDICINE

## 2019-09-25 NOTE — PROGRESS NOTES
Subjective   Dipak Marinelli is a 54 y.o. male.   Pt presents today with CC of Pain      History of Present Illness   Patient complains of chronic back pain.  He has spinal fusion in his lower lumbar spine.  He reports improved pain with manipulation recently.  His pain is located in his right low back at the L5-S1 junction on the right.  The pain radiates into his right buttock.  If indicated he would like manipulation today.  He is here to follow-up on a surgical short leg.  He has used an insole on his short leg to bring his hips closer in alignment.  He reports that this is helped his pain and gait instability.  #2 he would like a flu shot today.         The following portions of the patient's history were reviewed and updated as appropriate: allergies, current medications, past family history, past social history, past surgical history and problem list.    Review of Systems   Constitutional: Negative for chills, fever and unexpected weight loss.   HENT: Negative for congestion and sore throat.    Eyes: Negative for blurred vision and visual disturbance.   Respiratory: Negative for cough and wheezing.    Cardiovascular: Negative for chest pain and palpitations.   Gastrointestinal: Negative for abdominal pain and diarrhea.   Endocrine: Negative for cold intolerance and heat intolerance.   Genitourinary: Negative for dysuria.   Musculoskeletal: Positive for back pain. Negative for arthralgias and neck stiffness.   Neurological: Negative for dizziness, seizures and syncope.   Psychiatric/Behavioral: Negative for self-injury, suicidal ideas and depressed mood.       Objective   Physical Exam   Constitutional: He is oriented to person, place, and time. He appears well-developed and well-nourished.   HENT:   Head: Normocephalic and atraumatic.   Eyes: Conjunctivae are normal.   Neck: Neck supple.   Abdominal: Soft. Bowel sounds are normal.   Musculoskeletal:   Hips, knees, and ankles with full range of motion and 5/5  strength bilaterally. DTRs symmetrical. Straight leg raise test negative bilaterally.  Osteopathic:  Thoracic spine: T4-6 side bent left and rotated right, neutral  Lumbar spine: L2 side bent left and rotated left, flexion  Sacrum: Left on left with right anterior hip   Lymphadenopathy:     He has no cervical adenopathy.   Neurological: He is alert and oriented to person, place, and time.   Skin: Skin is warm and dry.   Nursing note and vitals reviewed.        Assessment/Plan   Dipak was seen today for back pain.    Diagnoses and all orders for this visit:    Immunization due  -     Flucelvax Quad=>4Years ([id number])    Chronic right-sided low back pain without sciatica  Recommended he continue the heel lift.  Is getting good result.  Manipulation today was successful.  Segmental and somatic dysfunction of thoracic region  OMT procedure, expected risks and benefits discussed with patient, who elects to proceed.  Written consent obtained. 3 techniques were used. Pt tolerated OMT well. No complications noted. Patient to do home stretches as shown in clinic today or instructed in after visit summary.    Segmental and somatic dysfunction of lumbar region  As above  Segmental and somatic dysfunction of sacral region  As above               Dipak Marinelli is a current cigarettes user.  He currently smokes 1 pack of cigarettes per day for a duration of 35 years. I have educated him on the risk of diseases from using tobacco products such as cancer, COPD and heart diease.     I advised him to quit and he is willing to quit. We have discussed the following method/s for tobacco cessation:  OTC Cessation Products.  He must quit before his surgery.      Patient's Body mass index is 41.44 kg/m². BMI is above normal parameters. Recommendations include: exercise counseling and nutrition counseling.

## 2019-10-14 ENCOUNTER — OFFICE VISIT (OUTPATIENT)
Dept: FAMILY MEDICINE CLINIC | Facility: CLINIC | Age: 54
End: 2019-10-14

## 2019-10-14 VITALS
HEART RATE: 78 BPM | WEIGHT: 286.6 LBS | HEIGHT: 69 IN | DIASTOLIC BLOOD PRESSURE: 86 MMHG | OXYGEN SATURATION: 97 % | SYSTOLIC BLOOD PRESSURE: 138 MMHG | RESPIRATION RATE: 20 BRPM | BODY MASS INDEX: 42.45 KG/M2 | TEMPERATURE: 98.7 F

## 2019-10-14 DIAGNOSIS — Z71.3 WEIGHT LOSS COUNSELING, ENCOUNTER FOR: ICD-10-CM

## 2019-10-14 DIAGNOSIS — M79.89 SHOULDER SWELLING: Primary | ICD-10-CM

## 2019-10-14 PROCEDURE — 99213 OFFICE O/P EST LOW 20 MIN: CPT | Performed by: FAMILY MEDICINE

## 2019-10-14 NOTE — PROGRESS NOTES
Subjective   Dipak Marinelli is a 54 y.o. male.   Pt presents today with CC of Weight Loss      History of Present Illness   1.  Patient is here to follow-up for weight loss.  He reports that he is following the recommend diet, he is exercising as much as he is able, his chronic pain limits what he can do.  He is still smoking.  He admits that recent problems at home have made it more difficult to follow his diet.  He is still willing to have weight loss surgery.  #2 he had dental surgery on his left upper jaw 4 days ago.  He states that the evening after his surgery he had right shoulder swelling in the supraclavicular area, he said there was no palpable mass, and it was not tender, it has resolved completely at this point, he would like evaluation of this.  He denies any symptoms with this at this point.         The following portions of the patient's history were reviewed and updated as appropriate: allergies, current medications, past medical history, past social history, past surgical history and problem list.    Review of Systems   Constitutional: Negative for chills, fever and unexpected weight loss.   HENT: Negative for congestion and sore throat.    Eyes: Negative for blurred vision and visual disturbance.   Respiratory: Negative for cough and wheezing.    Cardiovascular: Negative for chest pain and palpitations.   Gastrointestinal: Negative for abdominal pain and diarrhea.   Endocrine: Negative for cold intolerance and heat intolerance.   Genitourinary: Negative for dysuria.   Musculoskeletal: Negative for arthralgias and neck stiffness.   Neurological: Negative for dizziness, seizures and syncope.   Psychiatric/Behavioral: Negative for self-injury, suicidal ideas and depressed mood.       Objective   Physical Exam   Constitutional: He is oriented to person, place, and time. He appears well-developed and well-nourished.   HENT:   Head: Normocephalic and atraumatic.   Right Ear: External ear normal.   Left  Ear: External ear normal.   Nose: Nose normal.   Mouth/Throat: Oropharynx is clear and moist.   Eyes: Conjunctivae and EOM are normal. Pupils are equal, round, and reactive to light.   Neck: Normal range of motion. Neck supple.   Cardiovascular: Normal rate, regular rhythm and normal heart sounds.   Pulmonary/Chest: Effort normal and breath sounds normal.   Abdominal: Soft. Bowel sounds are normal.   Musculoskeletal:   Right shoulder appears normal, no lymphadenopathy, no masses, no pulsatile masses.   Neurological: He is alert and oriented to person, place, and time.   Skin: Skin is warm and dry.   Psychiatric: He has a normal mood and affect. His behavior is normal.   Nursing note and vitals reviewed.        Assessment/Plan   Dipak was seen today for weight loss.    Diagnoses and all orders for this visit:    Shoulder swelling    Weight loss counseling, encounter for                 Dipak Marinelli is a current cigarettes user.  He currently smokes 1 pack of cigarettes per day for a duration of 35 years. I have educated him on the risk of diseases from using tobacco products such as cancer, COPD and heart diease.     I advised him to quit and he is willing to quit. We have discussed the following method/s for tobacco cessation:  Prescription Medicaiton.  Together we have set a quit date for 1 week from today.  He will follow up with me in 4 week or sooner to check on his progress.    I spent 6 minutes counseling the patient.        Patient's Body mass index is 41.73 kg/m². BMI is above normal parameters. Recommendations include: exercise counseling and nutrition counseling.

## 2019-10-28 ENCOUNTER — OFFICE VISIT (OUTPATIENT)
Dept: PULMONOLOGY | Facility: CLINIC | Age: 54
End: 2019-10-28

## 2019-10-28 DIAGNOSIS — Z53.21 PATIENT LEFT WITHOUT BEING SEEN: Primary | ICD-10-CM

## 2019-10-29 ENCOUNTER — OFFICE VISIT (OUTPATIENT)
Dept: PULMONOLOGY | Facility: CLINIC | Age: 54
End: 2019-10-29

## 2019-10-29 VITALS
HEIGHT: 70 IN | SYSTOLIC BLOOD PRESSURE: 129 MMHG | TEMPERATURE: 98 F | BODY MASS INDEX: 40.8 KG/M2 | HEART RATE: 75 BPM | OXYGEN SATURATION: 98 % | DIASTOLIC BLOOD PRESSURE: 89 MMHG | WEIGHT: 285 LBS

## 2019-10-29 DIAGNOSIS — Z01.811 PRE-PROCEDURAL RESPIRATORY EXAMINATION: Primary | ICD-10-CM

## 2019-10-29 PROCEDURE — 99202 OFFICE O/P NEW SF 15 MIN: CPT | Performed by: INTERNAL MEDICINE

## 2019-10-29 PROCEDURE — 94010 BREATHING CAPACITY TEST: CPT | Performed by: INTERNAL MEDICINE

## 2019-10-29 NOTE — PROGRESS NOTES
Subjective   Chief Complaint   Patient presents with   • Surgery Clearance       Dipak Marinelli is a 54 y.o. male     History of Present Illness 4-year-old male referred for preop clearance to have gastric sleeve operation.  His past history significant for smoking since age 14 up to 3 packs a day however he cut down for a few cigarettes a day for the last couple of month.  Had coronary stents 2014 and is currently on blood pressure medications and Plavix.  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  Cardiologist is in [place].   • DDD (degenerative disc disease), cervical    • Depression    • Fatigue    • GERD (gastroesophageal reflux disease)     mostly controlled on Aciphex.  +HH.  Discovered on EGD by Dr. Mobley.    • Hypertension    • Insomnia    • Mixed hyperlipidemia 10/18/2018    on a statin, but mostly due to prevention per cardiologist   • Neck pain    • Osteoarthritis    • Spinal stenosis        Past Surgical History:   Procedure Laterality Date   • BACK SURGERY  1990    (total of 8 back surgeries) [facility]   • BRAVO PROCEDURE N/A 9/19/2018    Procedure: ESOPHAGOGASTRODUODENOSCOPY AND WILDER;  Surgeon: Chadwick Mobley MD;  Location: [facility];  Service: Gastroenterology   • CARDIAC CATHETERIZATION     • CARDIOVASCULAR STRESS TEST     • CORONARY ANGIOPLASTY WITH STENT PLACEMENT     • ELBOW PROCEDURE     •  "ENDOSCOPY      2018 EGD report from Dr. Mobley reviewed, \"Large hiatal hernia\", GE junction 40 cm.  Clinical photos from this EGD reviewed, but could not see a picture of the hernia.   • HERNIA REPAIR      incisional from anterior spinal fusion incision, repaired with mesh, performed in [place]   • LAPAROSCOPIC CHOLECYSTECTOMY      stones, in [place], [place] Dr. Arana   • MOUTH SURGERY     • NECK SURGERY     • OTHER SURGICAL HISTORY      elbow surgery   • SPINAL CORD STIMULATOR IMPLANT  2015    and removal   • TOTAL KNEE ARTHROPLASTY Left        Social History     Socioeconomic History   • Marital status:      Spouse name: Not on file   • Number of children: Not on file   • Years of education: Not on file   • Highest education level: Not on file   Tobacco Use   • Smoking status: Current Every Day Smoker     Packs/day: 0.50     Types: Cigarettes   • Smokeless tobacco: Never Used   • Tobacco comment: using nicotine gum to quit, Is around secondhand smoke in house and car.   Substance and Sexual Activity   • Alcohol use: Yes     Frequency: 2-4 times a month     Drinks per session: 1 or 2     Comment: formerly alcoholic   • Drug use: Yes     Types: Marijuana     Comment: smokes marijuana 2-3x per month.     • Sexual activity: Defer   Social History Narrative    Stays home due to disability for back, heart disease.  Formerly in commercial constructions.  Lives wife and 1 child.         Physical Exam: Overweight 205 pounds for a height of 510 vital signs are stable no acute distress lungs clear heart regular no clubbing cyanosis or edema    PFT: Normal spirometry FVC of 102 FEV1 101%    Imaging: Last chest x-ray was seen 20 17 had a CAT scan of the abdomen September 17, 2019 shows most of the lung that is clear    Other Labs:       ASSESSMENT no acute or chronic lung disease .  Clear from pulmonary standpoint to have gastric sleeve operation strongly urged him to quit smoking completely 2 weeks before his surgery and " never smoked and advised him to get annual CAT scan of the chest next August when he is 55 and return for 15 years        Recommendations: Follow-up by family physician and bariatric surgeon    Follow up: Follow-up by primary care and advised him to get an annual flu vaccine and get annual CAT scan age 55 for 15 years

## 2019-11-01 ENCOUNTER — OFFICE VISIT (OUTPATIENT)
Dept: FAMILY MEDICINE CLINIC | Facility: CLINIC | Age: 54
End: 2019-11-01

## 2019-11-01 VITALS
BODY MASS INDEX: 41.26 KG/M2 | TEMPERATURE: 98.8 F | OXYGEN SATURATION: 97 % | HEART RATE: 76 BPM | HEIGHT: 70 IN | DIASTOLIC BLOOD PRESSURE: 68 MMHG | WEIGHT: 288.2 LBS | SYSTOLIC BLOOD PRESSURE: 100 MMHG

## 2019-11-01 DIAGNOSIS — M54.42 CHRONIC BILATERAL LOW BACK PAIN WITH BILATERAL SCIATICA: Primary | ICD-10-CM

## 2019-11-01 DIAGNOSIS — M99.01 SEGMENTAL AND SOMATIC DYSFUNCTION OF CERVICAL REGION: ICD-10-CM

## 2019-11-01 DIAGNOSIS — M54.41 CHRONIC BILATERAL LOW BACK PAIN WITH BILATERAL SCIATICA: Primary | ICD-10-CM

## 2019-11-01 DIAGNOSIS — Z71.3 WEIGHT LOSS COUNSELING, ENCOUNTER FOR: ICD-10-CM

## 2019-11-01 DIAGNOSIS — M99.02 SEGMENTAL AND SOMATIC DYSFUNCTION OF THORACIC REGION: ICD-10-CM

## 2019-11-01 DIAGNOSIS — G89.29 CHRONIC BILATERAL LOW BACK PAIN WITH BILATERAL SCIATICA: Primary | ICD-10-CM

## 2019-11-01 DIAGNOSIS — M99.03 SEGMENTAL AND SOMATIC DYSFUNCTION OF LUMBAR REGION: ICD-10-CM

## 2019-11-01 PROCEDURE — 99214 OFFICE O/P EST MOD 30 MIN: CPT | Performed by: FAMILY MEDICINE

## 2019-11-01 PROCEDURE — 98926 OSTEOPATH MANJ 3-4 REGIONS: CPT | Performed by: FAMILY MEDICINE

## 2019-11-03 NOTE — PROGRESS NOTES
Subjective   Dipak Marinelli is a 54 y.o. male.   Pt presents today with CC of Back Pain (manipulation)      History of Present Illness   1.  Patient has chronic low back pain.  He also has thoracic back pain and he reports today that he has neck pain.  He reports that his low back pain radiates down his buttocks bilaterally down to his knees, it is better with standing, worse with bending over.  He has point tenderness over his right SI and L5-S1 joint, this area remains tender though is 50% better than before.  He has been using heel insert as prescribed, he reports improvement in his pain with walking.   He has done well with manipulation in the past.  If indicated he would like manipulation today.  #2 his neck pain is nonradiating, located in his low cervical spine.  He has history of surgery on his neck.  The pain is worse with looking side to side.  He woke up with his pain.  He denies trauma.  If indicated, he would like manipulation today.  #3 he needs refill on metformin.  He takes 500 mg twice a day.  This helps him lose weight.  He has gained 4 pounds in the past 2 months.  He has appointment with weight loss surgeon coming up.  He has quit smoking 5 days ago.           The following portions of the patient's history were reviewed and updated as appropriate: allergies, current medications, past family history, past social history, past surgical history and problem list.    Review of Systems   Constitutional: Negative for chills, fever and unexpected weight loss.   HENT: Negative for congestion and sore throat.    Eyes: Negative for blurred vision and visual disturbance.   Respiratory: Negative for cough and wheezing.    Cardiovascular: Negative for chest pain and palpitations.   Gastrointestinal: Negative for abdominal pain and diarrhea.   Endocrine: Negative for cold intolerance and heat intolerance.   Genitourinary: Negative for dysuria.   Musculoskeletal: Positive for back pain and neck pain. Negative  for arthralgias and neck stiffness.   Neurological: Negative for dizziness, seizures and syncope.   Psychiatric/Behavioral: Negative for self-injury, suicidal ideas and depressed mood.       Objective   Physical Exam   Constitutional: He is oriented to person, place, and time. He appears well-developed and well-nourished.   HENT:   Head: Normocephalic and atraumatic.   Right Ear: External ear normal.   Left Ear: External ear normal.   Nose: Nose normal.   Mouth/Throat: Oropharynx is clear and moist.   Eyes: Conjunctivae and EOM are normal. Pupils are equal, round, and reactive to light.   Neck: Normal range of motion. Neck supple.   Cardiovascular: Normal rate, regular rhythm and normal heart sounds.   Pulmonary/Chest: Effort normal and breath sounds normal.   Abdominal: Soft. Bowel sounds are normal.   Musculoskeletal:   C 1 on 2 was rotated to the left.  T form 5 are neutral side bent left rotated right  L 1 was flexed side bent right and rotated to the right  Also his paraspinal musculature spasm on his right side associated with tender point over L5-S1.  Shoulders, elbows, and wrists with full range of motion and 5/5 strength bilaterally. DTRs symmetrical.    Neurological: He is alert and oriented to person, place, and time.   Skin: Skin is warm and dry.   Nursing note and vitals reviewed.        Assessment/Plan   Dipak was seen today for back pain.    Diagnoses and all orders for this visit:    Chronic bilateral low back pain with bilateral sciatica  Stretches and gentle core strengthening exercises discussed.  They were demonstrated and he was able to do them with me.  We are taking progress.  Manipulation today was beneficial.  BMI 40.0-44.9, adult (CMS/HCC)  -     metFORMIN (GLUCOPHAGE) 500 MG tablet; Take 1 tablet by mouth 2 (Two) Times a Day With Meals.    Weight loss counseling, encounter for  -     metFORMIN (GLUCOPHAGE) 500 MG tablet; Take 1 tablet by mouth 2 (Two) Times a Day With Meals.    Segmental  and somatic dysfunction of cervical region  OMT procedure, expected risks and benefits discussed with patient, who elects to proceed.  Written consent obtained. 3 techniques were used. Pt tolerated OMT well. No complications noted. Patient to do home stretches as shown in clinic today or instructed in after visit summary.    Segmental and somatic dysfunction of thoracic region  As above  Segmental and somatic dysfunction of lumbar region  Counterstrain to L5-S1 musculature is beneficial to reduce pain.  There is limitation to the manipulation we done in this area due to postsurgical changes.  Recommend follow-up in 2 to 4 weeks.    Of note, he quit smoking 5 days ago.             Patient's Body mass index is 41.35 kg/m². BMI is above normal parameters. Recommendations include: exercise counseling and nutrition counseling.

## 2019-11-14 ENCOUNTER — OFFICE VISIT (OUTPATIENT)
Dept: FAMILY MEDICINE CLINIC | Facility: CLINIC | Age: 54
End: 2019-11-14

## 2019-11-14 VITALS
RESPIRATION RATE: 24 BRPM | OXYGEN SATURATION: 98 % | HEART RATE: 101 BPM | SYSTOLIC BLOOD PRESSURE: 146 MMHG | TEMPERATURE: 99.2 F | BODY MASS INDEX: 42.12 KG/M2 | HEIGHT: 70 IN | DIASTOLIC BLOOD PRESSURE: 86 MMHG | WEIGHT: 294.2 LBS

## 2019-11-14 DIAGNOSIS — Z87.891 FORMER SMOKER: ICD-10-CM

## 2019-11-14 DIAGNOSIS — Z71.3 WEIGHT LOSS COUNSELING, ENCOUNTER FOR: Primary | ICD-10-CM

## 2019-11-14 PROCEDURE — 99213 OFFICE O/P EST LOW 20 MIN: CPT | Performed by: FAMILY MEDICINE

## 2019-11-14 NOTE — PROGRESS NOTES
Subjective   Dipak Marinelli is a 54 y.o. male.   Pt presents today with CC of Weight Check      History of Present Illness   1.  Patient is here for his final weight check.  He has gained 6 pounds since his last visit.  He has been trying to follow the diet, he admits that because of back pain, his exercise tolerance is low.  He has a follow-up appointment with the bariatric surgeon.  He is still wanting to move forward with the surgery.  He is otherwise having no concerns today.         The following portions of the patient's history were reviewed and updated as appropriate: allergies, current medications, past family history, past social history, past surgical history and problem list.    Review of Systems   Constitutional: Positive for unexpected weight gain. Negative for chills, fever and unexpected weight loss.   HENT: Negative for congestion and sore throat.    Eyes: Negative for blurred vision and visual disturbance.   Respiratory: Negative for cough and wheezing.    Cardiovascular: Negative for chest pain and palpitations.   Gastrointestinal: Negative for abdominal pain and diarrhea.   Endocrine: Negative for cold intolerance and heat intolerance.   Genitourinary: Negative for dysuria.   Musculoskeletal: Negative for arthralgias and neck stiffness.   Neurological: Negative for dizziness, seizures and syncope.   Psychiatric/Behavioral: Negative for self-injury, suicidal ideas and depressed mood.       Objective   Physical Exam   Constitutional: He is oriented to person, place, and time. He appears well-developed and well-nourished.   HENT:   Head: Normocephalic and atraumatic.   Eyes: Conjunctivae and EOM are normal. Pupils are equal, round, and reactive to light.   Neck: Normal range of motion. Neck supple.   Cardiovascular: Normal rate, regular rhythm and normal heart sounds.   Pulmonary/Chest: Effort normal and breath sounds normal.   Abdominal: Soft. Bowel sounds are normal.   Neurological: He is alert and  oriented to person, place, and time.   Skin: Skin is warm and dry.   Psychiatric: He has a normal mood and affect. His behavior is normal.   Nursing note and vitals reviewed.        Assessment/Plan   Dipak was seen today for weight check.    Diagnoses and all orders for this visit:    Weight loss counseling, encounter for  We discussed diet, and exercise as tolerated.  His diet seems to be his problem.  We discussed that after his bypass surgery, if he continues to have a poor diet, the surgery may fail.  He feels as though quitting smoking recently is contributing to his weight gain.  This is something he is going to need to get past in my opinion.  He may not return to smoking.  He is agreeable to this.  We reviewed a good weight loss diet that would be safe for him.      Former smoker  He reports that he has not smoked in weeks.                 Patient's Body mass index is 42.21 kg/m². BMI is above normal parameters. Recommendations include: exercise counseling, nutrition counseling and referral to a weight management program.

## 2019-12-11 ENCOUNTER — HOSPITAL ENCOUNTER (OUTPATIENT)
Dept: GENERAL RADIOLOGY | Facility: HOSPITAL | Age: 54
Discharge: HOME | End: 2019-12-11
Admitting: FAMILY MEDICINE

## 2019-12-11 ENCOUNTER — OFFICE VISIT (OUTPATIENT)
Dept: FAMILY MEDICINE CLINIC | Facility: CLINIC | Age: 54
End: 2019-12-11

## 2019-12-11 VITALS
WEIGHT: 296.8 LBS | DIASTOLIC BLOOD PRESSURE: 86 MMHG | OXYGEN SATURATION: 98 % | TEMPERATURE: 98.2 F | BODY MASS INDEX: 42.49 KG/M2 | SYSTOLIC BLOOD PRESSURE: 140 MMHG | HEIGHT: 70 IN | HEART RATE: 88 BPM

## 2019-12-11 DIAGNOSIS — M25.561 LATERAL KNEE PAIN, RIGHT: ICD-10-CM

## 2019-12-11 DIAGNOSIS — M76.31 IT BAND SYNDROME, RIGHT: ICD-10-CM

## 2019-12-11 DIAGNOSIS — M70.50 PES ANSERINE BURSITIS: ICD-10-CM

## 2019-12-11 DIAGNOSIS — Z96.652 TOTAL KNEE REPLACEMENT STATUS, LEFT: ICD-10-CM

## 2019-12-11 DIAGNOSIS — M25.562 LEFT MEDIAL KNEE PAIN: Primary | ICD-10-CM

## 2019-12-11 PROCEDURE — 99214 OFFICE O/P EST MOD 30 MIN: CPT | Performed by: FAMILY MEDICINE

## 2019-12-11 PROCEDURE — 73562 X-RAY EXAM OF KNEE 3: CPT | Performed by: RADIOLOGY

## 2019-12-11 PROCEDURE — 73560 X-RAY EXAM OF KNEE 1 OR 2: CPT

## 2019-12-11 NOTE — PROGRESS NOTES
Subjective   Dipak Marinelli is a 54 y.o. male.   Pt presents today with CC of Knee Pain (right knee)      History of Present Illness   1.  Patient complains of acute right knee pain.  The pain is located laterally, tends to get worse with flexion at approximately 90 to 110 degrees.  It was actually worse last week, there has never been a significant amount of swelling.  No trauma, he has not injured his knee that he knows of.  He has been stretching his hamstrings and this seems to have helped some.  #2 he complains of left chronic knee pain.  It is generalized, he is status post TKA.  This was years ago.  He occasionally has pain and swelling on his proximal tibia on the medial side.  He reports that very distinctive area of raised slightly erythematous tender tissue below his knee joint at the location of the past anserine bursa.  When this swells up, which is approximately annually, he reports that it is very uncomfortable.          The following portions of the patient's history were reviewed and updated as appropriate: allergies, current medications, past family history, past medical history, past social history, past surgical history and problem list.    Review of Systems   Constitutional: Negative for chills, fever and unexpected weight loss.   HENT: Negative for congestion and sore throat.    Eyes: Negative for blurred vision and visual disturbance.   Respiratory: Negative for cough and wheezing.    Cardiovascular: Negative for chest pain and palpitations.   Gastrointestinal: Negative for abdominal pain and diarrhea.   Genitourinary: Negative for dysuria.   Musculoskeletal: Negative for arthralgias and neck stiffness.   Neurological: Negative for dizziness, seizures and syncope.   Psychiatric/Behavioral: Negative for self-injury, suicidal ideas and depressed mood.       Objective   Physical Exam   Constitutional: He appears well-developed and well-nourished.   HENT:   Head: Normocephalic and atraumatic.    Eyes: Conjunctivae are normal.   Cardiovascular: Normal rate and regular rhythm.   Musculoskeletal:   Left knee: Large sagittal surgical scar from TKA.  His knee appears normal grossly.  Has near full range of motion, incomplete in flexion as is expected with a knee replacement.  He has a tender area over his peds anserine bursa, no erythematous swelling as has been described in the past, he states that it is , but it is not swollen like sometimes it does.  Otherwise a normal knee exam.  Right knee: Initially with range of motion he had tenderness with flexion at 90 to 110 degrees.  When his hamstrings relaxed, his knee moved painlessly through this range of motion.  Negative Cindy, negative Lockman, negative lateral and medial translation, patella is not ballotable.  IT band is tight, has lateral hamstring and slight spasm.   Nursing note and vitals reviewed.        Assessment/Plan   Dipak was seen today for knee pain.    Diagnoses and all orders for this visit:    Left medial knee pain  He has chronic pain in his left knee, much of this is from his knee replacement, is mild.  What he describes is pain over his peds anserine bursa.  I recommended bracing for compression, ice, and topical NSAID as needed.  No need for imaging at this time in my opinion.  It is my medical opinion that he would benefit from a knee brace with hinges to maintain alignment so that he may remain ambulatory.  Lateral knee pain, right  -     XR Knee 3 View Right  I believe this represents functional pain from IT band syndrome and perhaps tight hamstrings to some degree.  This also may represent osteoarthritis, or meniscus tear.  We will get an x-ray, including standing view as he has had a knee replacement on the other side.  We will get him a brace.  It is my medical opinion that he would benefit from a knee brace to help him maintain alignment so that he may remain ambulatory.  It band syndrome, right  As above  Pes  anserine bursitis  As above  Total knee replacement status, left  As above                   Patient's Body mass index is 42.59 kg/m². BMI is above normal parameters. Recommendations include: exercise counseling and nutrition counseling.   no dyspnea/no cough/no wheezing

## 2019-12-12 ENCOUNTER — TELEPHONE (OUTPATIENT)
Dept: FAMILY MEDICINE CLINIC | Facility: CLINIC | Age: 54
End: 2019-12-12

## 2019-12-12 NOTE — TELEPHONE ENCOUNTER
----- Message from Pilo Lock DO sent at 12/12/2019 10:27 AM EST -----  Your x-ray returned normal.  I recommend stretching as discussed, your braces should be in Friday or early next week, call the office and see if they are here before coming.      Left a message to return call.    Left a second message to return call.    Left a message to return call.      Letter mailed with the above information.

## 2019-12-13 ENCOUNTER — TELEPHONE (OUTPATIENT)
Dept: FAMILY MEDICINE CLINIC | Facility: CLINIC | Age: 54
End: 2019-12-13

## 2019-12-13 DIAGNOSIS — M25.561 LATERAL KNEE PAIN, RIGHT: Primary | ICD-10-CM

## 2019-12-13 DIAGNOSIS — M25.562 LEFT MEDIAL KNEE PAIN: ICD-10-CM

## 2019-12-13 NOTE — TELEPHONE ENCOUNTER
Wife called requesting an anti-inflammatory or something to help him ,she reports his right knee is so swollen it is even hard to walk today,awaiting braces.

## 2019-12-13 NOTE — TELEPHONE ENCOUNTER
Diclofenac sent.  He may take this up to twice a day.  Recommend ice and elevation over the weekend.  Ice 15 minutes on, 15 minutes off as needed.      Wife notified & verbalized understanding.

## 2019-12-13 NOTE — TELEPHONE ENCOUNTER
Diclofenac sent.  He may take this up to twice a day.  Recommend ice and elevation over the weekend.  Ice 15 minutes on, 15 minutes off as needed.

## 2019-12-20 DIAGNOSIS — Z72.0 TOBACCO USE: Primary | ICD-10-CM

## 2019-12-26 ENCOUNTER — TELEPHONE (OUTPATIENT)
Dept: FAMILY MEDICINE CLINIC | Facility: CLINIC | Age: 54
End: 2019-12-26

## 2020-01-01 NOTE — ADDENDUM NOTE
Addended by: PING KILGORE on: 10/29/2019 11:26 AM     Modules accepted: Orders     red reflex deferred/Acceptable eye movement

## 2020-01-14 ENCOUNTER — OFFICE VISIT (OUTPATIENT)
Dept: FAMILY MEDICINE CLINIC | Facility: CLINIC | Age: 55
End: 2020-01-14

## 2020-01-14 VITALS
HEIGHT: 70 IN | TEMPERATURE: 98.3 F | WEIGHT: 296.8 LBS | BODY MASS INDEX: 42.49 KG/M2 | OXYGEN SATURATION: 97 % | DIASTOLIC BLOOD PRESSURE: 80 MMHG | SYSTOLIC BLOOD PRESSURE: 138 MMHG | HEART RATE: 116 BPM

## 2020-01-14 DIAGNOSIS — M25.461 KNEE EFFUSION, RIGHT: Primary | ICD-10-CM

## 2020-01-14 DIAGNOSIS — Z71.3 WEIGHT LOSS COUNSELING, ENCOUNTER FOR: ICD-10-CM

## 2020-01-14 DIAGNOSIS — J41.0 SIMPLE CHRONIC BRONCHITIS (HCC): ICD-10-CM

## 2020-01-14 DIAGNOSIS — M25.561 LATERAL KNEE PAIN, RIGHT: ICD-10-CM

## 2020-01-14 DIAGNOSIS — K21.00 GERD WITH ESOPHAGITIS: ICD-10-CM

## 2020-01-14 DIAGNOSIS — Z87.891 DISCONTINUED SMOKING: ICD-10-CM

## 2020-01-14 DIAGNOSIS — F33.41 RECURRENT MAJOR DEPRESSIVE DISORDER, IN PARTIAL REMISSION (HCC): ICD-10-CM

## 2020-01-14 DIAGNOSIS — E78.2 MIXED HYPERLIPIDEMIA: ICD-10-CM

## 2020-01-14 DIAGNOSIS — I10 ESSENTIAL HYPERTENSION: ICD-10-CM

## 2020-01-14 DIAGNOSIS — I25.10 CORONARY ARTERY DISEASE INVOLVING NATIVE CORONARY ARTERY OF NATIVE HEART WITHOUT ANGINA PECTORIS: ICD-10-CM

## 2020-01-14 DIAGNOSIS — Z86.79 HISTORY OF ANGINA PECTORIS: ICD-10-CM

## 2020-01-14 DIAGNOSIS — M25.562 LEFT MEDIAL KNEE PAIN: ICD-10-CM

## 2020-01-14 PROCEDURE — 99214 OFFICE O/P EST MOD 30 MIN: CPT | Performed by: FAMILY MEDICINE

## 2020-01-14 RX ORDER — LISINOPRIL 20 MG/1
20 TABLET ORAL DAILY
Qty: 90 TABLET | Refills: 0 | Status: SHIPPED | OUTPATIENT
Start: 2020-01-14 | End: 2020-04-16 | Stop reason: SDUPTHER

## 2020-01-14 RX ORDER — ALBUTEROL SULFATE 2.5 MG/3ML
2.5 SOLUTION RESPIRATORY (INHALATION) EVERY 4 HOURS PRN
Qty: 30 VIAL | Refills: 2 | Status: SHIPPED | OUTPATIENT
Start: 2020-01-14 | End: 2021-03-02 | Stop reason: SDUPTHER

## 2020-01-14 RX ORDER — ROSUVASTATIN CALCIUM 10 MG/1
10 TABLET, COATED ORAL DAILY
Qty: 90 TABLET | Refills: 0 | Status: SHIPPED | OUTPATIENT
Start: 2020-01-14 | End: 2020-04-16 | Stop reason: SDUPTHER

## 2020-01-14 RX ORDER — AMLODIPINE BESYLATE 5 MG/1
5 TABLET ORAL DAILY
Qty: 90 TABLET | Refills: 0 | Status: SHIPPED | OUTPATIENT
Start: 2020-01-14 | End: 2020-04-16 | Stop reason: SDUPTHER

## 2020-01-14 RX ORDER — RABEPRAZOLE SODIUM 20 MG/1
20 TABLET, DELAYED RELEASE ORAL DAILY
Qty: 90 TABLET | Refills: 0 | Status: SHIPPED | OUTPATIENT
Start: 2020-01-14 | End: 2020-04-16

## 2020-01-14 RX ORDER — HYDROGEN PEROXIDE 2.65 ML/100ML
81 LIQUID ORAL; TOPICAL DAILY
Qty: 90 TABLET | Refills: 1 | Status: SHIPPED | OUTPATIENT
Start: 2020-01-14 | End: 2020-06-27 | Stop reason: HOSPADM

## 2020-01-14 RX ORDER — ALBUTEROL SULFATE 90 UG/1
1 AEROSOL, METERED RESPIRATORY (INHALATION) EVERY 4 HOURS PRN
Qty: 1 INHALER | Refills: 2 | Status: SHIPPED | OUTPATIENT
Start: 2020-01-14 | End: 2020-04-16 | Stop reason: SDUPTHER

## 2020-01-14 RX ORDER — METOPROLOL SUCCINATE 25 MG/1
25 TABLET, EXTENDED RELEASE ORAL DAILY
Qty: 90 TABLET | Refills: 0 | Status: SHIPPED | OUTPATIENT
Start: 2020-01-14 | End: 2020-04-16 | Stop reason: SDUPTHER

## 2020-01-14 RX ORDER — ISOSORBIDE MONONITRATE 30 MG/1
30 TABLET, EXTENDED RELEASE ORAL DAILY
Qty: 90 TABLET | Refills: 0 | Status: SHIPPED | OUTPATIENT
Start: 2020-01-14 | End: 2020-04-16 | Stop reason: SDUPTHER

## 2020-01-14 RX ORDER — LORATADINE 10 MG/1
10 TABLET ORAL DAILY
Qty: 90 TABLET | Refills: 2 | Status: SHIPPED | OUTPATIENT
Start: 2020-01-14 | End: 2021-03-02

## 2020-01-14 RX ORDER — RANOLAZINE 500 MG/1
500 TABLET, EXTENDED RELEASE ORAL 2 TIMES DAILY
Qty: 180 TABLET | Refills: 0 | Status: SHIPPED | OUTPATIENT
Start: 2020-01-14 | End: 2020-04-16 | Stop reason: SDUPTHER

## 2020-01-14 RX ORDER — CLOPIDOGREL BISULFATE 75 MG/1
75 TABLET ORAL DAILY
Qty: 90 TABLET | Refills: 0 | Status: SHIPPED | OUTPATIENT
Start: 2020-01-14 | End: 2020-04-16 | Stop reason: SDUPTHER

## 2020-01-14 RX ORDER — FLUOXETINE HYDROCHLORIDE 20 MG/1
60 CAPSULE ORAL DAILY
Qty: 270 CAPSULE | Refills: 1 | Status: SHIPPED | OUTPATIENT
Start: 2020-01-14 | End: 2021-03-02

## 2020-01-14 NOTE — PROGRESS NOTES
Subjective   Dipak Marinelli is a 54 y.o. male.   Pt presents today with CC of Knee Pain      History of Present Illness   1.  Patient is here today complaining of right knee pain.  He has had his left knee replaced, his right knee standing x-ray did not show any significant abnormalities for though he has had increased swelling, pain, and difficulty with ambulation.  His knee brace has not been helpful.  He reports that his pain has been unbearable.  He requests stronger pain medicine.  If indicated, he asks for analysis of the swelling in his knee.  #2 he has history of chronic bronchitis.  He takes albuterol, Claritin, and recently quit smoking.  He quit smoking so that he could have bariatric surgery.  #3 he has essential hypertension.  He takes Norvasc, Imdur, lisinopril, metoprolol, and has recently quit smoking.  #4 he has history of coronary artery disease.  He takes aspirin, Plavix, Ranexa, and Imdur.  He is agreeable to labs today.  #5 he has history of depression.  He takes Prozac 60 mg daily.  He has been stable on this medication.  He denies depression today.  #6 he has been trying to lose weight with diet, exercise, and is going for bariatric surgery in the future.  He takes metformin 500 mg twice a day.  He is agreeable to labs today.  #7 he has GERD with esophagitis seen on EGD.  He takes AcipHex 20 mg daily.  He has recently quit smoking.           The following portions of the patient's history were reviewed and updated as appropriate: allergies, current medications, past family history, past medical history, past social history, past surgical history and problem list.    Review of Systems   Constitutional: Negative for chills, fever and unexpected weight loss.   HENT: Negative for congestion and sore throat.    Eyes: Negative for blurred vision and visual disturbance.   Respiratory: Negative for cough and wheezing.    Cardiovascular: Negative for chest pain and palpitations.   Gastrointestinal:  Negative for abdominal pain and diarrhea.   Genitourinary: Negative for dysuria.   Musculoskeletal: Negative for arthralgias and neck stiffness.   Neurological: Negative for dizziness, seizures and syncope.   Psychiatric/Behavioral: Negative for self-injury, suicidal ideas and depressed mood.       Objective   Physical Exam   Constitutional: He appears well-developed and well-nourished.   HENT:   Head: Normocephalic and atraumatic.   Eyes: Conjunctivae are normal.   Neck: Normal range of motion. Neck supple.   Cardiovascular: Normal rate and regular rhythm.   Pulmonary/Chest: Effort normal and breath sounds normal.   Abdominal: Soft. Bowel sounds are normal. There is no tenderness.   Musculoskeletal:   His right knee appears normal.  Positive milking sign, the knee is not hot, and has full range of motion.  Negative Lockman, negative drawer tests, no internal derangement noted.  Some joint swelling is the only abnormality noted.   Nursing note and vitals reviewed.        Assessment/Plan   Dipak was seen today for knee pain.    Diagnoses and all orders for this visit:    Knee effusion, right    Recommend follow-up later this week for arthrocentesis.  I plan to analyze the fluid.    Simple chronic bronchitis (CMS/HCC)  -     albuterol (PROVENTIL) (2.5 MG/3ML) 0.083% nebulizer solution; Take 2.5 mg by nebulization Every 4 (Four) Hours As Needed for Wheezing.  -     loratadine (CLARITIN) 10 MG tablet; Take 1 tablet by mouth Daily.  -     VENTOLIN  (90 Base) MCG/ACT inhaler; Inhale 1 puff Every 4 (Four) Hours As Needed for Wheezing.  -     Nicotine & Metabolite, Quant    Essential hypertension  -     amLODIPine (NORVASC) 5 MG tablet; Take 1 tablet by mouth Daily.  -     isosorbide mononitrate (IMDUR) 30 MG 24 hr tablet; Take 1 tablet by mouth Daily.  -     lisinopril (PRINIVIL,ZESTRIL) 20 MG tablet; Take 1 tablet by mouth Daily.  -     metoprolol succinate XL (TOPROL-XL) 25 MG 24 hr tablet; Take 1 tablet by mouth  Daily.  -     rosuvastatin (CRESTOR) 10 MG tablet; Take 1 tablet by mouth Daily.  -     CBC Auto Differential  -     Comprehensive Metabolic Panel  -     Magnesium  -     Cancel: Lipid Panel  -     Nicotine & Metabolite, Quant    Coronary artery disease involving native coronary artery of native heart without angina pectoris  -     clopidogrel (PLAVIX) 75 MG tablet; Take 1 tablet by mouth Daily.  -     EQ ASPIRIN ADULT LOW DOSE 81 MG EC tablet; Take 1 tablet by mouth Daily.  -     isosorbide mononitrate (IMDUR) 30 MG 24 hr tablet; Take 1 tablet by mouth Daily.  -     ranolazine (RANEXA) 500 MG 12 hr tablet; Take 1 tablet by mouth 2 (Two) Times a Day.  -     CBC Auto Differential  -     Comprehensive Metabolic Panel  -     TSH  -     Hemoglobin A1c  -     Magnesium  -     Cancel: Lipid Panel  -     Nicotine & Metabolite, Quant    Lateral knee pain, right  -     diclofenac (VOLTAREN) 50 MG EC tablet; Take 1 tablet by mouth 2 (Two) Times a Day As Needed (joint pain).  -     Nicotine & Metabolite, Quant    Left medial knee pain  -     diclofenac (VOLTAREN) 50 MG EC tablet; Take 1 tablet by mouth 2 (Two) Times a Day As Needed (joint pain).  -     Nicotine & Metabolite, Quant    History of angina pectoris  -     EQ ASPIRIN ADULT LOW DOSE 81 MG EC tablet; Take 1 tablet by mouth Daily.  -     isosorbide mononitrate (IMDUR) 30 MG 24 hr tablet; Take 1 tablet by mouth Daily.  -     Nicotine & Metabolite, Quant    Recurrent major depressive disorder, in partial remission (CMS/HCC)  -     FLUoxetine (PROzac) 20 MG capsule; Take 3 capsules by mouth Daily.  -     Nicotine & Metabolite, Quant    BMI 40.0-44.9, adult (CMS/HCC)  -     metFORMIN (GLUCOPHAGE) 500 MG tablet; Take 1 tablet by mouth 2 (Two) Times a Day With Meals.  -     Nicotine & Metabolite, Quant    Weight loss counseling, encounter for  -     metFORMIN (GLUCOPHAGE) 500 MG tablet; Take 1 tablet by mouth 2 (Two) Times a Day With Meals.  -     Nicotine & Metabolite,  Quant    Mixed hyperlipidemia  -     rosuvastatin (CRESTOR) 10 MG tablet; Take 1 tablet by mouth Daily.  -     Cancel: Lipid Panel  -     Nicotine & Metabolite, Quant    GERD with esophagitis  -     RABEprazole (ACIPHEX) 20 MG EC tablet; Take 1 tablet by mouth Daily.  -     Nicotine & Metabolite, Quant    Discontinued smoking  -     Nicotine & Metabolite, Quant    Other orders  -     CBC & Differential                 Dipak Marinelli  reports that he quit smoking about 2 months ago. His smoking use included cigarettes. He smoked 0.50 packs per day. He has never used smokeless tobacco.. I have educated him on the risk of diseases from using tobacco products such as cancer, COPD and heart diease.   I spent 3  minutes counseling the patient.         Patient's Body mass index is 42.59 kg/m². BMI is above normal parameters. Recommendations include: exercise counseling and nutrition counseling.

## 2020-01-15 LAB
ALBUMIN SERPL-MCNC: 4.2 G/DL (ref 3.5–5.2)
ALBUMIN/GLOB SERPL: 1.6 G/DL
ALP SERPL-CCNC: 75 U/L (ref 39–117)
ALT SERPL-CCNC: 23 U/L (ref 1–41)
AST SERPL-CCNC: 15 U/L (ref 1–40)
BASOPHILS # BLD AUTO: 0.09 10*3/MM3 (ref 0–0.2)
BASOPHILS NFR BLD AUTO: 0.9 % (ref 0–1.5)
BILIRUB SERPL-MCNC: 0.4 MG/DL (ref 0.2–1.2)
BUN SERPL-MCNC: 12 MG/DL (ref 6–20)
BUN/CREAT SERPL: 11.1 (ref 7–25)
CALCIUM SERPL-MCNC: 9.2 MG/DL (ref 8.6–10.5)
CHLORIDE SERPL-SCNC: 100 MMOL/L (ref 98–107)
CO2 SERPL-SCNC: 22.6 MMOL/L (ref 22–29)
CREAT SERPL-MCNC: 1.08 MG/DL (ref 0.76–1.27)
EOSINOPHIL # BLD AUTO: 0.51 10*3/MM3 (ref 0–0.4)
EOSINOPHIL NFR BLD AUTO: 5.3 % (ref 0.3–6.2)
ERYTHROCYTE [DISTWIDTH] IN BLOOD BY AUTOMATED COUNT: 12.4 % (ref 12.3–15.4)
GLOBULIN SER CALC-MCNC: 2.6 GM/DL
GLUCOSE SERPL-MCNC: 117 MG/DL (ref 65–99)
HBA1C MFR BLD: 6.33 % (ref 4.8–5.6)
HCT VFR BLD AUTO: 42.6 % (ref 37.5–51)
HGB BLD-MCNC: 14.6 G/DL (ref 13–17.7)
IMM GRANULOCYTES # BLD AUTO: 0.03 10*3/MM3 (ref 0–0.05)
IMM GRANULOCYTES NFR BLD AUTO: 0.3 % (ref 0–0.5)
LYMPHOCYTES # BLD AUTO: 3.4 10*3/MM3 (ref 0.7–3.1)
LYMPHOCYTES NFR BLD AUTO: 35.5 % (ref 19.6–45.3)
MAGNESIUM SERPL-MCNC: 1.8 MG/DL (ref 1.6–2.6)
MCH RBC QN AUTO: 29.3 PG (ref 26.6–33)
MCHC RBC AUTO-ENTMCNC: 34.3 G/DL (ref 31.5–35.7)
MCV RBC AUTO: 85.5 FL (ref 79–97)
MONOCYTES # BLD AUTO: 0.84 10*3/MM3 (ref 0.1–0.9)
MONOCYTES NFR BLD AUTO: 8.8 % (ref 5–12)
NEUTROPHILS # BLD AUTO: 4.7 10*3/MM3 (ref 1.7–7)
NEUTROPHILS NFR BLD AUTO: 49.2 % (ref 42.7–76)
NRBC BLD AUTO-RTO: 0 /100 WBC (ref 0–0.2)
PLATELET # BLD AUTO: 299 10*3/MM3 (ref 140–450)
POTASSIUM SERPL-SCNC: 4.5 MMOL/L (ref 3.5–5.2)
PROT SERPL-MCNC: 6.8 G/DL (ref 6–8.5)
RBC # BLD AUTO: 4.98 10*6/MM3 (ref 4.14–5.8)
SODIUM SERPL-SCNC: 138 MMOL/L (ref 136–145)
TSH SERPL DL<=0.005 MIU/L-ACNC: 0.54 UIU/ML (ref 0.27–4.2)
WBC # BLD AUTO: 9.57 10*3/MM3 (ref 3.4–10.8)

## 2020-01-17 ENCOUNTER — OFFICE VISIT (OUTPATIENT)
Dept: FAMILY MEDICINE CLINIC | Facility: CLINIC | Age: 55
End: 2020-01-17

## 2020-01-17 VITALS
HEIGHT: 70 IN | HEART RATE: 87 BPM | TEMPERATURE: 97.8 F | DIASTOLIC BLOOD PRESSURE: 70 MMHG | SYSTOLIC BLOOD PRESSURE: 118 MMHG | OXYGEN SATURATION: 98 % | BODY MASS INDEX: 41.09 KG/M2 | WEIGHT: 287 LBS

## 2020-01-17 DIAGNOSIS — E78.2 MIXED HYPERLIPIDEMIA: ICD-10-CM

## 2020-01-17 DIAGNOSIS — M25.561 ACUTE PAIN OF RIGHT KNEE: Primary | ICD-10-CM

## 2020-01-17 PROCEDURE — 99213 OFFICE O/P EST LOW 20 MIN: CPT | Performed by: FAMILY MEDICINE

## 2020-01-17 PROCEDURE — 20610 DRAIN/INJ JOINT/BURSA W/O US: CPT | Performed by: FAMILY MEDICINE

## 2020-01-17 RX ORDER — TRAMADOL HYDROCHLORIDE 50 MG/1
50 TABLET ORAL EVERY 6 HOURS PRN
Qty: 60 TABLET | Refills: 0 | Status: CANCELLED | OUTPATIENT
Start: 2020-01-17

## 2020-01-17 RX ORDER — HYDROCODONE BITARTRATE AND ACETAMINOPHEN 5; 325 MG/1; MG/1
1 TABLET ORAL EVERY 6 HOURS PRN
Qty: 30 TABLET | Refills: 0 | Status: SHIPPED | OUTPATIENT
Start: 2020-01-17 | End: 2020-04-16 | Stop reason: SDUPTHER

## 2020-01-17 NOTE — PROGRESS NOTES
Subjective   Dipak Marinelli is a 54 y.o. male.   Pt presents today with CC of Knee Pain      History of Present Illness   Patient is here today to follow-up on right knee pain and swelling.  He has been wearing his brace with little benefit.  He denies fevers or chills.  He denies redness or heat coming from his knee.  He denies recent trauma.  He has had his left knee replaced.  Recent x-ray showed no acute findings, mild osteoarthritis.  If indicated he would like arthrocentesis today.       The following portions of the patient's history were reviewed and updated as appropriate: allergies, current medications, past family history, past medical history, past social history, past surgical history and problem list.    Review of Systems   Constitutional: Negative for chills, fever and unexpected weight loss.   HENT: Negative for congestion and sore throat.    Eyes: Negative for blurred vision and visual disturbance.   Respiratory: Negative for cough and wheezing.    Cardiovascular: Negative for chest pain and palpitations.   Gastrointestinal: Negative for abdominal pain and diarrhea.   Endocrine: Negative for cold intolerance and heat intolerance.   Genitourinary: Negative for dysuria.   Musculoskeletal: Negative for arthralgias and neck stiffness.   Neurological: Negative for dizziness, seizures and syncope.   Psychiatric/Behavioral: Negative for self-injury, suicidal ideas and depressed mood.       Objective   Physical Exam   Constitutional: He appears well-developed and well-nourished.   HENT:   Head: Normocephalic and atraumatic.   Right Ear: External ear normal.   Left Ear: External ear normal.   Nose: Nose normal.   Mouth/Throat: Oropharynx is clear and moist.   Eyes: Pupils are equal, round, and reactive to light. Conjunctivae and EOM are normal.   Cardiovascular: Normal rate, regular rhythm and normal heart sounds.   Pulmonary/Chest: Effort normal and breath sounds normal.   Musculoskeletal:   Exam relatively  normal with the exception of moderate knee effusion.  Consistent with 2 days ago.  No redness or heat.   Skin: No rash noted.   Psychiatric: He has a normal mood and affect. His behavior is normal.   Nursing note and vitals reviewed.        Assessment/Plan   Dipak was seen today for knee pain.    Diagnoses and all orders for this visit:    Acute pain of right knee  -     Ambulatory Referral to Orthopedic Surgery  -     MRI Knee Right Without Contrast; Future  -     HYDROcodone-acetaminophen (NORCO) 5-325 MG per tablet; Take 1 tablet by mouth Every 6 (Six) Hours As Needed for Moderate Pain .    Procedure, expected risks and benefits discussed with patient, who elects to proceed. Written consent signed and witnessed. Using sterile technique, The knee was cleaned with iodine, allowed to dry, then ethyl chloride spray used for anesthetic.  We initially tried lateral to his patella, the needle was placed short of his joint capsule before he asked for me to stop.  He could not tolerate the procedure at that time.  When given the choice to stop, or try the other side, he preferred to try the other side.  The knee was reprepped medial to patella.  Again, with sterile technique, and anesthetic with ethyl chloride spray, began placing the needle, he did not tolerate it and asked for us to stop.  No sample was obtained.  Procedure was aborted.  His knee was cleaned and bandaged.  Patient advised to call for fever, swelling, or significantly increased pain at injection site and avoid strenuous activity for one week.   We will order an MRI and refer to orthopedic surgery for their opinion.  Because he is still in pain, will provide a small supply of Norco for as needed use.  I recommend he not take this unless he needs it.  He is aware that we will not be treating his pain with narcotics long-term.  UDS obtained, controlled substance agreement signed.  Devendra was reviewed and was appropriate.    Mixed hyperlipidemia  -      Lipid Panel    Other orders  -     Cardiovascular Risk Assessment                 Dipak Marinelli  reports that he quit smoking about 2 months ago. His smoking use included cigarettes. He smoked 0.50 packs per day. He has never used smokeless tobacco.. I have educated him on the risk of diseases from using tobacco products such as cancer, COPD and heart diease.     I spent 3  minutes counseling the patient.         Patient's Body mass index is 41.18 kg/m². BMI is above normal parameters. Recommendations include: exercise counseling and nutrition counseling.

## 2020-01-18 LAB
CHOLEST SERPL-MCNC: 148 MG/DL (ref 0–200)
HDLC SERPL-MCNC: 41 MG/DL (ref 40–60)
INTERPRETATION: NORMAL
LDLC SERPL CALC-MCNC: 88 MG/DL (ref 0–100)
TRIGL SERPL-MCNC: 95 MG/DL (ref 0–150)
VLDLC SERPL CALC-MCNC: 19 MG/DL

## 2020-01-20 ENCOUNTER — TELEPHONE (OUTPATIENT)
Dept: FAMILY MEDICINE CLINIC | Facility: CLINIC | Age: 55
End: 2020-01-20

## 2020-01-20 LAB
COTININE SERPL-MCNC: 1.3 NG/ML
NICOTINE SERPL-MCNC: NORMAL NG/ML

## 2020-01-20 NOTE — TELEPHONE ENCOUNTER
----- Message from Pilo Lock, DO sent at 1/20/2020  9:00 AM EST -----  I reviewed your labs.  Everything looks good.  Your nicotine test has not come back yet, we are still waiting.    Left detailed message.

## 2020-01-22 DIAGNOSIS — F19.11 HX OF DRUG ABUSE (HCC): Primary | ICD-10-CM

## 2020-01-23 ENCOUNTER — TELEPHONE (OUTPATIENT)
Dept: FAMILY MEDICINE CLINIC | Facility: CLINIC | Age: 55
End: 2020-01-23

## 2020-01-23 NOTE — TELEPHONE ENCOUNTER
----- Message from Pilo Lock DO sent at 1/23/2020  8:01 AM EST -----  Your results returned.  Everything looks good.    Left message for patient.

## 2020-01-29 ENCOUNTER — TELEPHONE (OUTPATIENT)
Dept: FAMILY MEDICINE CLINIC | Facility: CLINIC | Age: 55
End: 2020-01-29

## 2020-01-29 ENCOUNTER — HOSPITAL ENCOUNTER (OUTPATIENT)
Dept: MRI IMAGING | Facility: HOSPITAL | Age: 55
Discharge: HOME | End: 2020-01-29
Admitting: FAMILY MEDICINE

## 2020-01-29 DIAGNOSIS — M25.561 ACUTE PAIN OF RIGHT KNEE: ICD-10-CM

## 2020-01-29 PROCEDURE — 73721 MRI JNT OF LWR EXTRE W/O DYE: CPT

## 2020-01-29 PROCEDURE — 73721 MRI JNT OF LWR EXTRE W/O DYE: CPT | Performed by: RADIOLOGY

## 2020-01-29 NOTE — TELEPHONE ENCOUNTER
Pt called back and I advised. Pt verbalized understanding. Has appt with ortho in February and will keep that appt.

## 2020-01-29 NOTE — TELEPHONE ENCOUNTER
----- Message from Pilo Lock DO sent at 1/29/2020  4:39 PM EST -----  The MRI confirms large effusion, also there is a tear in your medial meniscus.  Though surgery is not the only option that you have for this, I think you would be best served talking to an orthopedic surgeon about this.  Where would you like pressed to send you?        Left message for patient to call back

## 2020-02-24 DIAGNOSIS — R53.83 FATIGUE, UNSPECIFIED TYPE: Primary | ICD-10-CM

## 2020-02-24 DIAGNOSIS — R06.00 DYSPNEA, UNSPECIFIED TYPE: ICD-10-CM

## 2020-03-02 ENCOUNTER — CONSULT (OUTPATIENT)
Dept: BARIATRICS/WEIGHT MGMT | Facility: CLINIC | Age: 55
End: 2020-03-02

## 2020-03-02 VITALS
SYSTOLIC BLOOD PRESSURE: 128 MMHG | DIASTOLIC BLOOD PRESSURE: 80 MMHG | OXYGEN SATURATION: 97 % | WEIGHT: 296.5 LBS | BODY MASS INDEX: 42.45 KG/M2 | RESPIRATION RATE: 18 BRPM | HEART RATE: 88 BPM | TEMPERATURE: 96.4 F | HEIGHT: 70 IN

## 2020-03-02 DIAGNOSIS — M54.9 BACK PAIN, UNSPECIFIED BACK LOCATION, UNSPECIFIED BACK PAIN LATERALITY, UNSPECIFIED CHRONICITY: ICD-10-CM

## 2020-03-02 DIAGNOSIS — K21.9 GASTROESOPHAGEAL REFLUX DISEASE, ESOPHAGITIS PRESENCE NOT SPECIFIED: ICD-10-CM

## 2020-03-02 DIAGNOSIS — E66.01 OBESITY, CLASS III, BMI 40-49.9 (MORBID OBESITY) (HCC): ICD-10-CM

## 2020-03-02 DIAGNOSIS — R53.83 FATIGUE, UNSPECIFIED TYPE: Primary | ICD-10-CM

## 2020-03-02 DIAGNOSIS — I10 ESSENTIAL HYPERTENSION: ICD-10-CM

## 2020-03-02 DIAGNOSIS — M19.90 OSTEOARTHRITIS, UNSPECIFIED OSTEOARTHRITIS TYPE, UNSPECIFIED SITE: ICD-10-CM

## 2020-03-02 DIAGNOSIS — I25.10 CORONARY ARTERY DISEASE DUE TO LIPID RICH PLAQUE: ICD-10-CM

## 2020-03-02 DIAGNOSIS — I25.83 CORONARY ARTERY DISEASE DUE TO LIPID RICH PLAQUE: ICD-10-CM

## 2020-03-02 PROCEDURE — 99215 OFFICE O/P EST HI 40 MIN: CPT | Performed by: SURGERY

## 2020-03-02 RX ORDER — SCOLOPAMINE TRANSDERMAL SYSTEM 1 MG/1
1 PATCH, EXTENDED RELEASE TRANSDERMAL ONCE
Status: CANCELLED | OUTPATIENT
Start: 2020-03-02 | End: 2020-03-02

## 2020-03-02 RX ORDER — SODIUM CHLORIDE 9 MG/ML
150 INJECTION, SOLUTION INTRAVENOUS CONTINUOUS
Status: CANCELLED | OUTPATIENT
Start: 2020-03-02

## 2020-03-02 RX ORDER — ACETAMINOPHEN 500 MG
1000 TABLET ORAL ONCE
Status: CANCELLED | OUTPATIENT
Start: 2020-03-02 | End: 2020-03-02

## 2020-03-02 RX ORDER — PANTOPRAZOLE SODIUM 40 MG/10ML
40 INJECTION, POWDER, LYOPHILIZED, FOR SOLUTION INTRAVENOUS ONCE
Status: CANCELLED | OUTPATIENT
Start: 2020-03-02 | End: 2020-03-02

## 2020-03-02 RX ORDER — CHLORHEXIDINE GLUCONATE 0.12 MG/ML
15 RINSE ORAL
Status: CANCELLED | OUTPATIENT
Start: 2020-03-02 | End: 2020-03-02

## 2020-03-02 RX ORDER — GABAPENTIN 100 MG/1
600 CAPSULE ORAL ONCE
Status: CANCELLED | OUTPATIENT
Start: 2020-03-02 | End: 2020-03-02

## 2020-03-02 NOTE — H&P
"[facility] BARIATRIC SURGERY  [address]  [address]  [facility] [id number] [phone]      Patient  Name:  Dipak Marinelli  :  1965      Date of Visit: 3/2/2020      Chief Complaint:  weight gain; unable to maintain weight loss    History of Present Illness:  Dipak Marinelli is a 54 y.o. male who presents today for evaluation, education and consultation regarding weight loss surgery.     Patient has been overweight for many years, with numerous failed dietary/weight loss attempts.  He now has obesity related comorbidities of HLD, CAD, back pain, depression, back pain, GERD, HTN, OA and as such has decided to pursue weight loss surgery.    No personal or family hx of VTE or clotting d/o.  No liver, lung, or renal disease    \"He has CAD and has 9 heart stents.  Takes Plavix and ASA 81 mg daily, but off currently for having tooth extractions.  Reports his cardiologist would allow him to be off anti-platelets x 6 weeks post op and 1 weeks preop, since his stents were >2 years old.  His cardiologist is in [place].      +Asthma.  Has been hospitalized for asthma and received nebulizer.      Denies liver or kidney disease.  History of heavy alcohol use.  Has been sober x 7 years.    He lives with his wife in [place] and they have 7 biological children and 11 adopted children.    On metformin as a weight loss medicine.    +GERD: Sent by Dr. Chadwick Mobley re: HH and weight loss surgery.  Reviewed his pH monitoring, DeMeester score is 58 (significant reflux).    2018 EGD report from Dr. Mobley reviewed, \"Large hiatal hernia\", GE junction 40 cm.  Clinical photos from this EGD reviewed, but could not see a picture of the hernia.\"    Right knee injury since LOV.  Walking with a cane.    Mainly motivated to have surgery so I will fix the HH. Not a candidate for fundoplication with obese BMI.    He did not agree with me that smoking or his comorbidities contributed to his CAD.        Review of " "data:    RODDY: carisoprolol, hydrocodone  CBC: nl  CMP: nl  Psych: cleared, but suggested counseling for depression  CT scan re: left abdominal wall \"bulge\"--no ventral hernia.  Likely represents denervation of rectus.  EGD: Large HH per OSH EGD  HP neg  Cardiac clearance:  Low risk.  Ok to hold Plavix till 6 weeks post op, but start ASA as soon as possible.   Nicotine test normal.    PFTs: nl  Pulm clearance: cleared  EKG: nl  CXR: nl      Last tobacco: quit 3 months ago.  Reports not around secondhand smoke.  Last NSAIDs: 2 months  Last ASA: 2 weeks.  Last steroids: remote  Last hormones: n/a      Past Medical History:   Diagnosis Date   • Anxiety    • Asthma     Has been on oral steroids for asthma but it has been 10 years ago   • Back pain    • Cancer (CMS/HCC)    • GENTILE (chronic airflow obstruction) (CMS/HCC)    • Coronary artery disease     s/p 8 stents, most recently 2017.  Cardiologist is in [place].   • DDD (degenerative disc disease), cervical    • Depression    • Fatigue    • GERD (gastroesophageal reflux disease)     mostly controlled on Aciphex.  +HH.  Discovered on EGD by Dr. Mobley.    • Hypertension    • Insomnia    • Mixed hyperlipidemia 10/18/2018    on a statin, but mostly due to prevention per cardiologist   • Neck pain    • Osteoarthritis    • Spinal stenosis      Past Surgical History:   Procedure Laterality Date   • BACK SURGERY  1990    (total of 8 back surgeries) [facility]   • BRAVO PROCEDURE N/A 9/19/2018    Procedure: ESOPHAGOGASTRODUODENOSCOPY AND WILDER;  Surgeon: Chadwick Mobley MD;  [facility];  Service: Gastroenterology   • CARDIAC CATHETERIZATION     • CARDIOVASCULAR STRESS TEST     • CORONARY ANGIOPLASTY WITH STENT PLACEMENT     • ELBOW PROCEDURE     • ENDOSCOPY      2018 EGD report from Dr. Mobley reviewed, \"Large hiatal hernia\", GE junction 40 cm.  Clinical photos from this EGD reviewed, but could not see a picture of the hernia.   • HERNIA REPAIR      incisional from " anterior spinal fusion incision, repaired with mesh, performed in [place]   • LAPAROSCOPIC CHOLECYSTECTOMY      stones, in [place], [place] Dr. Arana   • MOUTH SURGERY     • NECK SURGERY     • OTHER SURGICAL HISTORY      elbow surgery   • SPINAL CORD STIMULATOR IMPLANT  2015    and removal   • TOTAL KNEE ARTHROPLASTY Left        Allergies   Allergen Reactions   • Penicillins Hives     Has tolerated Keflex without issue   • Tape      silk       Current Outpatient Medications:   •  albuterol (PROVENTIL) (2.5 MG/3ML) 0.083% nebulizer solution, Take 2.5 mg by nebulization Every 4 (Four) Hours As Needed for Wheezing., Disp: 30 vial, Rfl: 2  •  amLODIPine (NORVASC) 5 MG tablet, Take 1 tablet by mouth Daily., Disp: 90 tablet, Rfl: 0  •  clopidogrel (PLAVIX) 75 MG tablet, Take 1 tablet by mouth Daily., Disp: 90 tablet, Rfl: 0  •  EQ ASPIRIN ADULT LOW DOSE 81 MG EC tablet, Take 1 tablet by mouth Daily., Disp: 90 tablet, Rfl: 1  •  FLUoxetine (PROzac) 20 MG capsule, Take 3 capsules by mouth Daily., Disp: 270 capsule, Rfl: 1  •  isosorbide mononitrate (IMDUR) 30 MG 24 hr tablet, Take 1 tablet by mouth Daily., Disp: 90 tablet, Rfl: 0  •  lisinopril (PRINIVIL,ZESTRIL) 20 MG tablet, Take 1 tablet by mouth Daily., Disp: 90 tablet, Rfl: 0  •  loratadine (CLARITIN) 10 MG tablet, Take 1 tablet by mouth Daily., Disp: 90 tablet, Rfl: 2  •  metoprolol succinate XL (TOPROL-XL) 25 MG 24 hr tablet, Take 1 tablet by mouth Daily., Disp: 90 tablet, Rfl: 0  •  nitroglycerin (NITROSTAT) 0.4 MG SL tablet, Place 1 tablet under the tongue Every 5 (Five) Minutes As Needed for Chest Pain. Take no more than 3 doses in 15 minutes., Disp: 30 tablet, Rfl: 1  •  ondansetron ODT (ZOFRAN-ODT) 4 MG disintegrating tablet, Take 4 mg by mouth Every 8 (Eight) Hours As Needed for Nausea or Vomiting., Disp: , Rfl:   •  RABEprazole (ACIPHEX) 20 MG EC tablet, Take 1 tablet by mouth Daily., Disp: 90 tablet, Rfl: 0  •  ranolazine (RANEXA) 500 MG 12 hr tablet, Take 1 tablet  by mouth 2 (Two) Times a Day., Disp: 180 tablet, Rfl: 0  •  rosuvastatin (CRESTOR) 10 MG tablet, Take 1 tablet by mouth Daily., Disp: 90 tablet, Rfl: 0  •  VENTOLIN  (90 Base) MCG/ACT inhaler, Inhale 1 puff Every 4 (Four) Hours As Needed for Wheezing., Disp: 1 inhaler, Rfl: 2  •  diclofenac (VOLTAREN) 50 MG EC tablet, Take 1 tablet by mouth 2 (Two) Times a Day As Needed (joint pain)., Disp: 90 tablet, Rfl: 2  •  HYDROcodone-acetaminophen (NORCO) 5-325 MG per tablet, Take 1 tablet by mouth Every 6 (Six) Hours As Needed for Moderate Pain ., Disp: 30 tablet, Rfl: 0  •  metFORMIN (GLUCOPHAGE) 500 MG tablet, Take 1 tablet by mouth 2 (Two) Times a Day With Meals., Disp: 180 tablet, Rfl: 1    Social History     Socioeconomic History   • Marital status:      Spouse name: Not on file   • Number of children: Not on file   • Years of education: Not on file   • Highest education level: Not on file   Tobacco Use   • Smoking status: Former Smoker     Packs/day: 0.50     Types: Cigarettes     Last attempt to quit: 10/31/2019     Years since quittin.3   • Smokeless tobacco: Never Used   • Tobacco comment: using nicotine gum to quit, Is around secondhand smoke in house and car.   Substance and Sexual Activity   • Alcohol use: Yes     Frequency: 2-4 times a month     Drinks per session: 1 or 2     Comment: formerly alcoholic   • Drug use: Yes     Types: Marijuana     Comment: smokes marijuana 2-3x per month.     • Sexual activity: Defer   Social History Narrative    Stays home due to disability for back, heart disease.  Formerly in commercial constructions.  Lives wife and 1 child.      Family History   Problem Relation Age of Onset   • Heart disease Mother    • Heart disease Father    • Diabetes Father    • Depression Sister    • Lupus Sister        Review of Systems   Constitutional: Positive for fatigue and unexpected weight gain. Negative for chills, diaphoresis, fever and unexpected weight loss.   HENT:  Negative for congestion and facial swelling.    Eyes: Negative for blurred vision, double vision and discharge.   Respiratory: Negative for chest tightness, shortness of breath and stridor.    Cardiovascular: Negative for chest pain, palpitations and leg swelling.   Gastrointestinal: Negative for blood in stool.   Endocrine: Negative for polydipsia.   Genitourinary: Negative for hematuria.   Musculoskeletal: Positive for arthralgias.   Skin: Negative for color change.   Allergic/Immunologic: Negative for immunocompromised state.   Neurological: Negative for confusion.   Psychiatric/Behavioral: Negative for self-injury.       Physical Exam:  Vital Signs:  Weight: 134 kg (296 lb 8 oz)   Body mass index is 43.16 kg/m².  Temp: 96.4 °F (35.8 °C)   Heart Rate: 88   BP: 128/80     Physical Exam   Constitutional: He is oriented to person, place, and time. He appears well-developed and well-nourished.   HENT:   Head: Normocephalic and atraumatic.   Nose: Nose normal.   Eyes: Pupils are equal, round, and reactive to light. Conjunctivae and EOM are normal.   Neck: Normal range of motion. Neck supple. Carotid bruit is not present. No tracheal deviation present. No thyromegaly present.   Cardiovascular: Normal rate, regular rhythm and normal heart sounds.   Pulmonary/Chest: Effort normal and breath sounds normal. No respiratory distress.   Abdominal: Soft. He exhibits no distension. There is no hepatosplenomegaly. There is no tenderness.   Lap scars.  Lax upper abdomen.  Diastasis.  Left paramedian incision.   Musculoskeletal: Normal range of motion. He exhibits edema. He exhibits no deformity.   Neurological: He is alert and oriented to person, place, and time. No cranial nerve deficit. Coordination normal.   Ambulates with cane   Skin: Skin is warm and dry. No rash noted.   B/l peripheral edema, shiny skin   Psychiatric: He has a normal mood and affect. His behavior is normal. Judgment and thought content normal.   Vitals  reviewed.      Patient Active Problem List   Diagnosis   • Degenerative disc disease, lumbar   • History of lumbar surgery   • Chronic bilateral low back pain   • BMI 40.0-44.9, adult (CMS/HCC)   • Generalized abdominal pain   • Non-recurrent unilateral inguinal hernia without obstruction or gangrene   • History of angina pectoris   • Coronary artery disease involving native coronary artery of native heart without angina pectoris   • Mixed hyperlipidemia   • GERD with esophagitis   • Recurrent major depressive disorder, in partial remission (CMS/[facility])   • Dyspnea on exertion   • Total knee replacement status, left   • Seasonal allergies   • Tobacco abuse   • Weight loss counseling, encounter for   • Debility   • Segmental and somatic dysfunction of thoracic region   • Segmental and somatic dysfunction of lumbar region   • Segmental and somatic dysfunction of pelvic region   • Knee effusion, right       Assessment:    Dipak Marinelli is a 54 y.o. year old male with medically complicated obesity.    Weight loss surgery is deemed medically necessary given the following obesity related comorbidities including HLD, CAD, back pain, depression, back pain, GERD, HTN, OA with current Weight: 134 kg (296 lb 8 oz) and Body mass index is 43.16 kg/m²..    Patient is aware that surgery is a tool, and that weight loss is not guaranteed but only seen in the context of appropriate use, follow up and exercise.    The patient was present for an approximately a 2.5 hour discussion of the purpose of weight loss surgery, how WLS is a tool to assist in achieving weight loss goals, the most common complications and how best to avoid them, and the strategies for short and long term weight loss.  Ample opportunity to discuss questions was available both in group and during the time of individual examination.    I reviewed all available preop labs, Xrays, tests, clearances, etc and signed off on these in the record.  All of this in addition to  "the patient's unique history and exam has been taken into consideration in determining their appropriate candidacy for weight loss surgery.    Complications  of laparoscopic/possible robotic gastric sleeve were discussed. The patient is well aware of the potential complications of surgery that include but not limited to bleeding, infections, deep venous thrombosis, pulmonary embolism, pulmonary complications such as pneumonia, cardiac events, hernias, small bowel obstruction, damage to the spleen or other organs, bowel injury, disfiguring scars, failure to lose weight, need for additional surgery, conversion to an open procedure, and death. Patient is also aware of complications which apply in this particular procedure that can include but are not limited to a \"leak\" at the staple line which in some instances may require conversion to gastric bypass.    The patient is aware if a hiatal hernia is encountered, it likely will be repaired.  R/B/A Rx to hiatal hernia repair were discussed as outlined in our long consent form.  Briefly risks in addition to those for LSG include recurrent hernia, AB, dysphagia, esophageal injury, pneumothorax, injury to the vagus nerves, injury to the thoracic duct, aorta or vena cava.    Greater than 3 minutes was spent with the patient discussing avoiding all tobacco products and second hand smoke at least 2 weeks pre-operatively and 6 weeks post-operatively to minimize the risk of sleeve leak.  This included discussing the importance of avoiding even secondhand smoke as the risk of leak is increased.  Examples discussed:  I made it very clear that the patient understands they should avoid even riding in a car where someone has previously smoked in the last 2 weeks, living in a house where someone smokes (even if it's in a separate room/patio/attached garage, etc.) we discussed that they should not have a conversation with a group of people who are smoking even if it's outside.  They " can be around wood burning fires and barbecue.  I told them I do not know if marijuana has a same effects but my overall recommendation is to avoid it for 2 weeks prior in 6 weeks after surgery.  They also are aware that nicotine may also increase the risk of leak and I strongly encouraged him to avoid that as well for 2 weeks prior in 6 weeks after surgery.    Discussed the risks, benefits and alternative therapies at great length as outlined in our extensive consent forms, consent videos, and educational teaching process under the direction of the center's .    A copy of the patient's signed informed consent is on file.    Plan:  Laparoscopic sleeve gastrectomy + HHR at [facility].    I explained that my usual practice with cardiac stent patients has been to hold the ASA and/or plavix 1 wk prior and instead of the 6 wks afterwards (safest to hopefully avoid a leak), resume at 2 wks post op with PPI coverage.  So far, this has not resulted in any leaks to date, but the sample size is relatively small.  The idea is to balance the risk of stent occlusion with the risk of leak and there's no perfect solution.    She is agreeable.      R/B/A Rx discussed to postop anticoagulation incl but not limited to bleeding, drug reaction, venothromboembolic events, etc. and he declined.    MDM high:  Elective procedure with the following risk factors: morbid obesity, CAD, HTN  4+ chronic medical problems reviewed.      Sonya Barr MD

## 2020-03-02 NOTE — PROGRESS NOTES
"[facility] BARIATRIC SURGERY  [address]  [address]  [facility] [id number] [phone]      Patient  Name:  Dipak Marinelli  :  1965      Date of Visit: 3/2/2020      Chief Complaint:  weight gain; unable to maintain weight loss    History of Present Illness:  Dipak Marinelli is a 54 y.o. male who presents today for evaluation, education and consultation regarding weight loss surgery.     Patient has been overweight for many years, with numerous failed dietary/weight loss attempts.  He now has obesity related comorbidities of HLD, CAD, back pain, depression, back pain, GERD, HTN, OA and as such has decided to pursue weight loss surgery.    No personal or family hx of VTE or clotting d/o.  No liver, lung, or renal disease    \"He has CAD and has 9 heart stents.  Takes Plavix and ASA 81 mg daily, but off currently for having tooth extractions.  Reports his cardiologist would allow him to be off anti-platelets x 6 weeks post op and 1 weeks preop, since his stents were >2 years old.  His cardiologist is in [place].      +Asthma.  Has been hospitalized for asthma and received nebulizer.      Denies liver or kidney disease.  History of heavy alcohol use.  Has been sober x 7 years.    He lives with his wife in [place] and they have 7 biological children and 11 adopted children.    On metformin as a weight loss medicine.    +GERD: Sent by Dr. Chadwick Mobley re: HH and weight loss surgery.  Reviewed his pH monitoring, DeMeester score is 58 (significant reflux).    2018 EGD report from Dr. Mobley reviewed, \"Large hiatal hernia\", GE junction 40 cm.  Clinical photos from this EGD reviewed, but could not see a picture of the hernia.\"    Right knee injury since LOV.  Walking with a cane.    Mainly motivated to have surgery so I will fix the HH. Not a candidate for fundoplication with obese BMI.    He did not agree with me that smoking or his comorbidities contributed to his CAD.        Review of " "data:    RODDY: carisoprolol, hydrocodone  CBC: nl  CMP: nl  Psych: cleared, but suggested counseling for depression  CT scan re: left abdominal wall \"bulge\"--no ventral hernia.  Likely represents denervation of rectus.  EGD: Large HH per OSH EGD  HP neg  Cardiac clearance:  Low risk.  Ok to hold Plavix till 6 weeks post op, but start ASA as soon as possible.   Nicotine test normal.    PFTs: nl  Pulm clearance: cleared  EKG: nl  CXR: nl      Last tobacco: quit 3 months ago.  Reports not around secondhand smoke.  Last NSAIDs: 2 months  Last ASA: 2 weeks.  Last steroids: remote  Last hormones: n/a      Past Medical History:   Diagnosis Date   • Anxiety    • Asthma     Has been on oral steroids for asthma but it has been 10 years ago   • Back pain    • Cancer (CMS/HCC)    • GENTILE (chronic airflow obstruction) (CMS/HCC)    • Coronary artery disease     s/p 8 stents, most recently 2017.  Cardiologist is in [place].   • DDD (degenerative disc disease), cervical    • Depression    • Fatigue    • GERD (gastroesophageal reflux disease)     mostly controlled on Aciphex.  +HH.  Discovered on EGD by Dr. Mobley.    • Hypertension    • Insomnia    • Mixed hyperlipidemia 10/18/2018    on a statin, but mostly due to prevention per cardiologist   • Neck pain    • Osteoarthritis    • Spinal stenosis      Past Surgical History:   Procedure Laterality Date   • BACK SURGERY  1990    (total of 8 back surgeries) [facility]   • BRAVO PROCEDURE N/A 9/19/2018    Procedure: ESOPHAGOGASTRODUODENOSCOPY AND WILDER;  Surgeon: Chadwick Mobley MD;  Location: [facility];  Service: Gastroenterology   • CARDIAC CATHETERIZATION     • CARDIOVASCULAR STRESS TEST     • CORONARY ANGIOPLASTY WITH STENT PLACEMENT     • ELBOW PROCEDURE     • ENDOSCOPY      2018 EGD report from Dr. Mobley reviewed, \"Large hiatal hernia\", GE junction 40 cm.  Clinical photos from this EGD reviewed, but could not see a picture of the hernia.   • HERNIA REPAIR      incisional from " anterior spinal fusion incision, repaired with mesh, performed in [place]   • LAPAROSCOPIC CHOLECYSTECTOMY      stones, in [place], [place] Dr. Arana   • MOUTH SURGERY     • NECK SURGERY     • OTHER SURGICAL HISTORY      elbow surgery   • SPINAL CORD STIMULATOR IMPLANT  2015    and removal   • TOTAL KNEE ARTHROPLASTY Left        Allergies   Allergen Reactions   • Penicillins Hives     Has tolerated Keflex without issue   • Tape      silk       Current Outpatient Medications:   •  albuterol (PROVENTIL) (2.5 MG/3ML) 0.083% nebulizer solution, Take 2.5 mg by nebulization Every 4 (Four) Hours As Needed for Wheezing., Disp: 30 vial, Rfl: 2  •  amLODIPine (NORVASC) 5 MG tablet, Take 1 tablet by mouth Daily., Disp: 90 tablet, Rfl: 0  •  clopidogrel (PLAVIX) 75 MG tablet, Take 1 tablet by mouth Daily., Disp: 90 tablet, Rfl: 0  •  EQ ASPIRIN ADULT LOW DOSE 81 MG EC tablet, Take 1 tablet by mouth Daily., Disp: 90 tablet, Rfl: 1  •  FLUoxetine (PROzac) 20 MG capsule, Take 3 capsules by mouth Daily., Disp: 270 capsule, Rfl: 1  •  isosorbide mononitrate (IMDUR) 30 MG 24 hr tablet, Take 1 tablet by mouth Daily., Disp: 90 tablet, Rfl: 0  •  lisinopril (PRINIVIL,ZESTRIL) 20 MG tablet, Take 1 tablet by mouth Daily., Disp: 90 tablet, Rfl: 0  •  loratadine (CLARITIN) 10 MG tablet, Take 1 tablet by mouth Daily., Disp: 90 tablet, Rfl: 2  •  metoprolol succinate XL (TOPROL-XL) 25 MG 24 hr tablet, Take 1 tablet by mouth Daily., Disp: 90 tablet, Rfl: 0  •  nitroglycerin (NITROSTAT) 0.4 MG SL tablet, Place 1 tablet under the tongue Every 5 (Five) Minutes As Needed for Chest Pain. Take no more than 3 doses in 15 minutes., Disp: 30 tablet, Rfl: 1  •  ondansetron ODT (ZOFRAN-ODT) 4 MG disintegrating tablet, Take 4 mg by mouth Every 8 (Eight) Hours As Needed for Nausea or Vomiting., Disp: , Rfl:   •  RABEprazole (ACIPHEX) 20 MG EC tablet, Take 1 tablet by mouth Daily., Disp: 90 tablet, Rfl: 0  •  ranolazine (RANEXA) 500 MG 12 hr tablet, Take 1 tablet  by mouth 2 (Two) Times a Day., Disp: 180 tablet, Rfl: 0  •  rosuvastatin (CRESTOR) 10 MG tablet, Take 1 tablet by mouth Daily., Disp: 90 tablet, Rfl: 0  •  VENTOLIN  (90 Base) MCG/ACT inhaler, Inhale 1 puff Every 4 (Four) Hours As Needed for Wheezing., Disp: 1 inhaler, Rfl: 2  •  diclofenac (VOLTAREN) 50 MG EC tablet, Take 1 tablet by mouth 2 (Two) Times a Day As Needed (joint pain)., Disp: 90 tablet, Rfl: 2  •  HYDROcodone-acetaminophen (NORCO) 5-325 MG per tablet, Take 1 tablet by mouth Every 6 (Six) Hours As Needed for Moderate Pain ., Disp: 30 tablet, Rfl: 0  •  metFORMIN (GLUCOPHAGE) 500 MG tablet, Take 1 tablet by mouth 2 (Two) Times a Day With Meals., Disp: 180 tablet, Rfl: 1    Social History     Socioeconomic History   • Marital status:      Spouse name: Not on file   • Number of children: Not on file   • Years of education: Not on file   • Highest education level: Not on file   Tobacco Use   • Smoking status: Former Smoker     Packs/day: 0.50     Types: Cigarettes     Last attempt to quit: 10/31/2019     Years since quittin.3   • Smokeless tobacco: Never Used   • Tobacco comment: using nicotine gum to quit, Is around secondhand smoke in house and car.   Substance and Sexual Activity   • Alcohol use: Yes     Frequency: 2-4 times a month     Drinks per session: 1 or 2     Comment: formerly alcoholic   • Drug use: Yes     Types: Marijuana     Comment: smokes marijuana 2-3x per month.     • Sexual activity: Defer   Social History Narrative    Stays home due to disability for back, heart disease.  Formerly in commercial constructions.  Lives wife and 1 child.      Family History   Problem Relation Age of Onset   • Heart disease Mother    • Heart disease Father    • Diabetes Father    • Depression Sister    • Lupus Sister        Review of Systems   Constitutional: Positive for fatigue and unexpected weight gain. Negative for chills, diaphoresis, fever and unexpected weight loss.   HENT:  Negative for congestion and facial swelling.    Eyes: Negative for blurred vision, double vision and discharge.   Respiratory: Negative for chest tightness, shortness of breath and stridor.    Cardiovascular: Negative for chest pain, palpitations and leg swelling.   Gastrointestinal: Negative for blood in stool.   Endocrine: Negative for polydipsia.   Genitourinary: Negative for hematuria.   Musculoskeletal: Positive for arthralgias.   Skin: Negative for color change.   Allergic/Immunologic: Negative for immunocompromised state.   Neurological: Negative for confusion.   Psychiatric/Behavioral: Negative for self-injury.       Physical Exam:  Vital Signs:  Weight: 134 kg (296 lb 8 oz)   Body mass index is 43.16 kg/m².  Temp: 96.4 °F (35.8 °C)   Heart Rate: 88   BP: 128/80     Physical Exam   Constitutional: He is oriented to person, place, and time. He appears well-developed and well-nourished.   HENT:   Head: Normocephalic and atraumatic.   Nose: Nose normal.   Eyes: Pupils are equal, round, and reactive to light. Conjunctivae and EOM are normal.   Neck: Normal range of motion. Neck supple. Carotid bruit is not present. No tracheal deviation present. No thyromegaly present.   Cardiovascular: Normal rate, regular rhythm and normal heart sounds.   Pulmonary/Chest: Effort normal and breath sounds normal. No respiratory distress.   Abdominal: Soft. He exhibits no distension. There is no hepatosplenomegaly. There is no tenderness.   Lap scars.  Lax upper abdomen.  Diastasis.  Left paramedian incision.   Musculoskeletal: Normal range of motion. He exhibits edema. He exhibits no deformity.   Neurological: He is alert and oriented to person, place, and time. No cranial nerve deficit. Coordination normal.   Ambulates with cane   Skin: Skin is warm and dry. No rash noted.   B/l peripheral edema, shiny skin   Psychiatric: He has a normal mood and affect. His behavior is normal. Judgment and thought content normal.   Vitals  reviewed.      Patient Active Problem List   Diagnosis   • Degenerative disc disease, lumbar   • History of lumbar surgery   • Chronic bilateral low back pain   • BMI 40.0-44.9, adult (CMS/HCC)   • Generalized abdominal pain   • Non-recurrent unilateral inguinal hernia without obstruction or gangrene   • History of angina pectoris   • Coronary artery disease involving native coronary artery of native heart without angina pectoris   • Mixed hyperlipidemia   • GERD with esophagitis   • Recurrent major depressive disorder, in partial remission (CMS/[facility])   • Dyspnea on exertion   • Total knee replacement status, left   • Seasonal allergies   • Tobacco abuse   • Weight loss counseling, encounter for   • Debility   • Segmental and somatic dysfunction of thoracic region   • Segmental and somatic dysfunction of lumbar region   • Segmental and somatic dysfunction of pelvic region   • Knee effusion, right       Assessment:    Dipak Marinelli is a 54 y.o. year old male with medically complicated obesity.    Weight loss surgery is deemed medically necessary given the following obesity related comorbidities including HLD, CAD, back pain, depression, back pain, GERD, HTN, OA with current Weight: 134 kg (296 lb 8 oz) and Body mass index is 43.16 kg/m²..    Patient is aware that surgery is a tool, and that weight loss is not guaranteed but only seen in the context of appropriate use, follow up and exercise.    The patient was present for an approximately a 2.5 hour discussion of the purpose of weight loss surgery, how WLS is a tool to assist in achieving weight loss goals, the most common complications and how best to avoid them, and the strategies for short and long term weight loss.  Ample opportunity to discuss questions was available both in group and during the time of individual examination.    I reviewed all available preop labs, Xrays, tests, clearances, etc and signed off on these in the record.  All of this in addition to  "the patient's unique history and exam has been taken into consideration in determining their appropriate candidacy for weight loss surgery.    Complications  of laparoscopic/possible robotic gastric sleeve were discussed. The patient is well aware of the potential complications of surgery that include but not limited to bleeding, infections, deep venous thrombosis, pulmonary embolism, pulmonary complications such as pneumonia, cardiac events, hernias, small bowel obstruction, damage to the spleen or other organs, bowel injury, disfiguring scars, failure to lose weight, need for additional surgery, conversion to an open procedure, and death. Patient is also aware of complications which apply in this particular procedure that can include but are not limited to a \"leak\" at the staple line which in some instances may require conversion to gastric bypass.    The patient is aware if a hiatal hernia is encountered, it likely will be repaired.  R/B/A Rx to hiatal hernia repair were discussed as outlined in our long consent form.  Briefly risks in addition to those for LSG include recurrent hernia, AB, dysphagia, esophageal injury, pneumothorax, injury to the vagus nerves, injury to the thoracic duct, aorta or vena cava.    Greater than 3 minutes was spent with the patient discussing avoiding all tobacco products and second hand smoke at least 2 weeks pre-operatively and 6 weeks post-operatively to minimize the risk of sleeve leak.  This included discussing the importance of avoiding even secondhand smoke as the risk of leak is increased.  Examples discussed:  I made it very clear that the patient understands they should avoid even riding in a car where someone has previously smoked in the last 2 weeks, living in a house where someone smokes (even if it's in a separate room/patio/attached garage, etc.) we discussed that they should not have a conversation with a group of people who are smoking even if it's outside.  They " can be around wood burning fires and barbecue.  I told them I do not know if marijuana has a same effects but my overall recommendation is to avoid it for 2 weeks prior in 6 weeks after surgery.  They also are aware that nicotine may also increase the risk of leak and I strongly encouraged him to avoid that as well for 2 weeks prior in 6 weeks after surgery.    Discussed the risks, benefits and alternative therapies at great length as outlined in our extensive consent forms, consent videos, and educational teaching process under the direction of the center's .    A copy of the patient's signed informed consent is on file.    Plan:  Laparoscopic sleeve gastrectomy + HHR at [facility].    I explained that my usual practice with cardiac stent patients has been to hold the ASA and/or plavix 1 wk prior and instead of the 6 wks afterwards (safest to hopefully avoid a leak), resume at 2 wks post op with PPI coverage.  So far, this has not resulted in any leaks to date, but the sample size is relatively small.  The idea is to balance the risk of stent occlusion with the risk of leak and there's no perfect solution.    She is agreeable.      R/B/A Rx discussed to postop anticoagulation incl but not limited to bleeding, drug reaction, venothromboembolic events, etc. and he declined.    MDM high:  Elective procedure with the following risk factors: morbid obesity, CAD, HTN  4+ chronic medical problems reviewed.      Sonya Barr MD

## 2020-03-23 ENCOUNTER — APPOINTMENT (OUTPATIENT)
Dept: PREADMISSION TESTING | Facility: HOSPITAL | Age: 55
End: 2020-03-23

## 2020-04-15 DIAGNOSIS — M25.561 ACUTE PAIN OF RIGHT KNEE: ICD-10-CM

## 2020-04-15 RX ORDER — HYDROCODONE BITARTRATE AND ACETAMINOPHEN 5; 325 MG/1; MG/1
1 TABLET ORAL EVERY 6 HOURS PRN
Qty: 30 TABLET | Refills: 0 | Status: CANCELLED | OUTPATIENT
Start: 2020-04-15

## 2020-04-16 ENCOUNTER — OFFICE VISIT (OUTPATIENT)
Dept: FAMILY MEDICINE CLINIC | Facility: CLINIC | Age: 55
End: 2020-04-16

## 2020-04-16 DIAGNOSIS — J41.0 SIMPLE CHRONIC BRONCHITIS (HCC): ICD-10-CM

## 2020-04-16 DIAGNOSIS — E78.2 MIXED HYPERLIPIDEMIA: ICD-10-CM

## 2020-04-16 DIAGNOSIS — Z86.79 HISTORY OF ANGINA PECTORIS: ICD-10-CM

## 2020-04-16 DIAGNOSIS — M25.561 ACUTE PAIN OF RIGHT KNEE: ICD-10-CM

## 2020-04-16 DIAGNOSIS — K21.00 GERD WITH ESOPHAGITIS: ICD-10-CM

## 2020-04-16 DIAGNOSIS — I10 ESSENTIAL HYPERTENSION: ICD-10-CM

## 2020-04-16 DIAGNOSIS — I25.10 CORONARY ARTERY DISEASE INVOLVING NATIVE CORONARY ARTERY OF NATIVE HEART WITHOUT ANGINA PECTORIS: ICD-10-CM

## 2020-04-16 PROCEDURE — 99442 PR PHYS/QHP TELEPHONE EVALUATION 11-20 MIN: CPT | Performed by: FAMILY MEDICINE

## 2020-04-16 RX ORDER — PANTOPRAZOLE SODIUM 40 MG/1
40 TABLET, DELAYED RELEASE ORAL DAILY
Qty: 90 TABLET | Refills: 0 | Status: SHIPPED | OUTPATIENT
Start: 2020-04-16 | End: 2020-11-04

## 2020-04-16 RX ORDER — HYDROCODONE BITARTRATE AND ACETAMINOPHEN 5; 325 MG/1; MG/1
1 TABLET ORAL EVERY 6 HOURS PRN
Qty: 28 TABLET | Refills: 0 | Status: ON HOLD | OUTPATIENT
Start: 2020-04-16 | End: 2020-06-23

## 2020-04-16 RX ORDER — RANOLAZINE 500 MG/1
500 TABLET, EXTENDED RELEASE ORAL 2 TIMES DAILY
Qty: 180 TABLET | Refills: 0 | Status: SHIPPED | OUTPATIENT
Start: 2020-04-16 | End: 2021-03-02

## 2020-04-16 RX ORDER — CLOPIDOGREL BISULFATE 75 MG/1
75 TABLET ORAL DAILY
Qty: 90 TABLET | Refills: 0 | Status: SHIPPED | OUTPATIENT
Start: 2020-04-16 | End: 2020-06-27 | Stop reason: HOSPADM

## 2020-04-16 RX ORDER — LISINOPRIL 20 MG/1
20 TABLET ORAL DAILY
Qty: 90 TABLET | Refills: 0 | Status: SHIPPED | OUTPATIENT
Start: 2020-04-16 | End: 2021-03-02

## 2020-04-16 RX ORDER — METOPROLOL SUCCINATE 25 MG/1
25 TABLET, EXTENDED RELEASE ORAL DAILY
Qty: 90 TABLET | Refills: 0 | Status: SHIPPED | OUTPATIENT
Start: 2020-04-16 | End: 2021-03-02

## 2020-04-16 RX ORDER — ISOSORBIDE MONONITRATE 30 MG/1
30 TABLET, EXTENDED RELEASE ORAL DAILY
Qty: 90 TABLET | Refills: 0 | Status: SHIPPED | OUTPATIENT
Start: 2020-04-16 | End: 2021-03-02

## 2020-04-16 RX ORDER — AMLODIPINE BESYLATE 5 MG/1
5 TABLET ORAL DAILY
Qty: 90 TABLET | Refills: 0 | Status: SHIPPED | OUTPATIENT
Start: 2020-04-16 | End: 2021-03-02

## 2020-04-16 RX ORDER — ALBUTEROL SULFATE 90 UG/1
1 AEROSOL, METERED RESPIRATORY (INHALATION) EVERY 4 HOURS PRN
Qty: 1 INHALER | Refills: 2 | Status: SHIPPED | OUTPATIENT
Start: 2020-04-16 | End: 2021-03-02 | Stop reason: SDUPTHER

## 2020-04-16 RX ORDER — ROSUVASTATIN CALCIUM 10 MG/1
10 TABLET, COATED ORAL DAILY
Qty: 90 TABLET | Refills: 0 | Status: SHIPPED | OUTPATIENT
Start: 2020-04-16 | End: 2020-09-14

## 2020-04-16 NOTE — PROGRESS NOTES
Subjective   Dipak Marinelli is a 54 y.o. male.   Pt presents today with CC of Knee Pain (right; out of medication)     Patient consents for telephone encounter?  Yes    History of Present Illness   1.  Patient called complaining of continued right knee pain.  He had an appointment with orthopedics that has been rescheduled a couple of times since the COVID-19 outbreak.  He has not been able to get in with orthopedics.  He is unable to take daily NSAIDs because of coronary artery disease as well as uncontrolled acid reflux.  He utilizes ice, rest when able, and he wears bilateral knee braces.  Despite this, he has continued to have significant right knee pain.  He was prescribed #28 hydrocodone with acetaminophen in January.  He took them sparingly, and they lasted approximately 90 days.  He would like another refill until he can get in with orthopedics.  #2 he has coronary artery disease.  He takes Plavix, Ranexa, and Imdur.  He denies chest pain and would like refills.  #3 he has hypertension.  He takes lisinopril 20 mg, amlodipine 5 mg, metoprolol succinate XL 25 mg daily, and Imdur 30 mg daily.  He reports that when he checks his blood pressure at home, it has been normal.  #4 he has hyperlipidemia associated with coronary artery disease and hypertension.  He takes Crestor 10 mg daily.  He does well on this medication.  #5 he has GERD with esophagitis.  He was taking AcipHex until recently.  His insurance is no longer covering this.  He would like to be switched to something else.  He reports that Protonix has been somewhat helpful in the past.  #6 he has simple chronic bronchitis.  He takes albuterol inhalers as needed for wheezing.  He reportedly quit smoking in December.               The following portions of the patient's history were reviewed and updated as appropriate: allergies, current medications, past family history, past medical history, past social history, past surgical history and problem  list.    Review of Systems   Constitutional: Negative for chills, fever and unexpected weight loss.   HENT: Negative for congestion and sore throat.    Eyes: Negative for blurred vision and visual disturbance.   Respiratory: Negative for cough and wheezing.    Cardiovascular: Negative for chest pain and palpitations.   Gastrointestinal: Negative for abdominal pain and diarrhea.   Endocrine: Negative for cold intolerance and heat intolerance.   Genitourinary: Negative for dysuria.   Musculoskeletal: Negative for arthralgias and neck stiffness.   Neurological: Negative for dizziness, seizures and syncope.   Psychiatric/Behavioral: Negative for self-injury, suicidal ideas and depressed mood.     Assessment/Plan   Dipak was seen today for knee pain and heartburn.    Diagnoses and all orders for this visit:    Coronary artery disease involving native coronary artery of native heart without angina pectoris  -     clopidogrel (PLAVIX) 75 MG tablet; Take 1 tablet by mouth Daily.  -     ranolazine (RANEXA) 500 MG 12 hr tablet; Take 1 tablet by mouth 2 (Two) Times a Day.  -     isosorbide mononitrate (Imdur) 30 MG 24 hr tablet; Take 1 tablet by mouth Daily.    History of angina pectoris  -     isosorbide mononitrate (Imdur) 30 MG 24 hr tablet; Take 1 tablet by mouth Daily.    Essential hypertension  -     isosorbide mononitrate (Imdur) 30 MG 24 hr tablet; Take 1 tablet by mouth Daily.  -     metoprolol succinate XL (TOPROL-XL) 25 MG 24 hr tablet; Take 1 tablet by mouth Daily.  -     lisinopril (PRINIVIL,ZESTRIL) 20 MG tablet; Take 1 tablet by mouth Daily.  -     amLODIPine (NORVASC) 5 MG tablet; Take 1 tablet by mouth Daily.  -     rosuvastatin (CRESTOR) 10 MG tablet; Take 1 tablet by mouth Daily.    Mixed hyperlipidemia  -     rosuvastatin (CRESTOR) 10 MG tablet; Take 1 tablet by mouth Daily.    GERD with esophagitis  -     pantoprazole (PROTONIX) 40 MG EC tablet; Take 1 tablet by mouth Daily.    Simple chronic bronchitis  (CMS/[facility])  -     VENTOLIN  (90 Base) MCG/ACT inhaler; Inhale 1 puff Every 4 (Four) Hours As Needed for Wheezing.    Acute pain of right knee  -     HYDROcodone-acetaminophen (Norco) 5-325 MG per tablet; Take 1 tablet by mouth Every 6 (Six) Hours As Needed for Moderate Pain  for up to 90 days.  He is already utilizing all conservative treatment options.  He is still having breakthrough significant pain at times.  He is taking approximately 1 tablet every 3 days.  Until he can get in with orthopedics, this is reasonable.  His most previous urine drug screens have been normal.  His Devendra was reviewed request #[id number].  Was appropriate.  I discussed with him that chronic use of narcotics is not an adequate treatment for chronic pain.  If orthopedics is unable to help him, we will consider referral to pain clinic for their opinion.  He is agreeable to the plan.    12 minutes spent on the phone discussing this with patient           Patient's There is no height or weight on file to calculate BMI. BMI is above normal parameters. Recommendations include: exercise counseling and nutrition counseling.

## 2020-06-11 ENCOUNTER — TELEMEDICINE (OUTPATIENT)
Dept: BARIATRICS/WEIGHT MGMT | Facility: CLINIC | Age: 55
End: 2020-06-11

## 2020-06-11 DIAGNOSIS — R53.83 FATIGUE, UNSPECIFIED TYPE: Primary | ICD-10-CM

## 2020-06-11 DIAGNOSIS — R19.00 ABDOMINAL WALL BULGE: ICD-10-CM

## 2020-06-11 DIAGNOSIS — M54.9 BACK PAIN, UNSPECIFIED BACK LOCATION, UNSPECIFIED BACK PAIN LATERALITY, UNSPECIFIED CHRONICITY: ICD-10-CM

## 2020-06-11 DIAGNOSIS — E66.01 OBESITY, CLASS III, BMI 40-49.9 (MORBID OBESITY) (HCC): ICD-10-CM

## 2020-06-11 DIAGNOSIS — F19.11 HX OF DRUG ABUSE (HCC): ICD-10-CM

## 2020-06-11 DIAGNOSIS — Z72.0 TOBACCO USE: ICD-10-CM

## 2020-06-11 DIAGNOSIS — M19.90 OSTEOARTHRITIS, UNSPECIFIED OSTEOARTHRITIS TYPE, UNSPECIFIED SITE: ICD-10-CM

## 2020-06-11 DIAGNOSIS — I25.10 CORONARY ARTERY DISEASE DUE TO LIPID RICH PLAQUE: ICD-10-CM

## 2020-06-11 DIAGNOSIS — I10 ESSENTIAL HYPERTENSION: ICD-10-CM

## 2020-06-11 DIAGNOSIS — E78.5 HYPERLIPIDEMIA, UNSPECIFIED HYPERLIPIDEMIA TYPE: ICD-10-CM

## 2020-06-11 DIAGNOSIS — K21.9 GASTROESOPHAGEAL REFLUX DISEASE, ESOPHAGITIS PRESENCE NOT SPECIFIED: ICD-10-CM

## 2020-06-11 DIAGNOSIS — I25.83 CORONARY ARTERY DISEASE DUE TO LIPID RICH PLAQUE: ICD-10-CM

## 2020-06-11 PROCEDURE — 99214 OFFICE O/P EST MOD 30 MIN: CPT | Performed by: SURGERY

## 2020-06-11 NOTE — PROGRESS NOTES
"[facility] BARIATRIC SURGERY  [address]  [address]  [facility] [address] [phone]      Patient  Name:  Dipak Marinelli  :  1965      Date of Visit: 2020      Chief Complaint:  weight gain; unable to maintain weight loss    History of Present Illness:  Dipak Marinelli is a 54 y.o. male who presents today for evaluation, education and consultation regarding weight loss surgery.     Patient has been overweight for many years, with numerous failed dietary/weight loss attempts.  He now has obesity related comorbidities of HLD, CAD, back pain, depression, back pain, GERD, HTN, OA and as such has decided to pursue weight loss surgery.    No personal or family hx of VTE or clotting d/o.  No liver, lung, or renal disease    \"He has CAD and has 9 heart stents.  Takes Plavix and ASA 81 mg daily, but off currently for having tooth extractions.  Reports his cardiologist would allow him to be off anti-platelets x 6 weeks post op and 1 weeks preop, since his stents were >2 years old.  His cardiologist is in [place].      +Asthma.  Has been hospitalized for asthma and received nebulizer.      Denies liver or kidney disease.  History of heavy alcohol use.  Has been sober x 7 years.    He lives with his wife in [place] and they have 7 biological children and 11 adopted children.    On metformin as a weight loss medicine.    +GERD: Sent by Dr. Chadwick Mobley re: HH and weight loss surgery.  Reviewed his pH monitoring, DeMeester score is 58 (significant reflux).    2018 EGD report from Dr. Mobley reviewed, \"Large hiatal hernia\", GE junction 40 cm.  Clinical photos from this EGD reviewed, but could not see a picture of the hernia.\"    Right knee injury since LOV.  Walking with a cane.    Mainly motivated to have surgery so I will fix the HH. Not a candidate for fundoplication with obese BMI.    He did not agree with me that smoking or his comorbidities contributed to his CAD.        Review " "of data:    RODDY: carisoprolol, hydrocodone  CBC: nl  CMP: nl  Psych: cleared, but suggested counseling for depression  CT scan re: left abdominal wall \"bulge\"--no ventral hernia.  Likely represents denervation of rectus.  EGD: Large HH per OSH EGD  HP neg  Cardiac clearance:  Low risk.  Ok to hold Plavix till 6 weeks post op, but start ASA as soon as possible.   Nicotine test normal.    PFTs: nl  Pulm clearance: cleared  EKG: nl  CXR: nl        Today's update 06/11/20:   His procedure had been cancelled due to COVID pandemic and he is rescheduling his sleeve/HHR now.    His doctor gave him a short supply of narcotic for his knee.   He denies other change to medical hx.    Last tobacco: 6 months  Reports not around secondhand smoke.  Last NSAIDs: n/a  Last ASA and Plavix: 1 week ago  Last steroids: none recent  Last hormones: n/a    Past Medical History:   Diagnosis Date   • Anxiety    • Asthma     Has been on oral steroids for asthma but it has been 10 years ago   • Back pain    • Cancer (CMS/HCC)    • GENTILE (chronic airflow obstruction) (CMS/HCC)    • Coronary artery disease     s/p 8 stents, most recently 2017.  Cardiologist is in [place].   • DDD (degenerative disc disease), cervical    • Depression    • Fatigue    • GERD (gastroesophageal reflux disease)     mostly controlled on Aciphex.  +HH.  Discovered on EGD by Dr. Mobley.    • Hypertension    • Insomnia    • Mixed hyperlipidemia 10/18/2018    on a statin, but mostly due to prevention per cardiologist   • Neck pain    • Osteoarthritis    • Spinal stenosis      Past Surgical History:   Procedure Laterality Date   • BACK SURGERY  1990    (total of 8 back surgeries) [facility]   • BRAVO PROCEDURE N/A 9/19/2018    Procedure: ESOPHAGOGASTRODUODENOSCOPY AND WILDER;  Surgeon: Chadwick Mobley MD;  Location: [facility] OR;  Service: Gastroenterology   • CARDIAC CATHETERIZATION     • CARDIOVASCULAR STRESS TEST     • CORONARY ANGIOPLASTY WITH STENT PLACEMENT     • ELBOW " "PROCEDURE     • ENDOSCOPY      2018 EGD report from Dr. Mobley reviewed, \"Large hiatal hernia\", GE junction 40 cm.  Clinical photos from this EGD reviewed, but could not see a picture of the hernia.   • HERNIA REPAIR      incisional from anterior spinal fusion incision, repaired with mesh, performed in [place]   • LAPAROSCOPIC CHOLECYSTECTOMY      stones, in [place], [place] Dr. Arana   • MOUTH SURGERY     • NECK SURGERY     • OTHER SURGICAL HISTORY      elbow surgery   • SPINAL CORD STIMULATOR IMPLANT  2015    and removal   • TOTAL KNEE ARTHROPLASTY Left        Allergies   Allergen Reactions   • Penicillins Hives     Has tolerated Keflex without issue   • Tape      silk       Current Outpatient Medications:   •  albuterol (PROVENTIL) (2.5 MG/3ML) 0.083% nebulizer solution, Take 2.5 mg by nebulization Every 4 (Four) Hours As Needed for Wheezing., Disp: 30 vial, Rfl: 2  •  amLODIPine (NORVASC) 5 MG tablet, Take 1 tablet by mouth Daily., Disp: 90 tablet, Rfl: 0  •  clopidogrel (PLAVIX) 75 MG tablet, Take 1 tablet by mouth Daily., Disp: 90 tablet, Rfl: 0  •  diclofenac (VOLTAREN) 50 MG EC tablet, Take 1 tablet by mouth 2 (Two) Times a Day As Needed (joint pain)., Disp: 90 tablet, Rfl: 2  •  EQ ASPIRIN ADULT LOW DOSE 81 MG EC tablet, Take 1 tablet by mouth Daily., Disp: 90 tablet, Rfl: 1  •  FLUoxetine (PROzac) 20 MG capsule, Take 3 capsules by mouth Daily., Disp: 270 capsule, Rfl: 1  •  HYDROcodone-acetaminophen (Norco) 5-325 MG per tablet, Take 1 tablet by mouth Every 6 (Six) Hours As Needed for Moderate Pain  for up to 90 days., Disp: 28 tablet, Rfl: 0  •  isosorbide mononitrate (Imdur) 30 MG 24 hr tablet, Take 1 tablet by mouth Daily., Disp: 90 tablet, Rfl: 0  •  lisinopril (PRINIVIL,ZESTRIL) 20 MG tablet, Take 1 tablet by mouth Daily., Disp: 90 tablet, Rfl: 0  •  loratadine (CLARITIN) 10 MG tablet, Take 1 tablet by mouth Daily., Disp: 90 tablet, Rfl: 2  •  metFORMIN (GLUCOPHAGE) 500 MG tablet, Take 1 tablet by mouth 2 " (Two) Times a Day With Meals., Disp: 180 tablet, Rfl: 1  •  metoprolol succinate XL (TOPROL-XL) 25 MG 24 hr tablet, Take 1 tablet by mouth Daily., Disp: 90 tablet, Rfl: 0  •  nitroglycerin (NITROSTAT) 0.4 MG SL tablet, Place 1 tablet under the tongue Every 5 (Five) Minutes As Needed for Chest Pain. Take no more than 3 doses in 15 minutes., Disp: 30 tablet, Rfl: 1  •  ondansetron ODT (ZOFRAN-ODT) 4 MG disintegrating tablet, Take 4 mg by mouth Every 8 (Eight) Hours As Needed for Nausea or Vomiting., Disp: , Rfl:   •  pantoprazole (PROTONIX) 40 MG EC tablet, Take 1 tablet by mouth Daily., Disp: 90 tablet, Rfl: 0  •  ranolazine (RANEXA) 500 MG 12 hr tablet, Take 1 tablet by mouth 2 (Two) Times a Day., Disp: 180 tablet, Rfl: 0  •  rosuvastatin (CRESTOR) 10 MG tablet, Take 1 tablet by mouth Daily., Disp: 90 tablet, Rfl: 0  •  VENTOLIN  (90 Base) MCG/ACT inhaler, Inhale 1 puff Every 4 (Four) Hours As Needed for Wheezing., Disp: 1 inhaler, Rfl: 2    Social History     Socioeconomic History   • Marital status:      Spouse name: Not on file   • Number of children: Not on file   • Years of education: Not on file   • Highest education level: Not on file   Tobacco Use   • Smoking status: Former Smoker     Packs/day: 0.50     Types: Cigarettes     Last attempt to quit: 10/31/2019     Years since quittin.6   • Smokeless tobacco: Never Used   • Tobacco comment: using nicotine gum to quit, Is around secondhand smoke in house and car.   Substance and Sexual Activity   • Alcohol use: Yes     Frequency: 2-4 times a month     Drinks per session: 1 or 2     Comment: formerly alcoholic   • Drug use: Yes     Types: Marijuana     Comment: smokes marijuana 2-3x per month.     • Sexual activity: Defer   Social History Narrative    Stays home due to disability for back, heart disease.  Formerly in commercial constructions.  Lives wife and 1 child.      Family History   Problem Relation Age of Onset   • Heart disease Mother   "  • Heart disease Father    • Diabetes Father    • Depression Sister    • Lupus Sister        Review of Systems   Constitutional: Positive for fatigue and unexpected weight gain. Negative for chills, diaphoresis, fever and unexpected weight loss.   HENT: Negative for congestion and facial swelling.    Eyes: Negative for blurred vision, double vision and discharge.   Respiratory: Negative for chest tightness, shortness of breath and stridor.    Cardiovascular: Negative for chest pain, palpitations and leg swelling.   Gastrointestinal: Negative for blood in stool.   Endocrine: Negative for polydipsia.   Genitourinary: Negative for hematuria.   Musculoskeletal: Positive for arthralgias.   Skin: Negative for color change.   Allergic/Immunologic: Negative for immunocompromised state.   Neurological: Negative for confusion.   Psychiatric/Behavioral: Negative for self-injury.       Physical Exam:  Vital Signs:      There is no height or weight on file to calculate BMI.                Physical Exam   Constitutional: He is oriented to person, place, and time. He appears well-developed and well-nourished. No distress.   HENT:   Head: Normocephalic and atraumatic.   Mouth/Throat: No oropharyngeal exudate.   Eyes: Pupils are equal, round, and reactive to light. Conjunctivae and EOM are normal. No scleral icterus.   Pulmonary/Chest: Effort normal. No respiratory distress.   Neurological: He is alert and oriented to person, place, and time.   Skin: No rash noted. He is not diaphoretic.   Psychiatric: He has a normal mood and affect. His behavior is normal.     Previous physical exam performed in person:  \"  Physical Exam   Constitutional: He is oriented to person, place, and time. He appears well-developed and well-nourished.   HENT:   Head: Normocephalic and atraumatic.   Nose: Nose normal.   Eyes: Pupils are equal, round, and reactive to light. Conjunctivae and EOM are normal.   Neck: Normal range of motion. Neck supple. " "Carotid bruit is not present. No tracheal deviation present. No thyromegaly present.   Cardiovascular: Normal rate, regular rhythm and normal heart sounds.   Pulmonary/Chest: Effort normal and breath sounds normal. No respiratory distress.   Abdominal: Soft. He exhibits no distension. There is no hepatosplenomegaly. There is no tenderness.   Lap scars.  Lax upper abdomen.  Diastasis.  Left paramedian incision.   Musculoskeletal: Normal range of motion. He exhibits edema. He exhibits no deformity.   Neurological: He is alert and oriented to person, place, and time. No cranial nerve deficit. Coordination normal.   Ambulates with cane   Skin: Skin is warm and dry. No rash noted.   B/l peripheral edema, shiny skin   Psychiatric: He has a normal mood and affect. His behavior is normal. Judgment and thought content normal. \"    Patient Active Problem List   Diagnosis   • Degenerative disc disease, lumbar   • History of lumbar surgery   • Chronic bilateral low back pain   • BMI 40.0-44.9, adult (CMS/formerly [facility])   • Generalized abdominal pain   • Non-recurrent unilateral inguinal hernia without obstruction or gangrene   • History of angina pectoris   • Coronary artery disease involving native coronary artery of native heart without angina pectoris   • Mixed hyperlipidemia   • GERD with esophagitis   • Recurrent major depressive disorder, in partial remission (CMS/formerly [facility])   • Dyspnea on exertion   • Total knee replacement status, left   • Seasonal allergies   • Tobacco abuse   • Weight loss counseling, encounter for   • Debility   • Segmental and somatic dysfunction of thoracic region   • Segmental and somatic dysfunction of lumbar region   • Segmental and somatic dysfunction of pelvic region   • Knee effusion, right   • Fatigue   • Obesity, Class III, BMI 40-49.9 (morbid obesity) (CMS/formerly [facility])       Assessment:    Dipak Marinelli is a 54 y.o. year old male with medically complicated obesity.    Weight loss surgery is deemed medically " "necessary given the following obesity related comorbidities including HLD, CAD, back pain, depression, back pain, GERD, HTN, OA with BMI of 43    Patient is aware that surgery is a tool, and that weight loss is not guaranteed but only seen in the context of appropriate use, follow up and exercise.    The patient was present for an approximately a 2.5 hour discussion of the purpose of weight loss surgery, how WLS is a tool to assist in achieving weight loss goals, the most common complications and how best to avoid them, and the strategies for short and long term weight loss.  Ample opportunity to discuss questions was available both in group and during the time of individual examination.    I reviewed all available preop labs, Xrays, tests, clearances, etc and signed off on these in the record.  All of this in addition to the patient's unique history and exam has been taken into consideration in determining their appropriate candidacy for weight loss surgery.    Complications  of laparoscopic/possible robotic gastric sleeve were discussed. The patient is well aware of the potential complications of surgery that include but not limited to bleeding, infections, deep venous thrombosis, pulmonary embolism, pulmonary complications such as pneumonia, cardiac events, hernias, small bowel obstruction, damage to the spleen or other organs, bowel injury, disfiguring scars, failure to lose weight, need for additional surgery, conversion to an open procedure, and death. Patient is also aware of complications which apply in this particular procedure that can include but are not limited to a \"leak\" at the staple line which in some instances may require conversion to gastric bypass.    The patient is aware if a hiatal hernia is encountered, it likely will be repaired.  R/B/A Rx to hiatal hernia repair were discussed as outlined in our long consent form.  Briefly risks in addition to those for LSG include recurrent hernia, AB, " dysphagia, esophageal injury, pneumothorax, injury to the vagus nerves, injury to the thoracic duct, aorta or vena cava.    Greater than 3 minutes was spent with the patient discussing avoiding all tobacco products and second hand smoke at least 2 weeks pre-operatively and 6 weeks post-operatively to minimize the risk of sleeve leak.  This included discussing the importance of avoiding even secondhand smoke as the risk of leak is increased.  Examples discussed:  I made it very clear that the patient understands they should avoid even riding in a car where someone has previously smoked in the last 2 weeks, living in a house where someone smokes (even if it's in a separate room/patio/attached garage, etc.) we discussed that they should not have a conversation with a group of people who are smoking even if it's outside.  They can be around wood burning fires and barbecue.  I told them I do not know if marijuana has a same effects but my overall recommendation is to avoid it for 2 weeks prior in 6 weeks after surgery.  They also are aware that nicotine may also increase the risk of leak and I strongly encouraged him to avoid that as well for 2 weeks prior in 6 weeks after surgery.    Discussed the risks, benefits and alternative therapies at great length as outlined in our extensive consent forms, consent videos, and educational teaching process under the direction of the center's .    A copy of the patient's signed informed consent is on file.    Plan: Laparoscopic sleeve gastrectomy + HHR at [facility].    I explained that my usual practice with cardiac stent patients has been to hold the ASA and/or plavix 1 wk prior and instead of the 6 wks afterwards (safest to hopefully avoid a leak), resume at 2 wks post op with PPI coverage.  So far, this has not resulted in any leaks to date, but the sample size is relatively small.  The idea is to balance the risk of stent occlusion with the risk of leak  and there's no perfect solution.  He is agreeable.    Higher risk patient due to previous abdominal surgeries, CAD on ASA, hx of smoking, asthma, hx of heavy alcohol use, asthma.      He has reflux, and although HHR with LSG, it could still be a problem post op.  Other option of Juan C-en-Y gastric bypass would be unfavorable given his need for lifelong ASA/Plavix and the almost certain risk of ulcerating a bypass.  LSG would be more amenable to his long-term ASA use.    R/B/A Rx discussed to postop anticoagulation incl but not limited to bleeding, drug reaction, venothromboembolic events, etc. and he declined.    MDM high:  Elective procedure with the following risk factors: morbid obesity, CAD, HTN  4+ chronic medical problems reviewed.    This visit was conducted as a video visit, in an effort to limit spread of the novel coronavirus during the 2020 pandemic.          Sonya Barr MD

## 2020-06-21 ENCOUNTER — APPOINTMENT (OUTPATIENT)
Dept: PREADMISSION TESTING | Facility: HOSPITAL | Age: 55
End: 2020-06-21

## 2020-06-21 LAB
DEPRECATED RDW RBC AUTO: 44.8 FL (ref 37–54)
ERYTHROCYTE [DISTWIDTH] IN BLOOD BY AUTOMATED COUNT: 13.9 % (ref 12.3–15.4)
HBA1C MFR BLD: 6.7 % (ref 4.8–5.6)
HCT VFR BLD AUTO: 45.6 % (ref 37.5–51)
HGB BLD-MCNC: 15 G/DL (ref 13–17.7)
MCH RBC QN AUTO: 29 PG (ref 26.6–33)
MCHC RBC AUTO-ENTMCNC: 32.9 G/DL (ref 31.5–35.7)
MCV RBC AUTO: 88.2 FL (ref 79–97)
PLATELET # BLD AUTO: 231 10*3/MM3 (ref 140–450)
PMV BLD AUTO: 9.3 FL (ref 6–12)
POTASSIUM BLD-SCNC: 4.4 MMOL/L (ref 3.5–5.2)
RBC # BLD AUTO: 5.17 10*6/MM3 (ref 4.14–5.8)
WBC NRBC COR # BLD: 11.99 10*3/MM3 (ref 3.4–10.8)

## 2020-06-21 PROCEDURE — C9803 HOPD COVID-19 SPEC COLLECT: HCPCS

## 2020-06-21 PROCEDURE — 85027 COMPLETE CBC AUTOMATED: CPT | Performed by: SURGERY

## 2020-06-21 PROCEDURE — U0002 COVID-19 LAB TEST NON-CDC: HCPCS

## 2020-06-21 PROCEDURE — 36415 COLL VENOUS BLD VENIPUNCTURE: CPT

## 2020-06-21 PROCEDURE — 84132 ASSAY OF SERUM POTASSIUM: CPT | Performed by: SURGERY

## 2020-06-21 PROCEDURE — 83036 HEMOGLOBIN GLYCOSYLATED A1C: CPT | Performed by: SURGERY

## 2020-06-22 LAB
REF LAB TEST METHOD: NORMAL
SARS-COV-2 RNA RESP QL NAA+PROBE: NOT DETECTED

## 2020-06-23 ENCOUNTER — ANESTHESIA (OUTPATIENT)
Dept: PERIOP | Facility: HOSPITAL | Age: 55
End: 2020-06-23

## 2020-06-23 ENCOUNTER — ANESTHESIA EVENT (OUTPATIENT)
Dept: PERIOP | Facility: HOSPITAL | Age: 55
End: 2020-06-23

## 2020-06-23 ENCOUNTER — HOSPITAL ENCOUNTER (INPATIENT)
Facility: HOSPITAL | Age: 55
LOS: 4 days | Discharge: HOME | End: 2020-06-27
Attending: SURGERY | Admitting: SURGERY

## 2020-06-23 DIAGNOSIS — E66.01 OBESITY, CLASS III, BMI 40-49.9 (MORBID OBESITY) (HCC): ICD-10-CM

## 2020-06-23 PROCEDURE — 25010000002 METOCLOPRAMIDE PER 10 MG: Performed by: SURGERY

## 2020-06-23 PROCEDURE — 88305 TISSUE EXAM BY PATHOLOGIST: CPT | Performed by: SURGERY

## 2020-06-23 PROCEDURE — 25010000002 HYDROMORPHONE PER 4 MG: Performed by: NURSE ANESTHETIST, CERTIFIED REGISTERED

## 2020-06-23 PROCEDURE — 25010000002 HYDROMORPHONE PER 4 MG: Performed by: SURGERY

## 2020-06-23 PROCEDURE — 88307 TISSUE EXAM BY PATHOLOGIST: CPT | Performed by: SURGERY

## 2020-06-23 PROCEDURE — 0DB64Z3 EXCISION OF STOMACH, PERCUTANEOUS ENDOSCOPIC APPROACH, VERTICAL: ICD-10-PCS | Performed by: SURGERY

## 2020-06-23 PROCEDURE — 43775 LAP SLEEVE GASTRECTOMY: CPT | Performed by: SURGERY

## 2020-06-23 PROCEDURE — 25010000002 ONDANSETRON PER 1 MG: Performed by: NURSE ANESTHETIST, CERTIFIED REGISTERED

## 2020-06-23 PROCEDURE — 25010000002 DEXAMETHASONE PER 1 MG: Performed by: NURSE ANESTHETIST, CERTIFIED REGISTERED

## 2020-06-23 PROCEDURE — 25010000002 PROPOFOL 10 MG/ML EMULSION: Performed by: NURSE ANESTHETIST, CERTIFIED REGISTERED

## 2020-06-23 PROCEDURE — 0BQT4ZZ REPAIR DIAPHRAGM, PERCUTANEOUS ENDOSCOPIC APPROACH: ICD-10-PCS | Performed by: SURGERY

## 2020-06-23 PROCEDURE — 25010000002 ONDANSETRON PER 1 MG: Performed by: SURGERY

## 2020-06-23 PROCEDURE — 25010000002 DEXAMETHASONE SODIUM PHOSPHATE 10 MG/ML SOLUTION: Performed by: ANESTHESIOLOGY

## 2020-06-23 PROCEDURE — 25010000003 CEFAZOLIN IN DEXTROSE 2-4 GM/100ML-% SOLUTION: Performed by: SURGERY

## 2020-06-23 PROCEDURE — 25010000002 BUPRENORPHINE PER 0.1 MG: Performed by: ANESTHESIOLOGY

## 2020-06-23 PROCEDURE — 25010000002 FENTANYL CITRATE (PF) 100 MCG/2ML SOLUTION: Performed by: NURSE ANESTHETIST, CERTIFIED REGISTERED

## 2020-06-23 PROCEDURE — 25010000002 ENOXAPARIN PER 10 MG: Performed by: SURGERY

## 2020-06-23 PROCEDURE — 0DJ08ZZ INSPECTION OF UPPER INTESTINAL TRACT, VIA NATURAL OR ARTIFICIAL OPENING ENDOSCOPIC: ICD-10-PCS | Performed by: SURGERY

## 2020-06-23 PROCEDURE — 43281 LAP PARAESOPHAG HERN REPAIR: CPT | Performed by: SURGERY

## 2020-06-23 PROCEDURE — 0WCG4ZZ EXTIRPATION OF MATTER FROM PERITONEAL CAVITY, PERCUTANEOUS ENDOSCOPIC APPROACH: ICD-10-PCS | Performed by: SURGERY

## 2020-06-23 DEVICE — SEALANT WND FIBRIN TISSEEL PREFIL/SYR/PRIMAFZ 4ML: Type: IMPLANTABLE DEVICE | Site: STOMACH | Status: FUNCTIONAL

## 2020-06-23 DEVICE — REINFORCED INTELLIGENT RELOAD, FOR USE WITH SIGNIA STAPLING SYSTEM
Type: IMPLANTABLE DEVICE | Site: STOMACH | Status: FUNCTIONAL
Brand: TRI-STAPLE 2.0

## 2020-06-23 DEVICE — BLACK REINFORCED INTELLIGENT RELOAD, FOR USE WITH SIGNIA STAPLING SYSTEM
Type: IMPLANTABLE DEVICE | Site: STOMACH | Status: FUNCTIONAL
Brand: TRI-STAPLE 2.0

## 2020-06-23 DEVICE — FLOSEAL HEMOSTATIC MATRIX, 10ML
Type: IMPLANTABLE DEVICE | Site: STOMACH | Status: FUNCTIONAL
Brand: FLOSEAL HEMOSTATIC MATRIX

## 2020-06-23 RX ORDER — PROMETHAZINE HYDROCHLORIDE 25 MG/ML
12.5 INJECTION, SOLUTION INTRAMUSCULAR; INTRAVENOUS EVERY 6 HOURS PRN
Status: DISCONTINUED | OUTPATIENT
Start: 2020-06-23 | End: 2020-06-27 | Stop reason: HOSPADM

## 2020-06-23 RX ORDER — LABETALOL HYDROCHLORIDE 5 MG/ML
INJECTION, SOLUTION INTRAVENOUS AS NEEDED
Status: DISCONTINUED | OUTPATIENT
Start: 2020-06-23 | End: 2020-06-23 | Stop reason: SURG

## 2020-06-23 RX ORDER — HYDROMORPHONE HYDROCHLORIDE 1 MG/ML
0.5 INJECTION, SOLUTION INTRAMUSCULAR; INTRAVENOUS; SUBCUTANEOUS
Status: DISCONTINUED | OUTPATIENT
Start: 2020-06-23 | End: 2020-06-23 | Stop reason: HOSPADM

## 2020-06-23 RX ORDER — ACETAMINOPHEN 500 MG
1000 TABLET ORAL EVERY 8 HOURS SCHEDULED
Status: DISCONTINUED | OUTPATIENT
Start: 2020-06-23 | End: 2020-06-27 | Stop reason: HOSPADM

## 2020-06-23 RX ORDER — MAGNESIUM HYDROXIDE 1200 MG/15ML
LIQUID ORAL AS NEEDED
Status: DISCONTINUED | OUTPATIENT
Start: 2020-06-23 | End: 2020-06-23 | Stop reason: HOSPADM

## 2020-06-23 RX ORDER — ALPRAZOLAM 0.25 MG/1
0.25 TABLET ORAL 2 TIMES DAILY PRN
Status: DISCONTINUED | OUTPATIENT
Start: 2020-06-23 | End: 2020-06-27 | Stop reason: HOSPADM

## 2020-06-23 RX ORDER — FENTANYL CITRATE 50 UG/ML
50 INJECTION, SOLUTION INTRAMUSCULAR; INTRAVENOUS
Status: DISCONTINUED | OUTPATIENT
Start: 2020-06-23 | End: 2020-06-23 | Stop reason: HOSPADM

## 2020-06-23 RX ORDER — CYANOCOBALAMIN 1000 UG/ML
1000 INJECTION, SOLUTION INTRAMUSCULAR; SUBCUTANEOUS ONCE
Status: COMPLETED | OUTPATIENT
Start: 2020-06-24 | End: 2020-06-24

## 2020-06-23 RX ORDER — GABAPENTIN 100 MG/1
100 CAPSULE ORAL 3 TIMES DAILY
Status: DISPENSED | OUTPATIENT
Start: 2020-06-23 | End: 2020-06-25

## 2020-06-23 RX ORDER — PANTOPRAZOLE SODIUM 40 MG/10ML
40 INJECTION, POWDER, LYOPHILIZED, FOR SOLUTION INTRAVENOUS ONCE
Status: COMPLETED | OUTPATIENT
Start: 2020-06-23 | End: 2020-06-23

## 2020-06-23 RX ORDER — SODIUM CHLORIDE AND POTASSIUM CHLORIDE 150; 450 MG/100ML; MG/100ML
100 INJECTION, SOLUTION INTRAVENOUS CONTINUOUS
Status: DISCONTINUED | OUTPATIENT
Start: 2020-06-24 | End: 2020-06-24

## 2020-06-23 RX ORDER — SODIUM CHLORIDE, SODIUM LACTATE, POTASSIUM CHLORIDE, CALCIUM CHLORIDE 600; 310; 30; 20 MG/100ML; MG/100ML; MG/100ML; MG/100ML
150 INJECTION, SOLUTION INTRAVENOUS CONTINUOUS
Status: ACTIVE | OUTPATIENT
Start: 2020-06-23 | End: 2020-06-24

## 2020-06-23 RX ORDER — GABAPENTIN 300 MG/1
600 CAPSULE ORAL ONCE
Status: COMPLETED | OUTPATIENT
Start: 2020-06-23 | End: 2020-06-23

## 2020-06-23 RX ORDER — CEFAZOLIN SODIUM 2 G/100ML
2 INJECTION, SOLUTION INTRAVENOUS ONCE
Status: DISCONTINUED | OUTPATIENT
Start: 2020-06-23 | End: 2020-06-23 | Stop reason: HOSPADM

## 2020-06-23 RX ORDER — FAMOTIDINE 20 MG/1
20 TABLET, FILM COATED ORAL ONCE
Status: CANCELLED | OUTPATIENT
Start: 2020-06-23 | End: 2020-06-23

## 2020-06-23 RX ORDER — PANTOPRAZOLE SODIUM 40 MG/10ML
40 INJECTION, POWDER, LYOPHILIZED, FOR SOLUTION INTRAVENOUS ONCE
Status: DISCONTINUED | OUTPATIENT
Start: 2020-06-23 | End: 2020-06-27 | Stop reason: HOSPADM

## 2020-06-23 RX ORDER — PROPOFOL 10 MG/ML
VIAL (ML) INTRAVENOUS AS NEEDED
Status: DISCONTINUED | OUTPATIENT
Start: 2020-06-23 | End: 2020-06-23 | Stop reason: SURG

## 2020-06-23 RX ORDER — SODIUM CHLORIDE 0.9 % (FLUSH) 0.9 %
10 SYRINGE (ML) INJECTION EVERY 12 HOURS SCHEDULED
Status: CANCELLED | OUTPATIENT
Start: 2020-06-23

## 2020-06-23 RX ORDER — OXYCODONE HYDROCHLORIDE 5 MG/1
5 TABLET ORAL EVERY 6 HOURS PRN
Status: DISCONTINUED | OUTPATIENT
Start: 2020-06-23 | End: 2020-06-27 | Stop reason: HOSPADM

## 2020-06-23 RX ORDER — SODIUM CHLORIDE 0.9 % (FLUSH) 0.9 %
10 SYRINGE (ML) INJECTION AS NEEDED
Status: CANCELLED | OUTPATIENT
Start: 2020-06-23

## 2020-06-23 RX ORDER — DEXAMETHASONE SODIUM PHOSPHATE 10 MG/ML
INJECTION, SOLUTION INTRAMUSCULAR; INTRAVENOUS
Status: COMPLETED | OUTPATIENT
Start: 2020-06-23 | End: 2020-06-23

## 2020-06-23 RX ORDER — CHLORHEXIDINE GLUCONATE 0.12 MG/ML
15 RINSE ORAL
Status: COMPLETED | OUTPATIENT
Start: 2020-06-23 | End: 2020-06-23

## 2020-06-23 RX ORDER — SCOLOPAMINE TRANSDERMAL SYSTEM 1 MG/1
1 PATCH, EXTENDED RELEASE TRANSDERMAL ONCE
Status: DISCONTINUED | OUTPATIENT
Start: 2020-06-23 | End: 2020-06-23 | Stop reason: HOSPADM

## 2020-06-23 RX ORDER — SODIUM CHLORIDE, SODIUM LACTATE, POTASSIUM CHLORIDE, CALCIUM CHLORIDE 600; 310; 30; 20 MG/100ML; MG/100ML; MG/100ML; MG/100ML
INJECTION, SOLUTION INTRAVENOUS CONTINUOUS PRN
Status: DISCONTINUED | OUTPATIENT
Start: 2020-06-23 | End: 2020-06-23 | Stop reason: SURG

## 2020-06-23 RX ORDER — NITROGLYCERIN 0.4 MG/1
0.4 TABLET SUBLINGUAL
Status: DISCONTINUED | OUTPATIENT
Start: 2020-06-23 | End: 2020-06-27 | Stop reason: HOSPADM

## 2020-06-23 RX ORDER — SIMETHICONE 80 MG
80 TABLET,CHEWABLE ORAL 4 TIMES DAILY PRN
Status: DISCONTINUED | OUTPATIENT
Start: 2020-06-23 | End: 2020-06-27 | Stop reason: HOSPADM

## 2020-06-23 RX ORDER — SODIUM CHLORIDE 0.9 % (FLUSH) 0.9 %
3 SYRINGE (ML) INJECTION EVERY 12 HOURS SCHEDULED
Status: DISCONTINUED | OUTPATIENT
Start: 2020-06-23 | End: 2020-06-27 | Stop reason: HOSPADM

## 2020-06-23 RX ORDER — FENTANYL CITRATE 50 UG/ML
INJECTION, SOLUTION INTRAMUSCULAR; INTRAVENOUS AS NEEDED
Status: DISCONTINUED | OUTPATIENT
Start: 2020-06-23 | End: 2020-06-23 | Stop reason: SURG

## 2020-06-23 RX ORDER — LIDOCAINE HYDROCHLORIDE 10 MG/ML
0.5 INJECTION, SOLUTION EPIDURAL; INFILTRATION; INTRACAUDAL; PERINEURAL ONCE AS NEEDED
Status: CANCELLED | OUTPATIENT
Start: 2020-06-23

## 2020-06-23 RX ORDER — HYDROMORPHONE HYDROCHLORIDE 1 MG/ML
0.5 INJECTION, SOLUTION INTRAMUSCULAR; INTRAVENOUS; SUBCUTANEOUS
Status: DISCONTINUED | OUTPATIENT
Start: 2020-06-23 | End: 2020-06-27 | Stop reason: HOSPADM

## 2020-06-23 RX ORDER — LABETALOL HYDROCHLORIDE 5 MG/ML
10 INJECTION, SOLUTION INTRAVENOUS
Status: COMPLETED | OUTPATIENT
Start: 2020-06-23 | End: 2020-06-24

## 2020-06-23 RX ORDER — METOPROLOL SUCCINATE 25 MG/1
25 TABLET, EXTENDED RELEASE ORAL DAILY
Status: DISCONTINUED | OUTPATIENT
Start: 2020-06-24 | End: 2020-06-27 | Stop reason: HOSPADM

## 2020-06-23 RX ORDER — ONDANSETRON 2 MG/ML
4 INJECTION INTRAMUSCULAR; INTRAVENOUS EVERY 6 HOURS
Status: COMPLETED | OUTPATIENT
Start: 2020-06-23 | End: 2020-06-24

## 2020-06-23 RX ORDER — ROSUVASTATIN CALCIUM 10 MG/1
10 TABLET, COATED ORAL DAILY
Status: DISCONTINUED | OUTPATIENT
Start: 2020-06-24 | End: 2020-06-27 | Stop reason: HOSPADM

## 2020-06-23 RX ORDER — FLUOXETINE HYDROCHLORIDE 20 MG/1
60 CAPSULE ORAL DAILY
Status: DISCONTINUED | OUTPATIENT
Start: 2020-06-24 | End: 2020-06-27 | Stop reason: HOSPADM

## 2020-06-23 RX ORDER — ONDANSETRON 2 MG/ML
INJECTION INTRAMUSCULAR; INTRAVENOUS AS NEEDED
Status: DISCONTINUED | OUTPATIENT
Start: 2020-06-23 | End: 2020-06-23 | Stop reason: SURG

## 2020-06-23 RX ORDER — SODIUM CHLORIDE 0.9 % (FLUSH) 0.9 %
10 SYRINGE (ML) INJECTION AS NEEDED
Status: DISCONTINUED | OUTPATIENT
Start: 2020-06-23 | End: 2020-06-23 | Stop reason: HOSPADM

## 2020-06-23 RX ORDER — LIDOCAINE HYDROCHLORIDE 10 MG/ML
INJECTION, SOLUTION EPIDURAL; INFILTRATION; INTRACAUDAL; PERINEURAL AS NEEDED
Status: DISCONTINUED | OUTPATIENT
Start: 2020-06-23 | End: 2020-06-23 | Stop reason: SURG

## 2020-06-23 RX ORDER — METOPROLOL SUCCINATE 25 MG/1
25 TABLET, EXTENDED RELEASE ORAL ONCE
Status: COMPLETED | OUTPATIENT
Start: 2020-06-23 | End: 2020-06-23

## 2020-06-23 RX ORDER — AMLODIPINE BESYLATE 5 MG/1
5 TABLET ORAL DAILY
Status: DISCONTINUED | OUTPATIENT
Start: 2020-06-24 | End: 2020-06-27 | Stop reason: HOSPADM

## 2020-06-23 RX ORDER — PANTOPRAZOLE SODIUM 40 MG/1
40 TABLET, DELAYED RELEASE ORAL EVERY MORNING
Status: DISCONTINUED | OUTPATIENT
Start: 2020-06-24 | End: 2020-06-27 | Stop reason: HOSPADM

## 2020-06-23 RX ORDER — PROMETHAZINE HYDROCHLORIDE 12.5 MG/1
12.5 TABLET ORAL EVERY 6 HOURS PRN
Status: DISCONTINUED | OUTPATIENT
Start: 2020-06-23 | End: 2020-06-27 | Stop reason: HOSPADM

## 2020-06-23 RX ORDER — ACETAMINOPHEN 500 MG
1000 TABLET ORAL ONCE
Status: COMPLETED | OUTPATIENT
Start: 2020-06-23 | End: 2020-06-23

## 2020-06-23 RX ORDER — ALBUTEROL SULFATE 90 UG/1
1 AEROSOL, METERED RESPIRATORY (INHALATION) EVERY 4 HOURS PRN
Status: DISCONTINUED | OUTPATIENT
Start: 2020-06-23 | End: 2020-06-27 | Stop reason: HOSPADM

## 2020-06-23 RX ORDER — SODIUM CHLORIDE 0.9 % (FLUSH) 0.9 %
10 SYRINGE (ML) INJECTION EVERY 12 HOURS SCHEDULED
Status: DISCONTINUED | OUTPATIENT
Start: 2020-06-23 | End: 2020-06-23 | Stop reason: HOSPADM

## 2020-06-23 RX ORDER — LIDOCAINE HYDROCHLORIDE 10 MG/ML
0.5 INJECTION, SOLUTION EPIDURAL; INFILTRATION; INTRACAUDAL; PERINEURAL ONCE AS NEEDED
Status: COMPLETED | OUTPATIENT
Start: 2020-06-23 | End: 2020-06-23

## 2020-06-23 RX ORDER — ONDANSETRON 2 MG/ML
4 INJECTION INTRAMUSCULAR; INTRAVENOUS ONCE AS NEEDED
Status: COMPLETED | OUTPATIENT
Start: 2020-06-23 | End: 2020-06-23

## 2020-06-23 RX ORDER — HYDROMORPHONE HYDROCHLORIDE 2 MG/1
2 TABLET ORAL
Status: DISCONTINUED | OUTPATIENT
Start: 2020-06-23 | End: 2020-06-27 | Stop reason: HOSPADM

## 2020-06-23 RX ORDER — SODIUM CHLORIDE 0.9 % (FLUSH) 0.9 %
1-10 SYRINGE (ML) INJECTION AS NEEDED
Status: DISCONTINUED | OUTPATIENT
Start: 2020-06-23 | End: 2020-06-27 | Stop reason: HOSPADM

## 2020-06-23 RX ORDER — PROCHLORPERAZINE MALEATE 10 MG
10 TABLET ORAL EVERY 6 HOURS PRN
Status: DISCONTINUED | OUTPATIENT
Start: 2020-06-23 | End: 2020-06-27 | Stop reason: HOSPADM

## 2020-06-23 RX ORDER — ALBUTEROL SULFATE 2.5 MG/3ML
2.5 SOLUTION RESPIRATORY (INHALATION) EVERY 4 HOURS PRN
Status: DISCONTINUED | OUTPATIENT
Start: 2020-06-23 | End: 2020-06-27 | Stop reason: HOSPADM

## 2020-06-23 RX ORDER — SIMETHICONE 80 MG
80 TABLET,CHEWABLE ORAL ONCE
Status: COMPLETED | OUTPATIENT
Start: 2020-06-23 | End: 2020-06-23

## 2020-06-23 RX ORDER — DIPHENHYDRAMINE HYDROCHLORIDE 50 MG/ML
25 INJECTION INTRAMUSCULAR; INTRAVENOUS EVERY 4 HOURS PRN
Status: DISCONTINUED | OUTPATIENT
Start: 2020-06-23 | End: 2020-06-27 | Stop reason: HOSPADM

## 2020-06-23 RX ORDER — CETIRIZINE HYDROCHLORIDE 10 MG/1
10 TABLET ORAL DAILY
Status: DISCONTINUED | OUTPATIENT
Start: 2020-06-24 | End: 2020-06-27 | Stop reason: HOSPADM

## 2020-06-23 RX ORDER — BUPIVACAINE HYDROCHLORIDE 2.5 MG/ML
INJECTION, SOLUTION EPIDURAL; INFILTRATION; INTRACAUDAL
Status: COMPLETED | OUTPATIENT
Start: 2020-06-23 | End: 2020-06-23

## 2020-06-23 RX ORDER — DEXAMETHASONE SODIUM PHOSPHATE 4 MG/ML
INJECTION, SOLUTION INTRA-ARTICULAR; INTRALESIONAL; INTRAMUSCULAR; INTRAVENOUS; SOFT TISSUE AS NEEDED
Status: DISCONTINUED | OUTPATIENT
Start: 2020-06-23 | End: 2020-06-23 | Stop reason: SURG

## 2020-06-23 RX ORDER — SODIUM CHLORIDE 9 MG/ML
150 INJECTION, SOLUTION INTRAVENOUS CONTINUOUS
Status: DISCONTINUED | OUTPATIENT
Start: 2020-06-23 | End: 2020-06-24

## 2020-06-23 RX ORDER — ROCURONIUM BROMIDE 10 MG/ML
INJECTION, SOLUTION INTRAVENOUS AS NEEDED
Status: DISCONTINUED | OUTPATIENT
Start: 2020-06-23 | End: 2020-06-23 | Stop reason: SURG

## 2020-06-23 RX ORDER — RABEPRAZOLE SODIUM 20 MG/1
20 TABLET, DELAYED RELEASE ORAL DAILY
COMMUNITY
End: 2020-09-14

## 2020-06-23 RX ORDER — BUPRENORPHINE HYDROCHLORIDE 0.32 MG/ML
INJECTION INTRAMUSCULAR; INTRAVENOUS
Status: COMPLETED | OUTPATIENT
Start: 2020-06-23 | End: 2020-06-23

## 2020-06-23 RX ORDER — METOCLOPRAMIDE HYDROCHLORIDE 5 MG/ML
10 INJECTION INTRAMUSCULAR; INTRAVENOUS EVERY 6 HOURS PRN
Status: DISCONTINUED | OUTPATIENT
Start: 2020-06-23 | End: 2020-06-27 | Stop reason: HOSPADM

## 2020-06-23 RX ORDER — CEFAZOLIN SODIUM 2 G/100ML
2 INJECTION, SOLUTION INTRAVENOUS EVERY 8 HOURS
Status: COMPLETED | OUTPATIENT
Start: 2020-06-23 | End: 2020-06-24

## 2020-06-23 RX ORDER — ONDANSETRON 4 MG/1
4 TABLET, FILM COATED ORAL EVERY 6 HOURS PRN
Status: DISCONTINUED | OUTPATIENT
Start: 2020-06-24 | End: 2020-06-27 | Stop reason: HOSPADM

## 2020-06-23 RX ORDER — SODIUM CHLORIDE, SODIUM LACTATE, POTASSIUM CHLORIDE, CALCIUM CHLORIDE 600; 310; 30; 20 MG/100ML; MG/100ML; MG/100ML; MG/100ML
9 INJECTION, SOLUTION INTRAVENOUS CONTINUOUS
Status: CANCELLED | OUTPATIENT
Start: 2020-06-23

## 2020-06-23 RX ORDER — NALOXONE HCL 0.4 MG/ML
0.1 VIAL (ML) INJECTION
Status: DISCONTINUED | OUTPATIENT
Start: 2020-06-23 | End: 2020-06-27 | Stop reason: HOSPADM

## 2020-06-23 RX ORDER — FAMOTIDINE 10 MG/ML
20 INJECTION, SOLUTION INTRAVENOUS ONCE
Status: CANCELLED | OUTPATIENT
Start: 2020-06-23 | End: 2020-06-23

## 2020-06-23 RX ADMIN — BUPIVACAINE HYDROCHLORIDE 60 ML: 2.5 INJECTION, SOLUTION EPIDURAL; INFILTRATION; INTRACAUDAL; PERINEURAL at 13:02

## 2020-06-23 RX ADMIN — SODIUM CHLORIDE, POTASSIUM CHLORIDE, SODIUM LACTATE AND CALCIUM CHLORIDE 150 ML/HR: 600; 310; 30; 20 INJECTION, SOLUTION INTRAVENOUS at 17:59

## 2020-06-23 RX ADMIN — HYDROMORPHONE HYDROCHLORIDE 0.5 MG: 1 INJECTION, SOLUTION INTRAMUSCULAR; INTRAVENOUS; SUBCUTANEOUS at 15:55

## 2020-06-23 RX ADMIN — LABETALOL HYDROCHLORIDE 10 MG: 5 INJECTION, SOLUTION INTRAVENOUS at 13:36

## 2020-06-23 RX ADMIN — SIMETHICONE 80 MG: 80 TABLET, CHEWABLE ORAL at 21:10

## 2020-06-23 RX ADMIN — HYDROMORPHONE HYDROCHLORIDE 2 MG: 2 TABLET ORAL at 19:30

## 2020-06-23 RX ADMIN — DEXAMETHASONE SODIUM PHOSPHATE 8 MG: 4 INJECTION, SOLUTION INTRAMUSCULAR; INTRAVENOUS at 12:45

## 2020-06-23 RX ADMIN — GABAPENTIN 100 MG: 100 CAPSULE ORAL at 21:01

## 2020-06-23 RX ADMIN — SODIUM CHLORIDE, POTASSIUM CHLORIDE, SODIUM LACTATE AND CALCIUM CHLORIDE: 600; 310; 30; 20 INJECTION, SOLUTION INTRAVENOUS at 13:37

## 2020-06-23 RX ADMIN — ROCURONIUM BROMIDE 10 MG: 10 INJECTION INTRAVENOUS at 14:07

## 2020-06-23 RX ADMIN — DEXAMETHASONE SODIUM PHOSPHATE 2 MG: 10 INJECTION INTRAMUSCULAR; INTRAVENOUS at 13:02

## 2020-06-23 RX ADMIN — METOCLOPRAMIDE 10 MG: 5 INJECTION, SOLUTION INTRAMUSCULAR; INTRAVENOUS at 17:17

## 2020-06-23 RX ADMIN — GABAPENTIN 600 MG: 300 CAPSULE ORAL at 09:59

## 2020-06-23 RX ADMIN — PANTOPRAZOLE SODIUM 40 MG: 40 INJECTION, POWDER, FOR SOLUTION INTRAVENOUS at 10:02

## 2020-06-23 RX ADMIN — ALPRAZOLAM 0.25 MG: 0.25 TABLET ORAL at 21:10

## 2020-06-23 RX ADMIN — LABETALOL 20 MG/4 ML (5 MG/ML) INTRAVENOUS SYRINGE 10 MG: at 20:08

## 2020-06-23 RX ADMIN — FENTANYL CITRATE 100 MCG: 50 INJECTION, SOLUTION INTRAMUSCULAR; INTRAVENOUS at 12:38

## 2020-06-23 RX ADMIN — PROPOFOL 200 MG: 10 INJECTION, EMULSION INTRAVENOUS at 12:38

## 2020-06-23 RX ADMIN — LIDOCAINE HYDROCHLORIDE 50 MG: 10 INJECTION, SOLUTION EPIDURAL; INFILTRATION; INTRACAUDAL; PERINEURAL at 12:38

## 2020-06-23 RX ADMIN — SODIUM CHLORIDE 150 ML/HR: 9 INJECTION, SOLUTION INTRAVENOUS at 10:05

## 2020-06-23 RX ADMIN — SODIUM CHLORIDE, POTASSIUM CHLORIDE, SODIUM LACTATE AND CALCIUM CHLORIDE: 600; 310; 30; 20 INJECTION, SOLUTION INTRAVENOUS at 12:35

## 2020-06-23 RX ADMIN — ROCURONIUM BROMIDE 50 MG: 10 INJECTION INTRAVENOUS at 12:38

## 2020-06-23 RX ADMIN — CEFAZOLIN SODIUM 2 G: 2 INJECTION, SOLUTION INTRAVENOUS at 21:01

## 2020-06-23 RX ADMIN — SIMETHICONE 80 MG: 80 TABLET, CHEWABLE ORAL at 15:42

## 2020-06-23 RX ADMIN — CHLORHEXIDINE GLUCONATE 0.12% ORAL RINSE 15 ML: 1.2 LIQUID ORAL at 10:01

## 2020-06-23 RX ADMIN — ONDANSETRON 4 MG: 2 INJECTION INTRAMUSCULAR; INTRAVENOUS at 14:47

## 2020-06-23 RX ADMIN — CHLORHEXIDINE GLUCONATE 0.12% ORAL RINSE 15 ML: 1.2 LIQUID ORAL at 10:05

## 2020-06-23 RX ADMIN — FENTANYL CITRATE 50 MCG: 50 INJECTION INTRAMUSCULAR; INTRAVENOUS at 15:25

## 2020-06-23 RX ADMIN — HYDROMORPHONE HYDROCHLORIDE 0.5 MG: 1 INJECTION, SOLUTION INTRAMUSCULAR; INTRAVENOUS; SUBCUTANEOUS at 15:43

## 2020-06-23 RX ADMIN — HYDROMORPHONE HYDROCHLORIDE 0.5 MG: 1 INJECTION, SOLUTION INTRAMUSCULAR; INTRAVENOUS; SUBCUTANEOUS at 17:17

## 2020-06-23 RX ADMIN — SODIUM CHLORIDE 1000 ML: 9 INJECTION, SOLUTION INTRAVENOUS at 09:37

## 2020-06-23 RX ADMIN — LIDOCAINE HYDROCHLORIDE 0.2 ML: 10 INJECTION, SOLUTION EPIDURAL; INFILTRATION; INTRACAUDAL; PERINEURAL at 09:37

## 2020-06-23 RX ADMIN — ONDANSETRON 4 MG: 2 INJECTION INTRAMUSCULAR; INTRAVENOUS at 15:25

## 2020-06-23 RX ADMIN — LABETALOL HYDROCHLORIDE 10 MG: 5 INJECTION, SOLUTION INTRAVENOUS at 13:27

## 2020-06-23 RX ADMIN — TRANEXAMIC ACID 1000 MG: 100 INJECTION, SOLUTION INTRAVENOUS at 13:59

## 2020-06-23 RX ADMIN — LABETALOL HYDROCHLORIDE 10 MG: 5 INJECTION, SOLUTION INTRAVENOUS at 13:19

## 2020-06-23 RX ADMIN — ACETAMINOPHEN 1000 MG: 500 TABLET ORAL at 09:59

## 2020-06-23 RX ADMIN — ONDANSETRON 4 MG: 2 INJECTION INTRAMUSCULAR; INTRAVENOUS at 21:01

## 2020-06-23 RX ADMIN — ACETAMINOPHEN 1000 MG: 500 TABLET, FILM COATED ORAL at 21:01

## 2020-06-23 RX ADMIN — METOPROLOL SUCCINATE 25 MG: 25 TABLET, EXTENDED RELEASE ORAL at 11:30

## 2020-06-23 RX ADMIN — BUPRENORPHINE HYDROCHLORIDE 0.3 MG: 0.32 INJECTION INTRAMUSCULAR; INTRAVENOUS at 13:02

## 2020-06-23 RX ADMIN — PROPOFOL 25 MCG/KG/MIN: 10 INJECTION, EMULSION INTRAVENOUS at 12:45

## 2020-06-23 RX ADMIN — FENTANYL CITRATE 50 MCG: 50 INJECTION INTRAMUSCULAR; INTRAVENOUS at 15:34

## 2020-06-23 NOTE — ANESTHESIA PREPROCEDURE EVALUATION
Anesthesia Evaluation     Patient summary reviewed and Nursing notes reviewed   NPO Solid Status: > 8 hours  NPO Liquid Status: > 2 hours           Airway   Mallampati: I  TM distance: >3 FB  Neck ROM: full  No difficulty expected  Dental      Pulmonary    (+) a smoker (quit 6 months ago ) Former, COPD (MDI ) moderate, asthma,shortness of breath (stable ),   (-) recent URI, no home oxygen  Cardiovascular     ECG reviewed  Patient on routine beta blocker    (+) hypertension, CAD, cardiac stents (2015 ) more than 12 months ago hyperlipidemia,   (-) past MI, dysrhythmias, angina    ROS comment: ECG NSR   Clearance Dr [facility] -Low risk    No testing availiable     Neuro/Psych  (+) psychiatric history,     (-) seizures, CVA    ROS Comment: SP HNP Bilat back pain and sx  GI/Hepatic/Renal/Endo    (+) morbid obesity, GERD,    (-) diabetes (was on metformin )    Musculoskeletal     (+) back pain, neck pain (sp neck and back sx),   Chronic pain: spinal cord stimulator.      ROS comment: SP L TKA  Abdominal    Substance History      OB/GYN          Other   arthritis,      ROS/Med Hx Other: plavix                  Anesthesia Plan    ASA 3     general with block   (TAPs   Higher risk due to CAD )  intravenous induction     Anesthetic plan, all risks, benefits, and alternatives have been provided, discussed and informed consent has been obtained with: patient.    Plan discussed with CRNA.

## 2020-06-23 NOTE — ANESTHESIA PROCEDURE NOTES
4 quad tap       Patient reassessed immediately prior to procedure    Patient location during procedure: OR  Reason for block: at surgeon's request and post-op pain management  Performed by  CRNA: Jack Lu CRNA  Preanesthetic Checklist  Completed: patient identified, site marked, surgical consent, pre-op evaluation, timeout performed, IV checked, risks and benefits discussed and monitors and equipment checked  Prep:  Pt Position: supine  Sterile barriers:cap, gloves, gown and mask  Prep: ChloraPrep  Patient monitoring: blood pressure monitoring, continuous pulse oximetry and EKG  Procedure  Performed under: general  Guidance:ultrasound guided and landmark technique  Images:still images obtained, printed/placed on chart    Laterality:Bilateral  Block Type:PECS I and PECS II  Injection Technique:single-shot  Needle Type:short-bevel  Needle Gauge:20 G  Resistance on Injection: none    Medications Used: buprenorphine (BUPRENEX) injection, 0.3 mg  dexamethasone sodium phosphate injection, 2 mg  bupivacaine PF (MARCAINE) 0.25 % injection, 60 mL      Medications  Preservative Free Saline:10ml  Comment:Block Injection:  Total volume of LA divided between Right and Left sided blocks         Post Assessment  Injection Assessment: negative aspiration for heme and incremental injection  Patient Tolerance:comfortable throughout block  Complications:no  Additional Notes  The pt. Was placed in the Supine Position and GA was induced     The insertion site was prepped with CHG and Ultrasound guidance with In-Plane techniquewas  a 4inch BBraun 360 degree echogenic needle was visualized.  Normal Saline PSF was  utilized for hydrodissection of tissue. PECS 1 Block- Pectoralis Major and Minor where identified and LA was injected between PMM and PmM at the level of the 3rd Rib(10ml),  PECS 2-  Pectoralis Minor and Serratus muscle where identified and the needle was advanced laterally in-plane with the 4th rib as a backstop, pleura  was monitored.  LA was injected between SA and PmM at the level of 4th rib( 20ml).  LA injection spread was visualized, injection was incremental 1-5ml, normal or low injection pressure, no intravascular injection, no pneumothorax appreciated.  Thank You.

## 2020-06-23 NOTE — ANESTHESIA POSTPROCEDURE EVALUATION
"Patient: Dipak Marinelli    Procedure Summary     Date:  06/23/20 Room / Location:   [facility] 02 /  [facility]    Anesthesia Start:  1235 Anesthesia Stop:      Procedures:       GASTRIC SLEEVE LAPAROSCOPIC (N/A Abdomen)      ESOPHAGOGASTRODUODENOSCOPY (N/A Esophagus)      PARAESOPHAGEAL HERNIA REPAIR LAPAROSCOPIC (N/A Abdomen) Diagnosis:       Obesity, Class III, BMI 40-49.9 (morbid obesity) (CMS/[facility])      (Obesity, Class III, BMI 40-49.9 (morbid obesity) (CMS/[facility]) [E66.01])    Surgeon:  Sonya Barr MD Provider:  Dom Ortiz MD    Anesthesia Type:  general with block ASA Status:  3          Anesthesia Type: general with block    Vitals  No vitals data found for the desired time range.          Post Anesthesia Care and Evaluation    Patient location during evaluation: PACU  Patient participation: complete - patient participated  Level of consciousness: awake and responsive to verbal stimuli  Pain score: 2  Pain management: adequate  Airway patency: patent  Anesthetic complications: No anesthetic complications    Cardiovascular status: acceptable  Respiratory status: acceptable  Hydration status: acceptable    Comments: Pt awake and responsive. SV. VSS. Report to RN. Patient Vitals in the past 24 hrs:  06/23/20 0940, BP:119/89, Temp:97 °F (36.1 °C), Temp src:Temporal, Pulse:90, Resp:18, SpO2:94 %, Height:176.5 cm (69.5\"), Weight:134 kg (296 lb 8 oz)  133/78. p 72. r 16. t 98.1                  "

## 2020-06-23 NOTE — ANESTHESIA PROCEDURE NOTES
Airway  Urgency: elective    Date/Time: 6/23/2020 12:40 PM  Airway not difficult    General Information and Staff    Patient location during procedure: OR  CRNA: Jack Lu CRNA    Indications and Patient Condition  Indications for airway management: airway protection    Preoxygenated: yes  MILS not maintained throughout  Mask difficulty assessment: 1 - vent by mask    Final Airway Details  Final airway type: endotracheal airway      Successful airway: ETT  Cuffed: yes   Successful intubation technique: video laryngoscopy  Facilitating devices/methods: intubating stylet  Endotracheal tube insertion site: oral  Blade: Tia  Blade size: 4  ETT size (mm): 7.5  Cormack-Lehane Classification: grade I - full view of glottis  Placement verified by: chest auscultation and capnometry   Measured from: lips  ETT/EBT  to lips (cm): 20  Number of attempts at approach: 1    Additional Comments  Negative epigastric sounds, Breath sound equal bilaterally with symmetric chest rise and fall

## 2020-06-24 ENCOUNTER — APPOINTMENT (OUTPATIENT)
Dept: GENERAL RADIOLOGY | Facility: HOSPITAL | Age: 55
End: 2020-06-24

## 2020-06-24 ENCOUNTER — APPOINTMENT (OUTPATIENT)
Dept: CT IMAGING | Facility: HOSPITAL | Age: 55
End: 2020-06-24

## 2020-06-24 PROBLEM — R55 NEAR SYNCOPE: Status: ACTIVE | Noted: 2020-06-24

## 2020-06-24 PROBLEM — R65.10 SIRS (SYSTEMIC INFLAMMATORY RESPONSE SYNDROME) (HCC): Status: ACTIVE | Noted: 2020-06-24

## 2020-06-24 PROBLEM — R73.03 PREDIABETES: Status: ACTIVE | Noted: 2020-06-24

## 2020-06-24 LAB
ABO GROUP BLD: NORMAL
ABO GROUP BLD: NORMAL
ALBUMIN SERPL-MCNC: 3.5 G/DL (ref 3.5–5.2)
ALBUMIN SERPL-MCNC: 3.9 G/DL (ref 3.5–5.2)
ALBUMIN/GLOB SERPL: 1.5 G/DL
ALBUMIN/GLOB SERPL: 1.6 G/DL
ALP SERPL-CCNC: 56 U/L (ref 39–117)
ALP SERPL-CCNC: 65 U/L (ref 39–117)
ALT SERPL W P-5'-P-CCNC: 73 U/L (ref 1–41)
ALT SERPL W P-5'-P-CCNC: 75 U/L (ref 1–41)
ANION GAP SERPL CALCULATED.3IONS-SCNC: 14 MMOL/L (ref 5–15)
ANION GAP SERPL CALCULATED.3IONS-SCNC: 9 MMOL/L (ref 5–15)
AST SERPL-CCNC: 52 U/L (ref 1–40)
AST SERPL-CCNC: 53 U/L (ref 1–40)
BASOPHILS # BLD AUTO: 0.02 10*3/MM3 (ref 0–0.2)
BASOPHILS # BLD AUTO: 0.04 10*3/MM3 (ref 0–0.2)
BASOPHILS NFR BLD AUTO: 0.1 % (ref 0–1.5)
BASOPHILS NFR BLD AUTO: 0.2 % (ref 0–1.5)
BILIRUB SERPL-MCNC: 0.4 MG/DL (ref 0.2–1.2)
BILIRUB SERPL-MCNC: 0.5 MG/DL (ref 0.2–1.2)
BLD GP AB SCN SERPL QL: NEGATIVE
BUN BLD-MCNC: 11 MG/DL (ref 6–20)
BUN BLD-MCNC: 9 MG/DL (ref 6–20)
BUN/CREAT SERPL: 12.7 (ref 7–25)
BUN/CREAT SERPL: 13.1 (ref 7–25)
CALCIUM SPEC-SCNC: 8.1 MG/DL (ref 8.6–10.5)
CALCIUM SPEC-SCNC: 8.6 MG/DL (ref 8.6–10.5)
CHLORIDE SERPL-SCNC: 102 MMOL/L (ref 98–107)
CHLORIDE SERPL-SCNC: 99 MMOL/L (ref 98–107)
CO2 SERPL-SCNC: 20 MMOL/L (ref 22–29)
CO2 SERPL-SCNC: 25 MMOL/L (ref 22–29)
CREAT BLD-MCNC: 0.71 MG/DL (ref 0.76–1.27)
CREAT BLD-MCNC: 0.84 MG/DL (ref 0.76–1.27)
D-LACTATE SERPL-SCNC: 3.1 MMOL/L (ref 0.5–2)
DEPRECATED RDW RBC AUTO: 44.2 FL (ref 37–54)
DEPRECATED RDW RBC AUTO: 44.2 FL (ref 37–54)
EOSINOPHIL # BLD AUTO: 0 10*3/MM3 (ref 0–0.4)
EOSINOPHIL # BLD AUTO: 0.02 10*3/MM3 (ref 0–0.4)
EOSINOPHIL NFR BLD AUTO: 0 % (ref 0.3–6.2)
EOSINOPHIL NFR BLD AUTO: 0.1 % (ref 0.3–6.2)
ERYTHROCYTE [DISTWIDTH] IN BLOOD BY AUTOMATED COUNT: 13.4 % (ref 12.3–15.4)
ERYTHROCYTE [DISTWIDTH] IN BLOOD BY AUTOMATED COUNT: 13.7 % (ref 12.3–15.4)
GFR SERPL CREATININE-BSD FRML MDRD: 116 ML/MIN/1.73
GFR SERPL CREATININE-BSD FRML MDRD: 95 ML/MIN/1.73
GLOBULIN UR ELPH-MCNC: 2.3 GM/DL
GLOBULIN UR ELPH-MCNC: 2.4 GM/DL
GLUCOSE BLD-MCNC: 163 MG/DL (ref 65–99)
GLUCOSE BLD-MCNC: 228 MG/DL (ref 65–99)
GLUCOSE BLDC GLUCOMTR-MCNC: 232 MG/DL (ref 70–130)
HCT VFR BLD AUTO: 38.3 % (ref 37.5–51)
HCT VFR BLD AUTO: 42.9 % (ref 37.5–51)
HGB BLD-MCNC: 12.3 G/DL (ref 13–17.7)
HGB BLD-MCNC: 13.6 G/DL (ref 13–17.7)
IMM GRANULOCYTES # BLD AUTO: 0.11 10*3/MM3 (ref 0–0.05)
IMM GRANULOCYTES # BLD AUTO: 0.11 10*3/MM3 (ref 0–0.05)
IMM GRANULOCYTES NFR BLD AUTO: 0.5 % (ref 0–0.5)
IMM GRANULOCYTES NFR BLD AUTO: 0.6 % (ref 0–0.5)
IRON 24H UR-MRATE: 53 MCG/DL (ref 59–158)
LACTATE HOLD SPECIMEN: NORMAL
LYMPHOCYTES # BLD AUTO: 2.08 10*3/MM3 (ref 0.7–3.1)
LYMPHOCYTES # BLD AUTO: 4.97 10*3/MM3 (ref 0.7–3.1)
LYMPHOCYTES NFR BLD AUTO: 11.1 % (ref 19.6–45.3)
LYMPHOCYTES NFR BLD AUTO: 23.8 % (ref 19.6–45.3)
MCH RBC QN AUTO: 28.5 PG (ref 26.6–33)
MCH RBC QN AUTO: 28.9 PG (ref 26.6–33)
MCHC RBC AUTO-ENTMCNC: 31.7 G/DL (ref 31.5–35.7)
MCHC RBC AUTO-ENTMCNC: 32.1 G/DL (ref 31.5–35.7)
MCV RBC AUTO: 89.9 FL (ref 79–97)
MCV RBC AUTO: 89.9 FL (ref 79–97)
MONOCYTES # BLD AUTO: 1.32 10*3/MM3 (ref 0.1–0.9)
MONOCYTES # BLD AUTO: 1.84 10*3/MM3 (ref 0.1–0.9)
MONOCYTES NFR BLD AUTO: 7.1 % (ref 5–12)
MONOCYTES NFR BLD AUTO: 8.8 % (ref 5–12)
NEUTROPHILS # BLD AUTO: 13.89 10*3/MM3 (ref 1.7–7)
NEUTROPHILS # BLD AUTO: 15.18 10*3/MM3 (ref 1.7–7)
NEUTROPHILS NFR BLD AUTO: 66.6 % (ref 42.7–76)
NEUTROPHILS NFR BLD AUTO: 81.1 % (ref 42.7–76)
NRBC BLD AUTO-RTO: 0 /100 WBC (ref 0–0.2)
NRBC BLD AUTO-RTO: 0 /100 WBC (ref 0–0.2)
PLATELET # BLD AUTO: 223 10*3/MM3 (ref 140–450)
PLATELET # BLD AUTO: 301 10*3/MM3 (ref 140–450)
PMV BLD AUTO: 9.4 FL (ref 6–12)
PMV BLD AUTO: 9.7 FL (ref 6–12)
POTASSIUM BLD-SCNC: 4.3 MMOL/L (ref 3.5–5.2)
POTASSIUM BLD-SCNC: 4.3 MMOL/L (ref 3.5–5.2)
PROT SERPL-MCNC: 5.8 G/DL (ref 6–8.5)
PROT SERPL-MCNC: 6.3 G/DL (ref 6–8.5)
RBC # BLD AUTO: 4.26 10*6/MM3 (ref 4.14–5.8)
RBC # BLD AUTO: 4.77 10*6/MM3 (ref 4.14–5.8)
RH BLD: NEGATIVE
RH BLD: NEGATIVE
SODIUM BLD-SCNC: 133 MMOL/L (ref 136–145)
SODIUM BLD-SCNC: 136 MMOL/L (ref 136–145)
T&S EXPIRATION DATE: NORMAL
TROPONIN T SERPL-MCNC: <0.01 NG/ML (ref 0–0.03)
TROPONIN T SERPL-MCNC: <0.01 NG/ML (ref 0–0.03)
WBC NRBC COR # BLD: 18.71 10*3/MM3 (ref 3.4–10.8)
WBC NRBC COR # BLD: 20.87 10*3/MM3 (ref 3.4–10.8)

## 2020-06-24 PROCEDURE — 74018 RADEX ABDOMEN 1 VIEW: CPT

## 2020-06-24 PROCEDURE — 25010000002 METOCLOPRAMIDE PER 10 MG: Performed by: SURGERY

## 2020-06-24 PROCEDURE — 82962 GLUCOSE BLOOD TEST: CPT

## 2020-06-24 PROCEDURE — 0 IOPAMIDOL PER 1 ML: Performed by: SURGERY

## 2020-06-24 PROCEDURE — 93005 ELECTROCARDIOGRAM TRACING: CPT | Performed by: FAMILY MEDICINE

## 2020-06-24 PROCEDURE — 83605 ASSAY OF LACTIC ACID: CPT | Performed by: FAMILY MEDICINE

## 2020-06-24 PROCEDURE — 25010000002 CYANOCOBALAMIN PER 1000 MCG: Performed by: SURGERY

## 2020-06-24 PROCEDURE — 84484 ASSAY OF TROPONIN QUANT: CPT | Performed by: SURGERY

## 2020-06-24 PROCEDURE — 80053 COMPREHEN METABOLIC PANEL: CPT | Performed by: SURGERY

## 2020-06-24 PROCEDURE — 83540 ASSAY OF IRON: CPT | Performed by: SURGERY

## 2020-06-24 PROCEDURE — 25010000002 PROMETHAZINE PER 50 MG: Performed by: SURGERY

## 2020-06-24 PROCEDURE — 86923 COMPATIBILITY TEST ELECTRIC: CPT

## 2020-06-24 PROCEDURE — 93010 ELECTROCARDIOGRAM REPORT: CPT | Performed by: INTERNAL MEDICINE

## 2020-06-24 PROCEDURE — 0 DIATRIZOATE MEGLUMINE & SODIUM PER 1 ML: Performed by: SURGERY

## 2020-06-24 PROCEDURE — 25010000002 HYDROMORPHONE PER 4 MG: Performed by: SURGERY

## 2020-06-24 PROCEDURE — 71275 CT ANGIOGRAPHY CHEST: CPT

## 2020-06-24 PROCEDURE — 25010000002 ONDANSETRON PER 1 MG: Performed by: SURGERY

## 2020-06-24 PROCEDURE — 86900 BLOOD TYPING SEROLOGIC ABO: CPT | Performed by: SURGERY

## 2020-06-24 PROCEDURE — 25010000003 POTASSIUM CHLORIDE PER 2 MEQ: Performed by: SURGERY

## 2020-06-24 PROCEDURE — 86850 RBC ANTIBODY SCREEN: CPT | Performed by: SURGERY

## 2020-06-24 PROCEDURE — 86900 BLOOD TYPING SEROLOGIC ABO: CPT

## 2020-06-24 PROCEDURE — 86901 BLOOD TYPING SEROLOGIC RH(D): CPT

## 2020-06-24 PROCEDURE — 87040 BLOOD CULTURE FOR BACTERIA: CPT | Performed by: FAMILY MEDICINE

## 2020-06-24 PROCEDURE — 25010000002 ENOXAPARIN PER 10 MG: Performed by: SURGERY

## 2020-06-24 PROCEDURE — 25010000002 THIAMINE PER 100 MG: Performed by: SURGERY

## 2020-06-24 PROCEDURE — 85025 COMPLETE CBC W/AUTO DIFF WBC: CPT | Performed by: SURGERY

## 2020-06-24 PROCEDURE — 86901 BLOOD TYPING SEROLOGIC RH(D): CPT | Performed by: SURGERY

## 2020-06-24 PROCEDURE — 99222 1ST HOSP IP/OBS MODERATE 55: CPT | Performed by: FAMILY MEDICINE

## 2020-06-24 PROCEDURE — 84484 ASSAY OF TROPONIN QUANT: CPT | Performed by: FAMILY MEDICINE

## 2020-06-24 PROCEDURE — 74240 X-RAY XM UPR GI TRC 1CNTRST: CPT

## 2020-06-24 PROCEDURE — 93005 ELECTROCARDIOGRAM TRACING: CPT | Performed by: SURGERY

## 2020-06-24 PROCEDURE — 99024 POSTOP FOLLOW-UP VISIT: CPT | Performed by: SURGERY

## 2020-06-24 PROCEDURE — 63710000001 PROCHLORPERAZINE MALEATE PER 10 MG: Performed by: SURGERY

## 2020-06-24 PROCEDURE — 25010000003 CEFAZOLIN IN DEXTROSE 2-4 GM/100ML-% SOLUTION: Performed by: SURGERY

## 2020-06-24 PROCEDURE — 74176 CT ABD & PELVIS W/O CONTRAST: CPT

## 2020-06-24 RX ORDER — NICOTINE POLACRILEX 4 MG
15 LOZENGE BUCCAL
Status: DISCONTINUED | OUTPATIENT
Start: 2020-06-24 | End: 2020-06-27 | Stop reason: HOSPADM

## 2020-06-24 RX ORDER — CYCLOBENZAPRINE HCL 10 MG
5 TABLET ORAL EVERY 8 HOURS PRN
Status: DISCONTINUED | OUTPATIENT
Start: 2020-06-24 | End: 2020-06-27 | Stop reason: HOSPADM

## 2020-06-24 RX ORDER — BISACODYL 10 MG
10 SUPPOSITORY, RECTAL RECTAL ONCE
Status: DISCONTINUED | OUTPATIENT
Start: 2020-06-24 | End: 2020-06-27 | Stop reason: HOSPADM

## 2020-06-24 RX ORDER — DEXTROSE MONOHYDRATE 25 G/50ML
25 INJECTION, SOLUTION INTRAVENOUS
Status: DISCONTINUED | OUTPATIENT
Start: 2020-06-24 | End: 2020-06-27 | Stop reason: HOSPADM

## 2020-06-24 RX ORDER — SODIUM CHLORIDE, SODIUM LACTATE, POTASSIUM CHLORIDE, CALCIUM CHLORIDE 600; 310; 30; 20 MG/100ML; MG/100ML; MG/100ML; MG/100ML
100 INJECTION, SOLUTION INTRAVENOUS CONTINUOUS
Status: ACTIVE | OUTPATIENT
Start: 2020-06-24 | End: 2020-06-25

## 2020-06-24 RX ADMIN — OXYCODONE 5 MG: 5 TABLET ORAL at 05:25

## 2020-06-24 RX ADMIN — FLUOXETINE HYDROCHLORIDE 60 MG: 20 CAPSULE ORAL at 08:34

## 2020-06-24 RX ADMIN — CETIRIZINE HYDROCHLORIDE 10 MG: 10 TABLET, FILM COATED ORAL at 08:35

## 2020-06-24 RX ADMIN — SODIUM CHLORIDE, POTASSIUM CHLORIDE, SODIUM LACTATE AND CALCIUM CHLORIDE 100 ML/HR: 600; 310; 30; 20 INJECTION, SOLUTION INTRAVENOUS at 21:26

## 2020-06-24 RX ADMIN — PROMETHAZINE HYDROCHLORIDE 12.5 MG: 25 INJECTION INTRAMUSCULAR; INTRAVENOUS at 21:31

## 2020-06-24 RX ADMIN — SIMETHICONE 80 MG: 80 TABLET, CHEWABLE ORAL at 13:26

## 2020-06-24 RX ADMIN — GABAPENTIN 100 MG: 100 CAPSULE ORAL at 08:34

## 2020-06-24 RX ADMIN — PANTOPRAZOLE SODIUM 40 MG: 40 TABLET, DELAYED RELEASE ORAL at 05:25

## 2020-06-24 RX ADMIN — HYDROMORPHONE HYDROCHLORIDE 0.5 MG: 1 INJECTION, SOLUTION INTRAMUSCULAR; INTRAVENOUS; SUBCUTANEOUS at 18:47

## 2020-06-24 RX ADMIN — POTASSIUM CHLORIDE AND SODIUM CHLORIDE 100 ML/HR: 450; 150 INJECTION, SOLUTION INTRAVENOUS at 18:39

## 2020-06-24 RX ADMIN — METOCLOPRAMIDE 10 MG: 5 INJECTION, SOLUTION INTRAMUSCULAR; INTRAVENOUS at 01:33

## 2020-06-24 RX ADMIN — HYDROMORPHONE HYDROCHLORIDE 2 MG: 2 TABLET ORAL at 00:59

## 2020-06-24 RX ADMIN — FOLIC ACID 250 ML/HR: 5 INJECTION, SOLUTION INTRAMUSCULAR; INTRAVENOUS; SUBCUTANEOUS at 05:26

## 2020-06-24 RX ADMIN — CEFAZOLIN SODIUM 2 G: 2 INJECTION, SOLUTION INTRAVENOUS at 05:25

## 2020-06-24 RX ADMIN — ENOXAPARIN SODIUM 40 MG: 40 INJECTION SUBCUTANEOUS at 08:35

## 2020-06-24 RX ADMIN — GABAPENTIN 100 MG: 100 CAPSULE ORAL at 21:25

## 2020-06-24 RX ADMIN — ACETAMINOPHEN 1000 MG: 500 TABLET, FILM COATED ORAL at 21:24

## 2020-06-24 RX ADMIN — AMLODIPINE BESYLATE 5 MG: 5 TABLET ORAL at 08:35

## 2020-06-24 RX ADMIN — ONDANSETRON 4 MG: 2 INJECTION INTRAMUSCULAR; INTRAVENOUS at 02:59

## 2020-06-24 RX ADMIN — HYDROMORPHONE HYDROCHLORIDE 2 MG: 2 TABLET ORAL at 14:39

## 2020-06-24 RX ADMIN — ACETAMINOPHEN 1000 MG: 500 TABLET, FILM COATED ORAL at 05:25

## 2020-06-24 RX ADMIN — ONDANSETRON 4 MG: 2 INJECTION INTRAMUSCULAR; INTRAVENOUS at 18:46

## 2020-06-24 RX ADMIN — IOPAMIDOL 85 ML: 755 INJECTION, SOLUTION INTRAVENOUS at 19:30

## 2020-06-24 RX ADMIN — CYANOCOBALAMIN 1000 MCG: 1000 INJECTION, SOLUTION INTRAMUSCULAR; SUBCUTANEOUS at 08:34

## 2020-06-24 RX ADMIN — OXYCODONE 5 MG: 5 TABLET ORAL at 19:47

## 2020-06-24 RX ADMIN — PROCHLORPERAZINE MALEATE 10 MG: 10 TABLET ORAL at 13:17

## 2020-06-24 RX ADMIN — ALPRAZOLAM 0.25 MG: 0.25 TABLET ORAL at 21:24

## 2020-06-24 RX ADMIN — METOPROLOL SUCCINATE 25 MG: 25 TABLET, EXTENDED RELEASE ORAL at 08:35

## 2020-06-24 RX ADMIN — OXYCODONE 5 MG: 5 TABLET ORAL at 13:17

## 2020-06-24 RX ADMIN — SODIUM CHLORIDE, PRESERVATIVE FREE 3 ML: 5 INJECTION INTRAVENOUS at 21:24

## 2020-06-24 RX ADMIN — HYDROMORPHONE HYDROCHLORIDE 0.5 MG: 1 INJECTION, SOLUTION INTRAMUSCULAR; INTRAVENOUS; SUBCUTANEOUS at 21:24

## 2020-06-24 RX ADMIN — LABETALOL 20 MG/4 ML (5 MG/ML) INTRAVENOUS SYRINGE 10 MG: at 14:38

## 2020-06-24 RX ADMIN — Medication 30 ML: at 10:00

## 2020-06-24 RX ADMIN — ONDANSETRON 4 MG: 2 INJECTION INTRAMUSCULAR; INTRAVENOUS at 08:34

## 2020-06-24 RX ADMIN — ROSUVASTATIN CALCIUM 10 MG: 10 TABLET, COATED ORAL at 08:35

## 2020-06-25 ENCOUNTER — ANESTHESIA (OUTPATIENT)
Dept: PERIOP | Facility: HOSPITAL | Age: 55
End: 2020-06-25

## 2020-06-25 ENCOUNTER — ANESTHESIA EVENT (OUTPATIENT)
Dept: PERIOP | Facility: HOSPITAL | Age: 55
End: 2020-06-25

## 2020-06-25 PROBLEM — D62 POSTOPERATIVE ANEMIA DUE TO ACUTE BLOOD LOSS: Status: ACTIVE | Noted: 2020-06-25

## 2020-06-25 PROBLEM — S30.1XXA ABDOMINAL HEMATOMA: Status: ACTIVE | Noted: 2020-06-25

## 2020-06-25 LAB
ALBUMIN SERPL-MCNC: 3.3 G/DL (ref 3.5–5.2)
ALBUMIN/GLOB SERPL: 1.4 G/DL
ALP SERPL-CCNC: 54 U/L (ref 39–117)
ALT SERPL W P-5'-P-CCNC: 59 U/L (ref 1–41)
ANION GAP SERPL CALCULATED.3IONS-SCNC: 12 MMOL/L (ref 5–15)
AST SERPL-CCNC: 29 U/L (ref 1–40)
BACTERIA UR QL AUTO: ABNORMAL /HPF
BASOPHILS # BLD AUTO: 0.06 10*3/MM3 (ref 0–0.2)
BASOPHILS NFR BLD AUTO: 0.3 % (ref 0–1.5)
BILIRUB SERPL-MCNC: 0.4 MG/DL (ref 0.2–1.2)
BILIRUB UR QL STRIP: NEGATIVE
BUN BLD-MCNC: 21 MG/DL (ref 6–20)
BUN/CREAT SERPL: 22.3 (ref 7–25)
CALCIUM SPEC-SCNC: 8.1 MG/DL (ref 8.6–10.5)
CHLORIDE SERPL-SCNC: 103 MMOL/L (ref 98–107)
CLARITY UR: ABNORMAL
CO2 SERPL-SCNC: 19 MMOL/L (ref 22–29)
COLOR UR: YELLOW
CREAT BLD-MCNC: 0.94 MG/DL (ref 0.76–1.27)
CYTO UR: NORMAL
D-LACTATE SERPL-SCNC: 5.1 MMOL/L (ref 0.5–2)
DEPRECATED RDW RBC AUTO: 50 FL (ref 37–54)
EOSINOPHIL # BLD AUTO: 0.1 10*3/MM3 (ref 0–0.4)
EOSINOPHIL NFR BLD AUTO: 0.6 % (ref 0.3–6.2)
ERYTHROCYTE [DISTWIDTH] IN BLOOD BY AUTOMATED COUNT: 13.9 % (ref 12.3–15.4)
FINE GRAN CASTS URNS QL MICRO: ABNORMAL /LPF
GFR SERPL CREATININE-BSD FRML MDRD: 84 ML/MIN/1.73
GLOBULIN UR ELPH-MCNC: 2.3 GM/DL
GLUCOSE BLD-MCNC: 145 MG/DL (ref 65–99)
GLUCOSE BLDC GLUCOMTR-MCNC: 129 MG/DL (ref 70–130)
GLUCOSE BLDC GLUCOMTR-MCNC: 169 MG/DL (ref 70–130)
GLUCOSE BLDC GLUCOMTR-MCNC: 174 MG/DL (ref 70–130)
GLUCOSE BLDC GLUCOMTR-MCNC: 185 MG/DL (ref 70–130)
GLUCOSE UR STRIP-MCNC: NEGATIVE MG/DL
HCT VFR BLD AUTO: 26.7 % (ref 37.5–51)
HCT VFR BLD AUTO: 33.3 % (ref 37.5–51)
HGB BLD-MCNC: 11.2 G/DL (ref 13–17.7)
HGB BLD-MCNC: 9 G/DL (ref 13–17.7)
HGB BLD-MCNC: 9.9 G/DL (ref 13–17.7)
HGB UR QL STRIP.AUTO: NEGATIVE
HYALINE CASTS UR QL AUTO: ABNORMAL /LPF
IMM GRANULOCYTES # BLD AUTO: 0.09 10*3/MM3 (ref 0–0.05)
IMM GRANULOCYTES NFR BLD AUTO: 0.5 % (ref 0–0.5)
KETONES UR QL STRIP: NEGATIVE
LAB AP CASE REPORT: NORMAL
LAB AP CLINICAL INFORMATION: NORMAL
LAB AP DIAGNOSIS COMMENT: NORMAL
LEUKOCYTE ESTERASE UR QL STRIP.AUTO: NEGATIVE
LYMPHOCYTES # BLD AUTO: 3.87 10*3/MM3 (ref 0.7–3.1)
LYMPHOCYTES NFR BLD AUTO: 22.2 % (ref 19.6–45.3)
MCH RBC QN AUTO: 29.1 PG (ref 26.6–33)
MCHC RBC AUTO-ENTMCNC: 29.7 G/DL (ref 31.5–35.7)
MCV RBC AUTO: 97.9 FL (ref 79–97)
MONOCYTES # BLD AUTO: 1.84 10*3/MM3 (ref 0.1–0.9)
MONOCYTES NFR BLD AUTO: 10.6 % (ref 5–12)
NEUTROPHILS # BLD AUTO: 11.44 10*3/MM3 (ref 1.7–7)
NEUTROPHILS NFR BLD AUTO: 65.8 % (ref 42.7–76)
NITRITE UR QL STRIP: NEGATIVE
NRBC BLD AUTO-RTO: 0 /100 WBC (ref 0–0.2)
PATH REPORT.FINAL DX SPEC: NORMAL
PATH REPORT.GROSS SPEC: NORMAL
PH UR STRIP.AUTO: <=5 [PH] (ref 5–8)
PLATELET # BLD AUTO: 221 10*3/MM3 (ref 140–450)
PMV BLD AUTO: 9.7 FL (ref 6–12)
POTASSIUM BLD-SCNC: 4.3 MMOL/L (ref 3.5–5.2)
PROT SERPL-MCNC: 5.6 G/DL (ref 6–8.5)
PROT UR QL STRIP: ABNORMAL
RBC # BLD AUTO: 3.4 10*6/MM3 (ref 4.14–5.8)
RBC # UR: ABNORMAL /HPF
REF LAB TEST METHOD: ABNORMAL
SODIUM BLD-SCNC: 134 MMOL/L (ref 136–145)
SP GR UR STRIP: 1.05 (ref 1–1.03)
SQUAMOUS #/AREA URNS HPF: ABNORMAL /HPF
TROPONIN T SERPL-MCNC: <0.01 NG/ML (ref 0–0.03)
TROPONIN T SERPL-MCNC: <0.01 NG/ML (ref 0–0.03)
UROBILINOGEN UR QL STRIP: ABNORMAL
WBC NRBC COR # BLD: 17.4 10*3/MM3 (ref 3.4–10.8)
WBC UR QL AUTO: ABNORMAL /HPF

## 2020-06-25 PROCEDURE — 25010000002 SUCCINYLCHOLINE PER 20 MG: Performed by: NURSE ANESTHETIST, CERTIFIED REGISTERED

## 2020-06-25 PROCEDURE — 49322 LAPAROSCOPY ASPIRATION: CPT | Performed by: SURGERY

## 2020-06-25 PROCEDURE — 85025 COMPLETE CBC W/AUTO DIFF WBC: CPT | Performed by: SURGERY

## 2020-06-25 PROCEDURE — 82962 GLUCOSE BLOOD TEST: CPT

## 2020-06-25 PROCEDURE — 25010000002 HYDROMORPHONE PER 4 MG: Performed by: SURGERY

## 2020-06-25 PROCEDURE — 25010000002 NEOSTIGMINE 10 MG/10ML SOLUTION: Performed by: NURSE ANESTHETIST, CERTIFIED REGISTERED

## 2020-06-25 PROCEDURE — 25010000002 HYDROMORPHONE PER 4 MG: Performed by: NURSE ANESTHETIST, CERTIFIED REGISTERED

## 2020-06-25 PROCEDURE — 25010000002 FENTANYL CITRATE (PF) 100 MCG/2ML SOLUTION: Performed by: NURSE ANESTHETIST, CERTIFIED REGISTERED

## 2020-06-25 PROCEDURE — 83605 ASSAY OF LACTIC ACID: CPT | Performed by: FAMILY MEDICINE

## 2020-06-25 PROCEDURE — 99024 POSTOP FOLLOW-UP VISIT: CPT | Performed by: SURGERY

## 2020-06-25 PROCEDURE — 84484 ASSAY OF TROPONIN QUANT: CPT | Performed by: SURGERY

## 2020-06-25 PROCEDURE — 99233 SBSQ HOSP IP/OBS HIGH 50: CPT | Performed by: FAMILY MEDICINE

## 2020-06-25 PROCEDURE — 25010000002 PROMETHAZINE PER 50 MG: Performed by: SURGERY

## 2020-06-25 PROCEDURE — 81001 URINALYSIS AUTO W/SCOPE: CPT | Performed by: FAMILY MEDICINE

## 2020-06-25 PROCEDURE — 25010000002 ONDANSETRON PER 1 MG: Performed by: NURSE ANESTHETIST, CERTIFIED REGISTERED

## 2020-06-25 PROCEDURE — 25010000002 DEXAMETHASONE PER 1 MG: Performed by: NURSE ANESTHETIST, CERTIFIED REGISTERED

## 2020-06-25 PROCEDURE — 25010000002 PROPOFOL 10 MG/ML EMULSION: Performed by: NURSE ANESTHETIST, CERTIFIED REGISTERED

## 2020-06-25 PROCEDURE — 80053 COMPREHEN METABOLIC PANEL: CPT | Performed by: SURGERY

## 2020-06-25 PROCEDURE — 85018 HEMOGLOBIN: CPT | Performed by: SURGERY

## 2020-06-25 PROCEDURE — 25010000003 CEFAZOLIN IN DEXTROSE 2-4 GM/100ML-% SOLUTION: Performed by: SURGERY

## 2020-06-25 PROCEDURE — 85014 HEMATOCRIT: CPT | Performed by: FAMILY MEDICINE

## 2020-06-25 PROCEDURE — 85018 HEMOGLOBIN: CPT | Performed by: FAMILY MEDICINE

## 2020-06-25 RX ORDER — FENTANYL CITRATE 50 UG/ML
50 INJECTION, SOLUTION INTRAMUSCULAR; INTRAVENOUS
Status: DISCONTINUED | OUTPATIENT
Start: 2020-06-25 | End: 2020-06-25 | Stop reason: HOSPADM

## 2020-06-25 RX ORDER — PROMETHAZINE HYDROCHLORIDE 25 MG/ML
6.25 INJECTION, SOLUTION INTRAMUSCULAR; INTRAVENOUS ONCE AS NEEDED
Status: DISCONTINUED | OUTPATIENT
Start: 2020-06-25 | End: 2020-06-25 | Stop reason: HOSPADM

## 2020-06-25 RX ORDER — HYDROMORPHONE HYDROCHLORIDE 1 MG/ML
0.5 INJECTION, SOLUTION INTRAMUSCULAR; INTRAVENOUS; SUBCUTANEOUS
Status: DISCONTINUED | OUTPATIENT
Start: 2020-06-25 | End: 2020-06-25 | Stop reason: HOSPADM

## 2020-06-25 RX ORDER — NALOXONE HCL 0.4 MG/ML
0.4 VIAL (ML) INJECTION AS NEEDED
Status: DISCONTINUED | OUTPATIENT
Start: 2020-06-25 | End: 2020-06-25 | Stop reason: HOSPADM

## 2020-06-25 RX ORDER — MEPERIDINE HYDROCHLORIDE 25 MG/ML
12.5 INJECTION INTRAMUSCULAR; INTRAVENOUS; SUBCUTANEOUS
Status: DISCONTINUED | OUTPATIENT
Start: 2020-06-25 | End: 2020-06-25 | Stop reason: HOSPADM

## 2020-06-25 RX ORDER — LIDOCAINE HYDROCHLORIDE 10 MG/ML
INJECTION, SOLUTION EPIDURAL; INFILTRATION; INTRACAUDAL; PERINEURAL AS NEEDED
Status: DISCONTINUED | OUTPATIENT
Start: 2020-06-25 | End: 2020-06-25 | Stop reason: SURG

## 2020-06-25 RX ORDER — ROCURONIUM BROMIDE 10 MG/ML
INJECTION, SOLUTION INTRAVENOUS AS NEEDED
Status: DISCONTINUED | OUTPATIENT
Start: 2020-06-25 | End: 2020-06-25 | Stop reason: SURG

## 2020-06-25 RX ORDER — SODIUM CHLORIDE 9 MG/ML
125 INJECTION, SOLUTION INTRAVENOUS CONTINUOUS
Status: DISCONTINUED | OUTPATIENT
Start: 2020-06-25 | End: 2020-06-26

## 2020-06-25 RX ORDER — PROMETHAZINE HYDROCHLORIDE 25 MG/1
25 SUPPOSITORY RECTAL ONCE AS NEEDED
Status: DISCONTINUED | OUTPATIENT
Start: 2020-06-25 | End: 2020-06-25 | Stop reason: HOSPADM

## 2020-06-25 RX ORDER — ONDANSETRON 2 MG/ML
4 INJECTION INTRAMUSCULAR; INTRAVENOUS ONCE AS NEEDED
Status: DISCONTINUED | OUTPATIENT
Start: 2020-06-25 | End: 2020-06-25 | Stop reason: HOSPADM

## 2020-06-25 RX ORDER — HYDRALAZINE HYDROCHLORIDE 20 MG/ML
5 INJECTION INTRAMUSCULAR; INTRAVENOUS
Status: DISCONTINUED | OUTPATIENT
Start: 2020-06-25 | End: 2020-06-25 | Stop reason: HOSPADM

## 2020-06-25 RX ORDER — NEOSTIGMINE METHYLSULFATE 1 MG/ML
INJECTION, SOLUTION INTRAVENOUS AS NEEDED
Status: DISCONTINUED | OUTPATIENT
Start: 2020-06-25 | End: 2020-06-25 | Stop reason: SURG

## 2020-06-25 RX ORDER — LABETALOL HYDROCHLORIDE 5 MG/ML
5 INJECTION, SOLUTION INTRAVENOUS
Status: DISCONTINUED | OUTPATIENT
Start: 2020-06-25 | End: 2020-06-25 | Stop reason: HOSPADM

## 2020-06-25 RX ORDER — DEXAMETHASONE SODIUM PHOSPHATE 4 MG/ML
INJECTION, SOLUTION INTRA-ARTICULAR; INTRALESIONAL; INTRAMUSCULAR; INTRAVENOUS; SOFT TISSUE AS NEEDED
Status: DISCONTINUED | OUTPATIENT
Start: 2020-06-25 | End: 2020-06-25 | Stop reason: SURG

## 2020-06-25 RX ORDER — CEFAZOLIN SODIUM 2 G/100ML
2 INJECTION, SOLUTION INTRAVENOUS
Status: COMPLETED | OUTPATIENT
Start: 2020-06-25 | End: 2020-06-25

## 2020-06-25 RX ORDER — ONDANSETRON 2 MG/ML
INJECTION INTRAMUSCULAR; INTRAVENOUS AS NEEDED
Status: DISCONTINUED | OUTPATIENT
Start: 2020-06-25 | End: 2020-06-25 | Stop reason: SURG

## 2020-06-25 RX ORDER — PROPOFOL 10 MG/ML
VIAL (ML) INTRAVENOUS AS NEEDED
Status: DISCONTINUED | OUTPATIENT
Start: 2020-06-25 | End: 2020-06-25 | Stop reason: SURG

## 2020-06-25 RX ORDER — IPRATROPIUM BROMIDE AND ALBUTEROL SULFATE 2.5; .5 MG/3ML; MG/3ML
3 SOLUTION RESPIRATORY (INHALATION) ONCE AS NEEDED
Status: DISCONTINUED | OUTPATIENT
Start: 2020-06-25 | End: 2020-06-25 | Stop reason: HOSPADM

## 2020-06-25 RX ORDER — FENTANYL CITRATE 50 UG/ML
INJECTION, SOLUTION INTRAMUSCULAR; INTRAVENOUS AS NEEDED
Status: DISCONTINUED | OUTPATIENT
Start: 2020-06-25 | End: 2020-06-25 | Stop reason: SURG

## 2020-06-25 RX ORDER — PROMETHAZINE HYDROCHLORIDE 25 MG/1
25 TABLET ORAL ONCE AS NEEDED
Status: DISCONTINUED | OUTPATIENT
Start: 2020-06-25 | End: 2020-06-25 | Stop reason: HOSPADM

## 2020-06-25 RX ORDER — BUPIVACAINE HYDROCHLORIDE AND EPINEPHRINE 5; 5 MG/ML; UG/ML
INJECTION, SOLUTION EPIDURAL; INTRACAUDAL; PERINEURAL AS NEEDED
Status: DISCONTINUED | OUTPATIENT
Start: 2020-06-25 | End: 2020-06-25 | Stop reason: HOSPADM

## 2020-06-25 RX ORDER — SUCCINYLCHOLINE CHLORIDE 20 MG/ML
INJECTION INTRAMUSCULAR; INTRAVENOUS AS NEEDED
Status: DISCONTINUED | OUTPATIENT
Start: 2020-06-25 | End: 2020-06-25 | Stop reason: SURG

## 2020-06-25 RX ORDER — SODIUM CHLORIDE 0.9 % (FLUSH) 0.9 %
3-10 SYRINGE (ML) INJECTION AS NEEDED
Status: DISCONTINUED | OUTPATIENT
Start: 2020-06-25 | End: 2020-06-25 | Stop reason: HOSPADM

## 2020-06-25 RX ORDER — MAGNESIUM HYDROXIDE 1200 MG/15ML
LIQUID ORAL AS NEEDED
Status: DISCONTINUED | OUTPATIENT
Start: 2020-06-25 | End: 2020-06-25 | Stop reason: HOSPADM

## 2020-06-25 RX ORDER — SODIUM CHLORIDE 0.9 % (FLUSH) 0.9 %
3 SYRINGE (ML) INJECTION EVERY 12 HOURS SCHEDULED
Status: DISCONTINUED | OUTPATIENT
Start: 2020-06-25 | End: 2020-06-25 | Stop reason: HOSPADM

## 2020-06-25 RX ORDER — SODIUM CHLORIDE, SODIUM LACTATE, POTASSIUM CHLORIDE, CALCIUM CHLORIDE 600; 310; 30; 20 MG/100ML; MG/100ML; MG/100ML; MG/100ML
9 INJECTION, SOLUTION INTRAVENOUS CONTINUOUS PRN
Status: DISCONTINUED | OUTPATIENT
Start: 2020-06-25 | End: 2020-06-27 | Stop reason: HOSPADM

## 2020-06-25 RX ORDER — ESMOLOL HYDROCHLORIDE 10 MG/ML
INJECTION INTRAVENOUS AS NEEDED
Status: DISCONTINUED | OUTPATIENT
Start: 2020-06-25 | End: 2020-06-25 | Stop reason: SURG

## 2020-06-25 RX ORDER — GLYCOPYRROLATE 0.2 MG/ML
INJECTION INTRAMUSCULAR; INTRAVENOUS AS NEEDED
Status: DISCONTINUED | OUTPATIENT
Start: 2020-06-25 | End: 2020-06-25 | Stop reason: SURG

## 2020-06-25 RX ADMIN — FENTANYL CITRATE 50 MCG: 50 INJECTION, SOLUTION INTRAMUSCULAR; INTRAVENOUS at 15:25

## 2020-06-25 RX ADMIN — HYDROMORPHONE HYDROCHLORIDE 0.5 MG: 1 INJECTION, SOLUTION INTRAMUSCULAR; INTRAVENOUS; SUBCUTANEOUS at 16:14

## 2020-06-25 RX ADMIN — FENTANYL CITRATE 50 MCG: 50 INJECTION, SOLUTION INTRAMUSCULAR; INTRAVENOUS at 15:04

## 2020-06-25 RX ADMIN — FLUOXETINE HYDROCHLORIDE 60 MG: 20 CAPSULE ORAL at 17:55

## 2020-06-25 RX ADMIN — ROSUVASTATIN CALCIUM 10 MG: 10 TABLET, COATED ORAL at 17:56

## 2020-06-25 RX ADMIN — HYDROMORPHONE HYDROCHLORIDE 0.5 MG: 1 INJECTION, SOLUTION INTRAMUSCULAR; INTRAVENOUS; SUBCUTANEOUS at 08:21

## 2020-06-25 RX ADMIN — HYDROMORPHONE HYDROCHLORIDE 0.5 MG: 1 INJECTION, SOLUTION INTRAMUSCULAR; INTRAVENOUS; SUBCUTANEOUS at 16:38

## 2020-06-25 RX ADMIN — SODIUM CHLORIDE 1000 ML: 9 INJECTION, SOLUTION INTRAVENOUS at 04:44

## 2020-06-25 RX ADMIN — FENTANYL CITRATE 25 MCG: 50 INJECTION, SOLUTION INTRAMUSCULAR; INTRAVENOUS at 15:34

## 2020-06-25 RX ADMIN — LIDOCAINE HYDROCHLORIDE 50 MG: 10 INJECTION, SOLUTION EPIDURAL; INFILTRATION; INTRACAUDAL; PERINEURAL at 14:00

## 2020-06-25 RX ADMIN — SODIUM CHLORIDE 125 ML/HR: 9 INJECTION, SOLUTION INTRAVENOUS at 17:55

## 2020-06-25 RX ADMIN — CETIRIZINE HYDROCHLORIDE 10 MG: 10 TABLET, FILM COATED ORAL at 17:56

## 2020-06-25 RX ADMIN — GLYCOPYRROLATE 0.8 MG: 0.2 INJECTION INTRAMUSCULAR; INTRAVENOUS at 15:22

## 2020-06-25 RX ADMIN — ACETAMINOPHEN 1000 MG: 500 TABLET, FILM COATED ORAL at 06:27

## 2020-06-25 RX ADMIN — ESMOLOL HYDROCHLORIDE 30 MG: 10 INJECTION, SOLUTION INTRAVENOUS at 14:20

## 2020-06-25 RX ADMIN — ROCURONIUM BROMIDE 30 MG: 10 INJECTION INTRAVENOUS at 14:09

## 2020-06-25 RX ADMIN — NEOSTIGMINE 5 MG: 1 INJECTION INTRAVENOUS at 15:22

## 2020-06-25 RX ADMIN — ONDANSETRON 4 MG: 2 INJECTION INTRAMUSCULAR; INTRAVENOUS at 15:07

## 2020-06-25 RX ADMIN — CEFAZOLIN SODIUM 2 G: 2 INJECTION, SOLUTION INTRAVENOUS at 13:51

## 2020-06-25 RX ADMIN — METOPROLOL TARTRATE 2.5 MG: 5 INJECTION, SOLUTION INTRAVENOUS at 15:05

## 2020-06-25 RX ADMIN — ROCURONIUM BROMIDE 10 MG: 10 INJECTION INTRAVENOUS at 14:51

## 2020-06-25 RX ADMIN — PROPOFOL 200 MG: 10 INJECTION, EMULSION INTRAVENOUS at 14:00

## 2020-06-25 RX ADMIN — FENTANYL CITRATE 25 MCG: 50 INJECTION, SOLUTION INTRAMUSCULAR; INTRAVENOUS at 15:30

## 2020-06-25 RX ADMIN — ACETAMINOPHEN 1000 MG: 500 TABLET, FILM COATED ORAL at 21:10

## 2020-06-25 RX ADMIN — METOPROLOL TARTRATE 2.5 MG: 5 INJECTION, SOLUTION INTRAVENOUS at 15:14

## 2020-06-25 RX ADMIN — ROCURONIUM BROMIDE 10 MG: 10 INJECTION INTRAVENOUS at 14:28

## 2020-06-25 RX ADMIN — HYDROMORPHONE HYDROCHLORIDE 0.5 MG: 1 INJECTION, SOLUTION INTRAMUSCULAR; INTRAVENOUS; SUBCUTANEOUS at 19:47

## 2020-06-25 RX ADMIN — PROMETHAZINE HYDROCHLORIDE 12.5 MG: 25 INJECTION INTRAMUSCULAR; INTRAVENOUS at 09:55

## 2020-06-25 RX ADMIN — SUCCINYLCHOLINE CHLORIDE 200 MG: 20 INJECTION, SOLUTION INTRAMUSCULAR; INTRAVENOUS; PARENTERAL at 14:00

## 2020-06-25 RX ADMIN — PANTOPRAZOLE SODIUM 40 MG: 40 TABLET, DELAYED RELEASE ORAL at 06:28

## 2020-06-25 RX ADMIN — ESMOLOL HYDROCHLORIDE 70 MG: 10 INJECTION, SOLUTION INTRAVENOUS at 13:58

## 2020-06-25 RX ADMIN — FENTANYL CITRATE 50 MCG: 50 INJECTION, SOLUTION INTRAMUSCULAR; INTRAVENOUS at 14:28

## 2020-06-25 RX ADMIN — SODIUM CHLORIDE, PRESERVATIVE FREE 3 ML: 5 INJECTION INTRAVENOUS at 19:52

## 2020-06-25 RX ADMIN — ROCURONIUM BROMIDE 10 MG: 10 INJECTION INTRAVENOUS at 14:00

## 2020-06-25 RX ADMIN — AMLODIPINE BESYLATE 5 MG: 5 TABLET ORAL at 17:56

## 2020-06-25 RX ADMIN — SODIUM CHLORIDE, POTASSIUM CHLORIDE, SODIUM LACTATE AND CALCIUM CHLORIDE: 600; 310; 30; 20 INJECTION, SOLUTION INTRAVENOUS at 13:45

## 2020-06-25 RX ADMIN — METOPROLOL SUCCINATE 12.5 MG: 25 TABLET, EXTENDED RELEASE ORAL at 13:36

## 2020-06-25 RX ADMIN — DEXAMETHASONE SODIUM PHOSPHATE 4 MG: 4 INJECTION, SOLUTION INTRAMUSCULAR; INTRAVENOUS at 14:00

## 2020-06-25 NOTE — ANESTHESIA PREPROCEDURE EVALUATION
Anesthesia Evaluation                  Airway   Mallampati: II  Dental      Pulmonary    Cardiovascular     (+) hypertension,       Neuro/Psych  GI/Hepatic/Renal/Endo    (+) morbid obesity,      Musculoskeletal     (+) back pain,   Abdominal    Substance History      OB/GYN          Other                        Anesthesia Plan    ASA 3     general     intravenous induction     Anesthetic plan, all risks, benefits, and alternatives have been provided, discussed and informed consent has been obtained with: patient.

## 2020-06-25 NOTE — ANESTHESIA POSTPROCEDURE EVALUATION
Patient: Dipak Marinelli    Procedure Summary     Date:  06/25/20 Room / Location:   [facility]  [facility]    Anesthesia Start:  1351 Anesthesia Stop:  1545    Procedure:  DIAGNOSTIC LAPAROSCOPY, EVACUATION OF INTRA-ABDOMINAL HEMATOMA (N/A Abdomen) Diagnosis:      Surgeon:  Sonya Barr MD Provider:  Adal Ramos MD    Anesthesia Type:  general ASA Status:  3          Anesthesia Type: general    Vitals  Vitals Value Taken Time   BP     Temp     Pulse 104 6/25/2020  3:44 PM   Resp     SpO2 92 % 6/25/2020  3:44 PM   Vitals shown include unvalidated device data.        Post Anesthesia Care and Evaluation    Patient location during evaluation: PACU  Patient participation: complete - patient participated  Level of consciousness: awake and alert  Pain score: 0  Pain management: adequate  Airway patency: patent  Anesthetic complications: No anesthetic complications  PONV Status: none  Cardiovascular status: hemodynamically stable and acceptable  Respiratory status: nonlabored ventilation, acceptable and nasal cannula  Hydration status: acceptable

## 2020-06-25 NOTE — ANESTHESIA PROCEDURE NOTES
Airway  Urgency: elective    Date/Time: 6/25/2020 2:01 PM  Airway not difficult    General Information and Staff    Patient location during procedure: OR  CRNA: Marcela Diamond CRNA    Indications and Patient Condition  Indications for airway management: airway protection    Preoxygenated: yes  MILS not maintained throughout  Mask difficulty assessment: 0 - not attempted    Final Airway Details  Final airway type: endotracheal airway      Successful airway: ETT  Cuffed: yes   Successful intubation technique: direct laryngoscopy  Facilitating devices/methods: intubating stylet  Endotracheal tube insertion site: oral  Blade: Tia  Blade size: 4  ETT size (mm): 7.5  Cormack-Lehane Classification: grade I - full view of glottis  Placement verified by: chest auscultation and capnometry   Measured from: lips  ETT/EBT  to lips (cm): 22  Number of attempts at approach: 1  Assessment: lips, teeth, and gum same as pre-op and atraumatic intubation    Additional Comments  Negative epigastric sounds, Breath sound equal bilaterally with symmetric chest rise and fall

## 2020-06-26 LAB
ALBUMIN SERPL-MCNC: 3.2 G/DL (ref 3.5–5.2)
ALBUMIN/GLOB SERPL: 1.5 G/DL
ALP SERPL-CCNC: 51 U/L (ref 39–117)
ALT SERPL W P-5'-P-CCNC: 40 U/L (ref 1–41)
ANION GAP SERPL CALCULATED.3IONS-SCNC: 7 MMOL/L (ref 5–15)
AST SERPL-CCNC: 22 U/L (ref 1–40)
BASOPHILS # BLD AUTO: 0.04 10*3/MM3 (ref 0–0.2)
BASOPHILS NFR BLD AUTO: 0.3 % (ref 0–1.5)
BILIRUB SERPL-MCNC: 0.4 MG/DL (ref 0.2–1.2)
BUN BLD-MCNC: 14 MG/DL (ref 6–20)
BUN/CREAT SERPL: 22.6 (ref 7–25)
CALCIUM SPEC-SCNC: 7.7 MG/DL (ref 8.6–10.5)
CHLORIDE SERPL-SCNC: 103 MMOL/L (ref 98–107)
CO2 SERPL-SCNC: 26 MMOL/L (ref 22–29)
CREAT BLD-MCNC: 0.62 MG/DL (ref 0.76–1.27)
DEPRECATED RDW RBC AUTO: 45.7 FL (ref 37–54)
EOSINOPHIL # BLD AUTO: 0.02 10*3/MM3 (ref 0–0.4)
EOSINOPHIL NFR BLD AUTO: 0.1 % (ref 0.3–6.2)
ERYTHROCYTE [DISTWIDTH] IN BLOOD BY AUTOMATED COUNT: 13.7 % (ref 12.3–15.4)
GFR SERPL CREATININE-BSD FRML MDRD: 135 ML/MIN/1.73
GLOBULIN UR ELPH-MCNC: 2.2 GM/DL
GLUCOSE BLD-MCNC: 125 MG/DL (ref 65–99)
GLUCOSE BLDC GLUCOMTR-MCNC: 124 MG/DL (ref 70–130)
GLUCOSE BLDC GLUCOMTR-MCNC: 127 MG/DL (ref 70–130)
GLUCOSE BLDC GLUCOMTR-MCNC: 130 MG/DL (ref 70–130)
GLUCOSE BLDC GLUCOMTR-MCNC: 139 MG/DL (ref 70–130)
HCT VFR BLD AUTO: 23.8 % (ref 37.5–51)
HCT VFR BLD AUTO: 26.7 % (ref 37.5–51)
HGB BLD-MCNC: 7.5 G/DL (ref 13–17.7)
HGB BLD-MCNC: 8.3 G/DL (ref 13–17.7)
HGB BLD-MCNC: 8.8 G/DL (ref 13–17.7)
IMM GRANULOCYTES # BLD AUTO: 0.1 10*3/MM3 (ref 0–0.05)
IMM GRANULOCYTES NFR BLD AUTO: 0.7 % (ref 0–0.5)
LYMPHOCYTES # BLD AUTO: 2.78 10*3/MM3 (ref 0.7–3.1)
LYMPHOCYTES NFR BLD AUTO: 18.2 % (ref 19.6–45.3)
MCH RBC QN AUTO: 29 PG (ref 26.6–33)
MCHC RBC AUTO-ENTMCNC: 31.5 G/DL (ref 31.5–35.7)
MCV RBC AUTO: 91.9 FL (ref 79–97)
MONOCYTES # BLD AUTO: 1.47 10*3/MM3 (ref 0.1–0.9)
MONOCYTES NFR BLD AUTO: 9.6 % (ref 5–12)
NEUTROPHILS # BLD AUTO: 10.89 10*3/MM3 (ref 1.7–7)
NEUTROPHILS NFR BLD AUTO: 71.1 % (ref 42.7–76)
NRBC BLD AUTO-RTO: 0 /100 WBC (ref 0–0.2)
PLATELET # BLD AUTO: 216 10*3/MM3 (ref 140–450)
PMV BLD AUTO: 9.9 FL (ref 6–12)
POTASSIUM BLD-SCNC: 4.2 MMOL/L (ref 3.5–5.2)
PROT SERPL-MCNC: 5.4 G/DL (ref 6–8.5)
RBC # BLD AUTO: 2.59 10*6/MM3 (ref 4.14–5.8)
SODIUM BLD-SCNC: 136 MMOL/L (ref 136–145)
WBC NRBC COR # BLD: 15.3 10*3/MM3 (ref 3.4–10.8)

## 2020-06-26 PROCEDURE — 85025 COMPLETE CBC W/AUTO DIFF WBC: CPT | Performed by: SURGERY

## 2020-06-26 PROCEDURE — 25010000002 FUROSEMIDE PER 20 MG: Performed by: SURGERY

## 2020-06-26 PROCEDURE — 86900 BLOOD TYPING SEROLOGIC ABO: CPT

## 2020-06-26 PROCEDURE — 82962 GLUCOSE BLOOD TEST: CPT

## 2020-06-26 PROCEDURE — P9016 RBC LEUKOCYTES REDUCED: HCPCS

## 2020-06-26 PROCEDURE — 25010000002 HYDROMORPHONE PER 4 MG: Performed by: SURGERY

## 2020-06-26 PROCEDURE — 85018 HEMOGLOBIN: CPT | Performed by: SURGERY

## 2020-06-26 PROCEDURE — 99024 POSTOP FOLLOW-UP VISIT: CPT | Performed by: SURGERY

## 2020-06-26 PROCEDURE — 99232 SBSQ HOSP IP/OBS MODERATE 35: CPT | Performed by: FAMILY MEDICINE

## 2020-06-26 PROCEDURE — 85014 HEMATOCRIT: CPT | Performed by: SURGERY

## 2020-06-26 PROCEDURE — 36430 TRANSFUSION BLD/BLD COMPNT: CPT

## 2020-06-26 PROCEDURE — 80053 COMPREHEN METABOLIC PANEL: CPT | Performed by: SURGERY

## 2020-06-26 RX ORDER — FUROSEMIDE 10 MG/ML
10 INJECTION INTRAMUSCULAR; INTRAVENOUS ONCE AS NEEDED
Status: COMPLETED | OUTPATIENT
Start: 2020-06-26 | End: 2020-06-26

## 2020-06-26 RX ORDER — FUROSEMIDE 10 MG/ML
10 INJECTION INTRAMUSCULAR; INTRAVENOUS ONCE
Status: DISCONTINUED | OUTPATIENT
Start: 2020-06-26 | End: 2020-06-26

## 2020-06-26 RX ADMIN — PANTOPRAZOLE SODIUM 40 MG: 40 TABLET, DELAYED RELEASE ORAL at 08:43

## 2020-06-26 RX ADMIN — OXYCODONE 5 MG: 5 TABLET ORAL at 16:23

## 2020-06-26 RX ADMIN — OXYCODONE 5 MG: 5 TABLET ORAL at 08:59

## 2020-06-26 RX ADMIN — ROSUVASTATIN CALCIUM 10 MG: 10 TABLET, COATED ORAL at 08:42

## 2020-06-26 RX ADMIN — HYDROMORPHONE HYDROCHLORIDE 2 MG: 2 TABLET ORAL at 19:52

## 2020-06-26 RX ADMIN — SODIUM CHLORIDE 125 ML/HR: 9 INJECTION, SOLUTION INTRAVENOUS at 05:22

## 2020-06-26 RX ADMIN — ACETAMINOPHEN 1000 MG: 500 TABLET, FILM COATED ORAL at 22:42

## 2020-06-26 RX ADMIN — ACETAMINOPHEN 1000 MG: 500 TABLET, FILM COATED ORAL at 05:06

## 2020-06-26 RX ADMIN — HYDROMORPHONE HYDROCHLORIDE 0.5 MG: 1 INJECTION, SOLUTION INTRAMUSCULAR; INTRAVENOUS; SUBCUTANEOUS at 22:48

## 2020-06-26 RX ADMIN — CETIRIZINE HYDROCHLORIDE 10 MG: 10 TABLET, FILM COATED ORAL at 08:43

## 2020-06-26 RX ADMIN — SODIUM CHLORIDE, PRESERVATIVE FREE 3 ML: 5 INJECTION INTRAVENOUS at 22:42

## 2020-06-26 RX ADMIN — METOPROLOL SUCCINATE 25 MG: 25 TABLET, EXTENDED RELEASE ORAL at 08:43

## 2020-06-26 RX ADMIN — ACETAMINOPHEN 1000 MG: 500 TABLET, FILM COATED ORAL at 13:28

## 2020-06-26 RX ADMIN — FLUOXETINE HYDROCHLORIDE 60 MG: 20 CAPSULE ORAL at 08:42

## 2020-06-26 RX ADMIN — HYDROMORPHONE HYDROCHLORIDE 0.5 MG: 1 INJECTION, SOLUTION INTRAMUSCULAR; INTRAVENOUS; SUBCUTANEOUS at 09:31

## 2020-06-26 RX ADMIN — AMLODIPINE BESYLATE 5 MG: 5 TABLET ORAL at 08:43

## 2020-06-26 RX ADMIN — FUROSEMIDE 10 MG: 10 INJECTION, SOLUTION INTRAMUSCULAR; INTRAVENOUS at 12:19

## 2020-06-26 RX ADMIN — SODIUM CHLORIDE, PRESERVATIVE FREE 3 ML: 5 INJECTION INTRAVENOUS at 08:44

## 2020-06-27 VITALS
RESPIRATION RATE: 18 BRPM | WEIGHT: 296 LBS | TEMPERATURE: 98.2 F | DIASTOLIC BLOOD PRESSURE: 79 MMHG | OXYGEN SATURATION: 94 % | HEIGHT: 70 IN | HEART RATE: 76 BPM | BODY MASS INDEX: 42.37 KG/M2 | SYSTOLIC BLOOD PRESSURE: 116 MMHG

## 2020-06-27 PROBLEM — S30.1XXA ABDOMINAL HEMATOMA: Status: RESOLVED | Noted: 2020-06-25 | Resolved: 2020-06-27

## 2020-06-27 PROBLEM — D62 POSTOPERATIVE ANEMIA DUE TO ACUTE BLOOD LOSS: Status: RESOLVED | Noted: 2020-06-25 | Resolved: 2020-06-27

## 2020-06-27 PROBLEM — R55 NEAR SYNCOPE: Status: RESOLVED | Noted: 2020-06-24 | Resolved: 2020-06-27

## 2020-06-27 PROBLEM — R65.10 SIRS (SYSTEMIC INFLAMMATORY RESPONSE SYNDROME) (HCC): Status: RESOLVED | Noted: 2020-06-24 | Resolved: 2020-06-27

## 2020-06-27 LAB
ALBUMIN SERPL-MCNC: 3 G/DL (ref 3.5–5.2)
ALBUMIN/GLOB SERPL: 1.4 G/DL
ALP SERPL-CCNC: 49 U/L (ref 39–117)
ALT SERPL W P-5'-P-CCNC: 27 U/L (ref 1–41)
ANION GAP SERPL CALCULATED.3IONS-SCNC: 7 MMOL/L (ref 5–15)
AST SERPL-CCNC: 15 U/L (ref 1–40)
BASOPHILS # BLD AUTO: 0.1 10*3/MM3 (ref 0–0.2)
BASOPHILS NFR BLD AUTO: 0.8 % (ref 0–1.5)
BH BB BLOOD EXPIRATION DATE: NORMAL
BH BB BLOOD EXPIRATION DATE: NORMAL
BH BB BLOOD TYPE BARCODE: 1700
BH BB BLOOD TYPE BARCODE: 1700
BH BB DISPENSE STATUS: NORMAL
BH BB DISPENSE STATUS: NORMAL
BH BB PRODUCT CODE: NORMAL
BH BB PRODUCT CODE: NORMAL
BH BB UNIT NUMBER: NORMAL
BH BB UNIT NUMBER: NORMAL
BILIRUB SERPL-MCNC: 0.5 MG/DL (ref 0.2–1.2)
BUN BLD-MCNC: 9 MG/DL (ref 6–20)
BUN/CREAT SERPL: 13.6 (ref 7–25)
CALCIUM SPEC-SCNC: 7.9 MG/DL (ref 8.6–10.5)
CHLORIDE SERPL-SCNC: 104 MMOL/L (ref 98–107)
CO2 SERPL-SCNC: 27 MMOL/L (ref 22–29)
CREAT BLD-MCNC: 0.66 MG/DL (ref 0.76–1.27)
CROSSMATCH INTERPRETATION: NORMAL
CROSSMATCH INTERPRETATION: NORMAL
DEPRECATED RDW RBC AUTO: 47.8 FL (ref 37–54)
EOSINOPHIL # BLD AUTO: 0.53 10*3/MM3 (ref 0–0.4)
EOSINOPHIL NFR BLD AUTO: 4.5 % (ref 0.3–6.2)
ERYTHROCYTE [DISTWIDTH] IN BLOOD BY AUTOMATED COUNT: 14.2 % (ref 12.3–15.4)
GFR SERPL CREATININE-BSD FRML MDRD: 126 ML/MIN/1.73
GLOBULIN UR ELPH-MCNC: 2.2 GM/DL
GLUCOSE BLD-MCNC: 97 MG/DL (ref 65–99)
GLUCOSE BLDC GLUCOMTR-MCNC: 104 MG/DL (ref 70–130)
GLUCOSE BLDC GLUCOMTR-MCNC: 118 MG/DL (ref 70–130)
HCT VFR BLD AUTO: 26.3 % (ref 37.5–51)
HGB BLD-MCNC: 8.5 G/DL (ref 13–17.7)
IMM GRANULOCYTES # BLD AUTO: 0.12 10*3/MM3 (ref 0–0.05)
IMM GRANULOCYTES NFR BLD AUTO: 1 % (ref 0–0.5)
LYMPHOCYTES # BLD AUTO: 3.72 10*3/MM3 (ref 0.7–3.1)
LYMPHOCYTES NFR BLD AUTO: 31.4 % (ref 19.6–45.3)
MCH RBC QN AUTO: 29.8 PG (ref 26.6–33)
MCHC RBC AUTO-ENTMCNC: 32.3 G/DL (ref 31.5–35.7)
MCV RBC AUTO: 92.3 FL (ref 79–97)
MONOCYTES # BLD AUTO: 1.2 10*3/MM3 (ref 0.1–0.9)
MONOCYTES NFR BLD AUTO: 10.1 % (ref 5–12)
NEUTROPHILS # BLD AUTO: 6.19 10*3/MM3 (ref 1.7–7)
NEUTROPHILS NFR BLD AUTO: 52.2 % (ref 42.7–76)
NRBC BLD AUTO-RTO: 0.4 /100 WBC (ref 0–0.2)
PLATELET # BLD AUTO: 203 10*3/MM3 (ref 140–450)
PMV BLD AUTO: 9.6 FL (ref 6–12)
POTASSIUM BLD-SCNC: 4.1 MMOL/L (ref 3.5–5.2)
PROT SERPL-MCNC: 5.2 G/DL (ref 6–8.5)
RBC # BLD AUTO: 2.85 10*6/MM3 (ref 4.14–5.8)
SODIUM BLD-SCNC: 138 MMOL/L (ref 136–145)
UNIT  ABO: NORMAL
UNIT  ABO: NORMAL
UNIT  RH: NORMAL
UNIT  RH: NORMAL
WBC NRBC COR # BLD: 11.86 10*3/MM3 (ref 3.4–10.8)

## 2020-06-27 PROCEDURE — 99232 SBSQ HOSP IP/OBS MODERATE 35: CPT | Performed by: HOSPITALIST

## 2020-06-27 PROCEDURE — 82962 GLUCOSE BLOOD TEST: CPT

## 2020-06-27 PROCEDURE — 80053 COMPREHEN METABOLIC PANEL: CPT | Performed by: SURGERY

## 2020-06-27 PROCEDURE — 85025 COMPLETE CBC W/AUTO DIFF WBC: CPT | Performed by: SURGERY

## 2020-06-27 PROCEDURE — 99024 POSTOP FOLLOW-UP VISIT: CPT | Performed by: SURGERY

## 2020-06-27 RX ORDER — HYDROCODONE BITARTRATE AND ACETAMINOPHEN 7.5; 325 MG/1; MG/1
1 TABLET ORAL EVERY 4 HOURS PRN
Qty: 12 TABLET | Refills: 0 | Status: SHIPPED | OUTPATIENT
Start: 2020-06-27 | End: 2020-07-08

## 2020-06-27 RX ADMIN — OXYCODONE 5 MG: 5 TABLET ORAL at 09:25

## 2020-06-27 RX ADMIN — METOPROLOL SUCCINATE 25 MG: 25 TABLET, EXTENDED RELEASE ORAL at 09:25

## 2020-06-27 RX ADMIN — CETIRIZINE HYDROCHLORIDE 10 MG: 10 TABLET, FILM COATED ORAL at 09:25

## 2020-06-27 RX ADMIN — SODIUM CHLORIDE, PRESERVATIVE FREE 3 ML: 5 INJECTION INTRAVENOUS at 09:25

## 2020-06-27 RX ADMIN — ACETAMINOPHEN 1000 MG: 500 TABLET, FILM COATED ORAL at 07:04

## 2020-06-27 RX ADMIN — ACETAMINOPHEN 1000 MG: 500 TABLET, FILM COATED ORAL at 13:25

## 2020-06-27 RX ADMIN — OXYCODONE 5 MG: 5 TABLET ORAL at 15:49

## 2020-06-27 RX ADMIN — PANTOPRAZOLE SODIUM 40 MG: 40 TABLET, DELAYED RELEASE ORAL at 07:04

## 2020-06-27 RX ADMIN — AMLODIPINE BESYLATE 5 MG: 5 TABLET ORAL at 09:25

## 2020-06-27 RX ADMIN — ROSUVASTATIN CALCIUM 10 MG: 10 TABLET, COATED ORAL at 09:25

## 2020-06-27 RX ADMIN — FLUOXETINE HYDROCHLORIDE 60 MG: 20 CAPSULE ORAL at 09:25

## 2020-06-28 ENCOUNTER — READMISSION MANAGEMENT (OUTPATIENT)
Dept: CALL CENTER | Facility: HOSPITAL | Age: 55
End: 2020-06-28

## 2020-06-28 NOTE — OUTREACH NOTE
Prep Survey      Responses   [facility] facility patient discharged from?  [place]   Is LACE score < 7 ?  No   Eligibility  Readm Mgmt   Discharge diagnosis  Obesity,SIRS, s/p gastric sleeve   COVID-19 Test Status  Negative   Does the patient have one of the following disease processes/diagnoses(primary or secondary)?  General Surgery   Does the patient have Home health ordered?  No   Is there a DME ordered?  No   Prep survey completed?  Yes          Nery Lamas RN

## 2020-06-29 LAB
BACTERIA SPEC AEROBE CULT: NORMAL
BACTERIA SPEC AEROBE CULT: NORMAL

## 2020-07-01 ENCOUNTER — READMISSION MANAGEMENT (OUTPATIENT)
Dept: CALL CENTER | Facility: HOSPITAL | Age: 55
End: 2020-07-01

## 2020-07-01 NOTE — OUTREACH NOTE
General Surgery Week 1 Survey      Responses   [facility] patient discharged from?  [place]   Does the patient have one of the following disease processes/diagnoses(primary or secondary)?  General Surgery   Is there a successful TCM telephone encounter documented?  No   Week 1 attempt successful?  Yes   Call start time  0924   Call end time  0931   Discharge diagnosis  Obesity,SIRS, s/p gastric sleeve   Is patient permission given to speak with other caregiver?  Yes   List who call center can speak with  wife   Meds reviewed with patient/caregiver?  Yes   Is the patient having any side effects they believe may be caused by any medication additions or changes?  No   Does the patient have all medications related to this admission filled (includes all antibiotics, pain medications, etc.)  Yes   Is the patient taking all medications as directed (includes completed medication regime)?  Yes   Has the patient kept scheduled appointments due by today?  N/A   Comments  Plans to make appt with PCP for next week   Psychosocial issues?  No   Did the patient receive a copy of their discharge instructions?  Yes   Nursing interventions  Reviewed instructions with patient   What is the patient's perception of their health status since discharge?  Improving   Nursing interventions  Nurse provided patient education   Is the patient /caregiver able to teach back basic post-op care?  Take showers only when approved by MD-sponge bathe until then, No tub bath, swimming, or hot tub until instructed by MD, Keep incision areas clean,dry and protected   Is the patient/caregiver able to teach back signs and symptoms of incisional infection?  Increased redness, swelling or pain at the incisonal site, Increased drainage or bleeding, Incisional warmth, Pus or odor from incision, Fever   Is the patient/caregiver able to teach back steps to recovery at home?  Set small, achievable goals for return to baseline health, Eat a well-balance  diet   Is the patient/caregiver able to teach back the hierarchy of who to call/visit for symptoms/problems? PCP, Specialist, Home health nurse, Urgent Care, ED, 911  Yes   Additional teach back comments  States no nausea but unable to ingest all of the protein he needs to, has started on some solids, instructed to call office if unablle to take in necessary PRO, VU.   Week 1 call completed?  Yes          Earlene Mahmood RN

## 2020-07-06 ENCOUNTER — TELEMEDICINE (OUTPATIENT)
Dept: BARIATRICS/WEIGHT MGMT | Facility: CLINIC | Age: 55
End: 2020-07-06

## 2020-07-06 VITALS — WEIGHT: 273.6 LBS | HEIGHT: 70 IN | BODY MASS INDEX: 39.17 KG/M2

## 2020-07-06 DIAGNOSIS — Z98.84 STATUS POST BARIATRIC SURGERY: Primary | ICD-10-CM

## 2020-07-06 DIAGNOSIS — E66.9 OBESITY, CLASS II, BMI 35-39.9: ICD-10-CM

## 2020-07-06 PROCEDURE — 99024 POSTOP FOLLOW-UP VISIT: CPT | Performed by: PHYSICIAN ASSISTANT

## 2020-07-06 NOTE — PROGRESS NOTES
[facility] Bariatric Surgery  [address]  [address]  [facility] [id number] [phone]      Patient Name:  Dipak Marinelli.  :  1965      Reason for Visit:  POD #13/ POD#11    HPI:  Dipak Marinelli is a 54 y.o. male s/p LSG/ paraesophageal hernia repair by Dr. Barr on 20, diagnostic laparoscopy/ evacuation of intraabdominal hematoma 20    This was an audio and video enabled telemedicine encounter due to covid-19 pandemic, patient consents.    POD#1, severe abd pain, syncopal episode, rapid response called. Stat labs and CT chest/ abd/pelvis. POD#2 Hematoma on CT scan, drainage intraop. POD#3- improved. D/c POD#4, d/c RICHARD drain.     FINDINGS: Gallbladder surgically absent, paraesophageal hernia containing lipoma. Small nodule on paraesophageal lipoma/stomach interface that looked like a GIST tumor, sent as a separate specimen.  Abundant visceral fat causing operation to be technically difficult, with diffuse oozing requiring tranexamic acid.    Doing well. Incisions are healing well. Has some staples, planned to come into office today but car broke down, plans to go to PCP for staple removal locally.  Pretty sore abdominal muscles but improving. Has vomited a couple times with overeating, learning his limits. No issues/concerns. Denies dysphagia, reflux, nausea, pulmonary issues and fevers.  Tolerating diet progression - on stage 1.  Getting 50-60g prot/day, shakes, yogurt.  Drinking close to 64 fluid oz/day, diet green tea, coffee.  Taking MVI, B12, B1, Calcium, Vit D, iron and Vit C.  On Pantoprazole.  Holding ASA , NSAIDs , Tramadol, Hormones, Diuretics , Steroids and Immunologics.  Is holding ASA/ Plavix. Ambulating- active.     Presurgery weight: 296 pounds.  Today's weight is 124 kg (273 lb 9.6 oz) pounds, today's  Body mass index is 39.82 kg/m²., and@ weight loss since surgery is 23 pounds.       Final Diagnosis    1. STOMACH, SUBTOTAL  "GASTRECTOMY:  Gastric tissue with no significant histopathologic change.  Negative for intestinal metaplasia, dysplasia or significant inflammation.  No Helicobacter pylori-like organisims identified on routine stains.   2. SPECIMEN SUBMITTED AS \"GASTRIC NODULE LIPOMA AND POSSIBLE GIST TUMOR MARKED BY SILK SUTURE:\"  Area of organized fat necrosis.  Ten lymph nodes negative for tumor and granuloma.            Past Medical History:   Diagnosis Date   • Anxiety    • Asthma     Has been on oral steroids for asthma but it has been 10 years ago   • Back pain    • GENTILE (chronic airflow obstruction) (CMS/HCC)    • Coronary artery disease     s/p 8 stents, most recently 2017.  Cardiologist is in [place].   • DDD (degenerative disc disease), cervical    • Depression    • Fatigue    • GERD (gastroesophageal reflux disease)     mostly controlled on Aciphex.  +HH.  Discovered on EGD by Dr. Mobley.    • Glaucoma    • Hypertension    • Insomnia    • Mixed hyperlipidemia 10/18/2018    on a statin, but mostly due to prevention per cardiologist   • Neck pain    • Osteoarthritis    • Prediabetes 6/24/2020   • Spinal stenosis      Past Surgical History:   Procedure Laterality Date   • ABDOMINAL SURGERY     • BACK SURGERY  1990    (total of 8 back surgeries) [facility]   • BRAVO PROCEDURE N/A 9/19/2018    Procedure: ESOPHAGOGASTRODUODENOSCOPY AND WILDER;  Surgeon: Chadwick Mobley MD;  Location: [facility] OR;  Service: Gastroenterology   • CARDIAC CATHETERIZATION     • CARDIOVASCULAR STRESS TEST     • COLONOSCOPY  2010   • CORONARY ANGIOPLASTY WITH STENT PLACEMENT     • DIAGNOSTIC LAPAROSCOPY N/A 6/25/2020    Procedure: DIAGNOSTIC LAPAROSCOPY, EVACUATION OF INTRA-ABDOMINAL HEMATOMA;  Surgeon: Sonya Barr MD;  Location: [facility] OR;  Service: General;  Laterality: N/A;   • ELBOW PROCEDURE     • ENDOSCOPY      2018 EGD report from Dr. Mobley reviewed, \"Large hiatal hernia\", GE junction 40 cm.  Clinical photos from this EGD reviewed, " but could not see a picture of the hernia.   • ENDOSCOPY N/A 6/23/2020    Procedure: ESOPHAGOGASTRODUODENOSCOPY;  Surgeon: Sonya Barr MD;  Location:  [facility];  Service: Bariatric;  Laterality: N/A;   • GASTRIC SLEEVE LAPAROSCOPIC N/A 6/23/2020    Procedure: GASTRIC SLEEVE LAPAROSCOPIC;  Surgeon: Sonya Barr MD;  Location:  [facility];  Service: Bariatric;  Laterality: N/A;   • HERNIA REPAIR      incisional from anterior spinal fusion incision, repaired with mesh, performed in [place]   • JOINT REPLACEMENT Left    • LAPAROSCOPIC CHOLECYSTECTOMY      stones, in [place], [place] Dr. Arana   • MOUTH SURGERY     • NECK SURGERY     • OTHER SURGICAL HISTORY      elbow surgery   • PARAESOPHAGEAL HERNIA REPAIR N/A 6/23/2020    Procedure: PARAESOPHAGEAL HERNIA REPAIR LAPAROSCOPIC;  Surgeon: Sonya Barr MD;  Location:  [facility];  Service: Bariatric;  Laterality: N/A;   • SPINAL CORD STIMULATOR IMPLANT  2015    and removal   • TOTAL KNEE ARTHROPLASTY Left      Outpatient Medications Marked as Taking for the 7/6/20 encounter (Telemedicine) with Xi Rodriges PA-C   Medication Sig Dispense Refill   • albuterol (PROVENTIL) (2.5 MG/3ML) 0.083% nebulizer solution Take 2.5 mg by nebulization Every 4 (Four) Hours As Needed for Wheezing. 30 vial 2   • amLODIPine (NORVASC) 5 MG tablet Take 1 tablet by mouth Daily. 90 tablet 0   • FLUoxetine (PROzac) 20 MG capsule Take 3 capsules by mouth Daily. 270 capsule 1   • isosorbide mononitrate (Imdur) 30 MG 24 hr tablet Take 1 tablet by mouth Daily. 90 tablet 0   • lisinopril (PRINIVIL,ZESTRIL) 20 MG tablet Take 1 tablet by mouth Daily. 90 tablet 0   • loratadine (CLARITIN) 10 MG tablet Take 1 tablet by mouth Daily. 90 tablet 2   • metoprolol succinate XL (TOPROL-XL) 25 MG 24 hr tablet Take 1 tablet by mouth Daily. 90 tablet 0   • nitroglycerin (NITROSTAT) 0.4 MG SL tablet Place 1 tablet under the tongue Every 5 (Five) Minutes As Needed for Chest Pain. Take no more  "than 3 doses in 15 minutes. 30 tablet 1   • pantoprazole (PROTONIX) 40 MG EC tablet Take 1 tablet by mouth Daily. 90 tablet 0   • RABEprazole (ACIPHEX) 20 MG EC tablet Take 20 mg by mouth Daily.     • ranolazine (RANEXA) 500 MG 12 hr tablet Take 1 tablet by mouth 2 (Two) Times a Day. 180 tablet 0   • rosuvastatin (CRESTOR) 10 MG tablet Take 1 tablet by mouth Daily. 90 tablet 0   • VENTOLIN  (90 Base) MCG/ACT inhaler Inhale 1 puff Every 4 (Four) Hours As Needed for Wheezing. 1 inhaler 2     Allergies   Allergen Reactions   • Penicillins Hives     Has tolerated Keflex without issue   • Tape Rash     Silk tape caused skin blisters        Social History     Socioeconomic History   • Marital status:      Spouse name: Not on file   • Number of children: Not on file   • Years of education: Not on file   • Highest education level: Not on file   Tobacco Use   • Smoking status: Former Smoker     Packs/day: 2.00     Years: 35.00     Pack years: 70.00     Types: Cigarettes     Last attempt to quit: 10/31/2019     Years since quittin.6   • Smokeless tobacco: Never Used   Substance and Sexual Activity   • Alcohol use: Not Currently     Frequency: 2-4 times a month     Drinks per session: 1 or 2     Comment: formerly alcoholic   • Drug use: Yes     Types: Marijuana     Comment: smokes marijuana 2-3x per month.  last use was 1 month ago.   • Sexual activity: Defer   Social History Narrative    Stays home due to disability for back, heart disease.  Formerly in commercial constructions.  Lives wife and 1 child.        Ht 176.5 cm (69.5\")   Wt 124 kg (273 lb 9.6 oz)   BMI 39.82 kg/m²   Physical Exam   Constitutional: He is oriented to person, place, and time. He appears well-developed and well-nourished.   HENT:   Head: Normocephalic and atraumatic.   Pulmonary/Chest: Effort normal.   Neurological: He is alert and oriented to person, place, and time.   Psychiatric: He has a normal mood and affect. Thought content " normal.         Assessment:   POD #11 s/p LSG by Dr. Barr on 6/25/20    ICD-10-CM ICD-9-CM   1. Status post bariatric surgery Z98.84 V45.86   2. Obesity, Class II, BMI 35-39.9 E66.9 278.00           Plan:  Doing well. Start ASA 2 weeks postop, start plavix 6 weeks postop. Continue to advance diet per manual.  Increase protein intake to 100g/day.  Increase exercise/activity as tolerated.  Reviewed lifting restrictions, nothing >25 lbs x 2 more weeks.  Continue vitamins.  Continue PPI.  Continue to avoid NSAIDs/tramadol/tobacco x 6 weeks postop, steroids x 8 weeks postop.  Call w/ problems/concerns.    Patient's Body mass index is 39.82 kg/m². BMI is above normal parameters. Recommendations include: exercise counseling and nutrition counseling.        The patient was instructed to follow up in 3 weeks, sooner if needed.    Video visit was transitioned to telephone visit due to poor connectivity.

## 2020-07-07 ENCOUNTER — OFFICE VISIT (OUTPATIENT)
Dept: FAMILY MEDICINE CLINIC | Facility: CLINIC | Age: 55
End: 2020-07-07

## 2020-07-07 VITALS
HEIGHT: 69 IN | TEMPERATURE: 98.9 F | BODY MASS INDEX: 40.26 KG/M2 | SYSTOLIC BLOOD PRESSURE: 124 MMHG | OXYGEN SATURATION: 98 % | HEART RATE: 84 BPM | WEIGHT: 271.8 LBS | DIASTOLIC BLOOD PRESSURE: 82 MMHG

## 2020-07-07 DIAGNOSIS — Z48.02 ENCOUNTER FOR STAPLE REMOVAL: Primary | ICD-10-CM

## 2020-07-07 DIAGNOSIS — Z92.89 HISTORY OF BLOOD TRANSFUSION: ICD-10-CM

## 2020-07-07 DIAGNOSIS — I97.621 POSTOPERATIVE HEMATOMA INVOLVING CIRCULATORY SYSTEM FOLLOWING NON-CIRCULATORY SYSTEM PROCEDURE: ICD-10-CM

## 2020-07-07 PROCEDURE — 99214 OFFICE O/P EST MOD 30 MIN: CPT | Performed by: FAMILY MEDICINE

## 2020-07-07 NOTE — PROGRESS NOTES
Subjective   Dipak Marinelli is a 54 y.o. male.   Pt presents today with CC of Suture / Staple Removal (staple removal )      History of Present Illness   1.  Patient is here to follow-up after his gastric bypass surgery.  He has 8 staples to be removed.  The sutures he is already removed at home in following days.  He denies any concerns with his surgical wound.  There are 5 sites, 3 of which contain staples.  #2 while recovering from his surgery day 1 he developed sudden abdominal pain found to be hematoma.  Had to go back in and evacuate the hematoma.  He required 2 units of blood.  He needs follow-up labs today.           The following portions of the patient's history were reviewed and updated as appropriate: allergies, current medications, past family history, past medical history, past social history, past surgical history and problem list.    Review of Systems   Constitutional: Negative for chills, fever and unexpected weight loss.   HENT: Negative for congestion and sore throat.    Eyes: Negative for blurred vision and visual disturbance.   Respiratory: Negative for cough and wheezing.    Cardiovascular: Negative for chest pain and palpitations.   Gastrointestinal: Negative for abdominal pain and diarrhea.   Endocrine: Negative for cold intolerance and heat intolerance.   Genitourinary: Negative for dysuria.   Musculoskeletal: Negative for arthralgias and neck stiffness.   Neurological: Negative for dizziness, seizures and syncope.   Psychiatric/Behavioral: Negative for self-injury, suicidal ideas and depressed mood.       Objective   Physical Exam   Constitutional: He is oriented to person, place, and time. He appears well-developed and well-nourished.   HENT:   Head: Normocephalic and atraumatic.   Right Ear: External ear normal.   Left Ear: External ear normal.   Nose: Nose normal.   Mouth/Throat: Oropharynx is clear and moist.   Eyes: Pupils are equal, round, and reactive to light. Conjunctivae and  EOM are normal.   Neck: Normal range of motion. Neck supple.   Cardiovascular: Normal rate, regular rhythm and normal heart sounds.   Pulmonary/Chest: Effort normal and breath sounds normal.   Abdominal: Soft. Bowel sounds are normal.   Neurological: He is alert and oriented to person, place, and time.   Skin: Skin is warm and dry.   5 surgical sites, all healing well.  8 staples removed.  The sutures have already been removed by the patient at home.  No discharge or signs of infection.  At this time surgical sites all look great.   Psychiatric: He has a normal mood and affect. His behavior is normal.   Nursing note and vitals reviewed.        Assessment/Plan   Dipak was seen today for suture / staple removal.    Diagnoses and all orders for this visit:    Encounter for staple removal  8 sutures removed.  No complications.  His surgical sites all are healing well.  If he has any problems he is to follow-up in the clinic or with a surgeon.  Postoperative hematoma involving circulatory system following non-circulatory system procedure  -     Comprehensive Metabolic Panel; Future  -     CBC Auto Differential; Future  We will get follow-up labs to ensure no problems after his blood transfusion.  History of blood transfusion  -     Comprehensive Metabolic Panel; Future  -     CBC Auto Differential; Future                    Patient's Body mass index is 39.58 kg/m². BMI is above normal parameters. Recommendations include: exercise counseling and nutrition counseling.

## 2020-07-08 ENCOUNTER — TELEPHONE (OUTPATIENT)
Dept: FAMILY MEDICINE CLINIC | Facility: CLINIC | Age: 55
End: 2020-07-08

## 2020-07-08 ENCOUNTER — HOSPITAL ENCOUNTER (EMERGENCY)
Facility: HOSPITAL | Age: 55
Discharge: LEFT BEFORE DISCHARGE | End: 2020-07-08

## 2020-07-08 ENCOUNTER — OFFICE VISIT (OUTPATIENT)
Dept: FAMILY MEDICINE CLINIC | Facility: CLINIC | Age: 55
End: 2020-07-08

## 2020-07-08 VITALS
RESPIRATION RATE: 18 BRPM | HEIGHT: 70 IN | DIASTOLIC BLOOD PRESSURE: 79 MMHG | OXYGEN SATURATION: 96 % | TEMPERATURE: 97.3 F | BODY MASS INDEX: 38.8 KG/M2 | WEIGHT: 271 LBS | SYSTOLIC BLOOD PRESSURE: 138 MMHG | HEART RATE: 93 BPM

## 2020-07-08 VITALS
OXYGEN SATURATION: 98 % | SYSTOLIC BLOOD PRESSURE: 132 MMHG | HEART RATE: 116 BPM | DIASTOLIC BLOOD PRESSURE: 78 MMHG | HEIGHT: 70 IN | TEMPERATURE: 98.6 F | BODY MASS INDEX: 38.54 KG/M2 | WEIGHT: 269.2 LBS

## 2020-07-08 DIAGNOSIS — M25.532 LEFT WRIST PAIN: Primary | ICD-10-CM

## 2020-07-08 LAB
ALBUMIN SERPL-MCNC: 4 G/DL (ref 3.5–5.2)
ALBUMIN/GLOB SERPL: 1.7 G/DL
ALP SERPL-CCNC: 67 U/L (ref 39–117)
ALT SERPL-CCNC: 23 U/L (ref 1–41)
AST SERPL-CCNC: 19 U/L (ref 1–40)
BASOPHILS # BLD AUTO: 0.07 10*3/MM3 (ref 0–0.2)
BASOPHILS NFR BLD AUTO: 0.5 % (ref 0–1.5)
BILIRUB SERPL-MCNC: 0.4 MG/DL (ref 0–1.2)
BUN SERPL-MCNC: 9 MG/DL (ref 6–20)
BUN/CREAT SERPL: 13 (ref 7–25)
CALCIUM SERPL-MCNC: 8.9 MG/DL (ref 8.6–10.5)
CHLORIDE SERPL-SCNC: 98 MMOL/L (ref 98–107)
CO2 SERPL-SCNC: 23.5 MMOL/L (ref 22–29)
CREAT SERPL-MCNC: 0.69 MG/DL (ref 0.76–1.27)
EOSINOPHIL # BLD AUTO: 0.38 10*3/MM3 (ref 0–0.4)
EOSINOPHIL NFR BLD AUTO: 2.8 % (ref 0.3–6.2)
ERYTHROCYTE [DISTWIDTH] IN BLOOD BY AUTOMATED COUNT: 14 % (ref 12.3–15.4)
GLOBULIN SER CALC-MCNC: 2.4 GM/DL
GLUCOSE SERPL-MCNC: 101 MG/DL (ref 65–99)
HCT VFR BLD AUTO: 35.7 % (ref 37.5–51)
HGB BLD-MCNC: 11.8 G/DL (ref 13–17.7)
IMM GRANULOCYTES # BLD AUTO: 0.09 10*3/MM3 (ref 0–0.05)
IMM GRANULOCYTES NFR BLD AUTO: 0.7 % (ref 0–0.5)
LYMPHOCYTES # BLD AUTO: 2.96 10*3/MM3 (ref 0.7–3.1)
LYMPHOCYTES NFR BLD AUTO: 21.9 % (ref 19.6–45.3)
MCH RBC QN AUTO: 28.3 PG (ref 26.6–33)
MCHC RBC AUTO-ENTMCNC: 33.1 G/DL (ref 31.5–35.7)
MCV RBC AUTO: 85.6 FL (ref 79–97)
MONOCYTES # BLD AUTO: 1.01 10*3/MM3 (ref 0.1–0.9)
MONOCYTES NFR BLD AUTO: 7.5 % (ref 5–12)
NEUTROPHILS # BLD AUTO: 8.98 10*3/MM3 (ref 1.7–7)
NEUTROPHILS NFR BLD AUTO: 66.6 % (ref 42.7–76)
NRBC BLD AUTO-RTO: 0.1 /100 WBC (ref 0–0.2)
PLATELET # BLD AUTO: 491 10*3/MM3 (ref 140–450)
POTASSIUM SERPL-SCNC: 3.7 MMOL/L (ref 3.5–5.2)
PROT SERPL-MCNC: 6.4 G/DL (ref 6–8.5)
RBC # BLD AUTO: 4.17 10*6/MM3 (ref 4.14–5.8)
SODIUM SERPL-SCNC: 134 MMOL/L (ref 136–145)
WBC # BLD AUTO: 13.49 10*3/MM3 (ref 3.4–10.8)

## 2020-07-08 PROCEDURE — 99214 OFFICE O/P EST MOD 30 MIN: CPT | Performed by: FAMILY MEDICINE

## 2020-07-08 RX ORDER — DOXYCYCLINE 100 MG/1
100 CAPSULE ORAL 2 TIMES DAILY
Qty: 20 CAPSULE | Refills: 0 | Status: SHIPPED | OUTPATIENT
Start: 2020-07-08 | End: 2020-09-14

## 2020-07-08 RX ORDER — HYDROCODONE BITARTRATE AND ACETAMINOPHEN 5; 325 MG/1; MG/1
1 TABLET ORAL EVERY 6 HOURS PRN
Qty: 21 TABLET | Refills: 0 | Status: SHIPPED | OUTPATIENT
Start: 2020-07-08 | End: 2020-09-14

## 2020-07-08 NOTE — ED NOTES
"Patient came to the triage desk at this time to ask where he was in line. Patient was told that rooms were being cleaned and that we would get him back as soon as possible. Patient was also asked if he need anything while he waited. Patient states \" this is bullshit that zoraida waited this long for something like this. Give me a damn paper so I can sign out and go home. I can hurt there for free.\" . Patient was given and explanation of benefits versus risks of leaving and staying and states that he doesn't want to leave. Patient signed forms and left. Lead RN made aware.     Kemar Iqbal RN  07/08/20 0014    "

## 2020-07-08 NOTE — TELEPHONE ENCOUNTER
I called patient and made him aware of labs. He complains of swelling on top of his wrist that is painful. He says yesterday he did not have this issue but woke up this morning with swelling that looks like a golf ball on top of his wrist and going up his arm

## 2020-07-08 NOTE — TELEPHONE ENCOUNTER
Patient returned call and says his in pain and very nauseated so he is going to the ER. He just wanted you to be aware.

## 2020-07-08 NOTE — ED NOTES
"Patient states \" my hand is starting to go numb\". Patient reassessed at this time. Radial pulse palpable. Neurovascular in tact. Capillary refill less than 3 seconds. Patient hand is not discolored or swollen. Patients hand is normal temperature to touch.      Kemar Iqbal RN  07/08/20 9624    "

## 2020-07-08 NOTE — TELEPHONE ENCOUNTER
----- Message from Pilo Lock, DO sent at 7/8/2020  8:10 AM EDT -----  I reviewed your labs.  Everything looks fine with the exception of your white blood cell count has went back up a little.  Unclear as to what is causing this though your recent surgery with complications makes me worry about infectious causes.  If he develop any abdominal pain, bloating, or fevers, he need to be evaluated emergently.  If you still feel well I recommend reevaluation next week along with repeat labs.  Your white blood cell count was 13.49, and your platelet count went up to 491.  Your hemoglobin went up to 11.8(this is a good thing).

## 2020-07-08 NOTE — PROGRESS NOTES
Subjective   Dipak Marinelli is a 54 y.o. male.   Pt presents today with CC of Wrist Pain (left wrist )      History of Present Illness   Patient complains of left dorsal wrist pain.  It is just proximal to the wrist joint.  Associated with mild swelling.  NSAIDs have not been helpful.  He reports the pain is 9 out of 10 severity.  Any movement of his wrist is very painful.  Of note, he was recently discharged from the hospital for surgery.  He had IVs in this hand though does not recall any problems with any of them.  He is not aware of any trauma.         The following portions of the patient's history were reviewed and updated as appropriate: allergies, current medications, past family history, past medical history, past social history, past surgical history and problem list.    Review of Systems   Constitutional: Negative for chills, fever and unexpected weight loss.   HENT: Negative for congestion and sore throat.    Eyes: Negative for blurred vision and visual disturbance.   Respiratory: Negative for cough and wheezing.    Cardiovascular: Negative for chest pain and palpitations.   Gastrointestinal: Negative for abdominal pain and diarrhea.   Endocrine: Negative for cold intolerance and heat intolerance.   Genitourinary: Negative for dysuria.   Musculoskeletal: Negative for arthralgias and neck stiffness.   Neurological: Negative for dizziness, seizures and syncope.   Psychiatric/Behavioral: Negative for self-injury, suicidal ideas and depressed mood.       Objective   Physical Exam   Constitutional: He is oriented to person, place, and time. He appears well-developed and well-nourished.   HENT:   Head: Normocephalic and atraumatic.   Right Ear: External ear normal.   Left Ear: External ear normal.   Nose: Nose normal.   Mouth/Throat: Oropharynx is clear and moist.   Eyes: Pupils are equal, round, and reactive to light. Conjunctivae and EOM are normal.   Neck: Normal range of motion. Neck supple.    Cardiovascular: Normal rate, regular rhythm and normal heart sounds.   Pulmonary/Chest: Effort normal and breath sounds normal.   Abdominal: Soft. Bowel sounds are normal.   Musculoskeletal:   Left dorsal wrist is mostly normal appearing.  Slightly puffy over dorsal wrist near the distal radius.  Any flexion or extension at the wrist is tender, radial and ulnar deviation also painful.  Squeeze test does not seem to bother him.  No signs of trauma.  No red streaking or bruising.  Signs of previous IVs in his antecubital fossa, but they all appear normal, no infiltration noted.   Neurological: He is alert and oriented to person, place, and time.   Skin: Skin is warm and dry.   Psychiatric: He has a normal mood and affect. His behavior is normal.   Nursing note and vitals reviewed.        Assessment/Plan   Dipak was seen today for wrist pain.    Diagnoses and all orders for this visit:    Left wrist pain  -     HYDROcodone-acetaminophen (Norco) 5-325 MG per tablet; Take 1 tablet by mouth Every 6 (Six) Hours As Needed for Moderate Pain .  -     XR Wrist 3+ View Left  -     doxycycline (MONODOX) 100 MG capsule; Take 1 capsule by mouth 2 (Two) Times a Day.    He left from the emergency room without being seen hours ago.  Because of his recent surgery and sudden flareup, will treat with doxycycline 100 mg twice a day in the case that this represents infection.  We will get an x-ray of his wrist.  Will refill a lower dose of Norco for a small supply.  Recommend he only take this when the pain is severe, otherwise I recommend ice and elevation.  Recommend follow-up after his x-ray.    If his x-ray is normal I will recommend wrist thumb spica brace to provide compression and support to the wrist.  No documentation was seen from his hospitalization that would give any sort of clue.  He denies trauma, there is no bruising or ecchymosis that would suggest acute fracture.             Patient's Body mass index is 38.63 kg/m².  BMI is above normal parameters. Recommendations include: exercise counseling and nutrition counseling.

## 2020-07-09 ENCOUNTER — HOSPITAL ENCOUNTER (OUTPATIENT)
Dept: GENERAL RADIOLOGY | Facility: HOSPITAL | Age: 55
Discharge: HOME | End: 2020-07-09
Admitting: FAMILY MEDICINE

## 2020-07-09 PROCEDURE — 73110 X-RAY EXAM OF WRIST: CPT | Performed by: RADIOLOGY

## 2020-07-09 PROCEDURE — 73110 X-RAY EXAM OF WRIST: CPT

## 2020-07-10 ENCOUNTER — TELEPHONE (OUTPATIENT)
Dept: FAMILY MEDICINE CLINIC | Facility: CLINIC | Age: 55
End: 2020-07-10

## 2020-07-10 NOTE — TELEPHONE ENCOUNTER
----- Message from Pilo Lock, DO sent at 7/10/2020  1:31 PM EDT -----  Your x-ray looks okay.  There are no obvious problems seen.  How is your wrist feeling after icing?  If you are still having problems I recommend a wrist brace.  An order has been sent if you would like.  If not, let us know.  We will see you at follow-up.      Tried to reach patient ,no answer and no voicemail option.    Patient was seen in the office this morning and issues were addressed at that visit per patient.

## 2020-07-13 ENCOUNTER — RESULTS ENCOUNTER (OUTPATIENT)
Dept: FAMILY MEDICINE CLINIC | Facility: CLINIC | Age: 55
End: 2020-07-13

## 2020-07-13 ENCOUNTER — OFFICE VISIT (OUTPATIENT)
Dept: FAMILY MEDICINE CLINIC | Facility: CLINIC | Age: 55
End: 2020-07-13

## 2020-07-13 VITALS
BODY MASS INDEX: 39.11 KG/M2 | SYSTOLIC BLOOD PRESSURE: 126 MMHG | HEART RATE: 65 BPM | TEMPERATURE: 97.3 F | OXYGEN SATURATION: 98 % | HEIGHT: 70 IN | DIASTOLIC BLOOD PRESSURE: 82 MMHG | WEIGHT: 273.2 LBS

## 2020-07-13 DIAGNOSIS — M25.532 LEFT WRIST PAIN: ICD-10-CM

## 2020-07-13 DIAGNOSIS — I97.621 POSTOPERATIVE HEMATOMA INVOLVING CIRCULATORY SYSTEM FOLLOWING NON-CIRCULATORY SYSTEM PROCEDURE: ICD-10-CM

## 2020-07-13 DIAGNOSIS — I97.621 POSTOPERATIVE HEMATOMA INVOLVING CIRCULATORY SYSTEM FOLLOWING NON-CIRCULATORY SYSTEM PROCEDURE: Primary | ICD-10-CM

## 2020-07-13 PROCEDURE — 99213 OFFICE O/P EST LOW 20 MIN: CPT | Performed by: FAMILY MEDICINE

## 2020-07-13 NOTE — PROGRESS NOTES
Subjective   Dipak Marinelli is a 54 y.o. male.   Pt presents today with CC of Wrist Pain (left wrist)      History of Present Illness   He is here to follow-up on left wrist pain.  It is located on his dorsal  to the radial side.  He says that ice is been most helpful.  He got a phone call from the brace company Saturday but was unable to catch the call.  He will try to get a hold of them today to get his brace.  Overall, he feels as though his pain is 50% better already.  He has no other concerns.  #2 he had postop bleeding.  He would like to have his hemoglobin rechecked to ensure there is no further problems.       The following portions of the patient's history were reviewed and updated as appropriate: allergies, current medications, past family history, past medical history, past social history, past surgical history and problem list.    Review of Systems   Constitutional: Negative for chills, fever and unexpected weight loss.   HENT: Negative for congestion and sore throat.    Eyes: Negative for blurred vision and visual disturbance.   Respiratory: Negative for cough and wheezing.    Cardiovascular: Negative for chest pain and palpitations.   Gastrointestinal: Negative for abdominal pain and diarrhea.   Endocrine: Negative for cold intolerance and heat intolerance.   Genitourinary: Negative for dysuria.   Musculoskeletal: Negative for arthralgias and neck stiffness.   Neurological: Negative for dizziness, seizures and syncope.   Psychiatric/Behavioral: Negative for self-injury, suicidal ideas and depressed mood.       Objective   Physical Exam   Constitutional: He appears well-developed and well-nourished.   Eyes: Conjunctivae are normal.   Cardiovascular: Normal rate and regular rhythm.   Pulmonary/Chest: Effort normal and breath sounds normal.   Musculoskeletal:   Left wrist appears normal.  Has tenderness to palpation of the dorsal wrist over the distal radius   Nursing note and vitals  reviewed.        Assessment/Plan   Dipak was seen today for wrist pain.    Diagnoses and all orders for this visit:    Postoperative hematoma involving circulatory system following non-circulatory system procedure  -     CBC Auto Differential; Future  -     CBC w AUTO Differential  We will get a updated blood count to ensure everything is doing well.  Left wrist pain    Recommend using the brace, rest and ice recommended.  In my medical opinion, narcotic pain medicines are no longer needed.  He still has a few that he has not taken yet.                Patient's Body mass index is 39.2 kg/m². BMI is above normal parameters. Recommendations include: exercise counseling and nutrition counseling.

## 2020-07-14 LAB
BASOPHILS # BLD AUTO: 0.04 10*3/MM3 (ref 0–0.2)
BASOPHILS NFR BLD AUTO: 0.5 % (ref 0–1.5)
EOSINOPHIL # BLD AUTO: 0.22 10*3/MM3 (ref 0–0.4)
EOSINOPHIL NFR BLD AUTO: 2.6 % (ref 0.3–6.2)
ERYTHROCYTE [DISTWIDTH] IN BLOOD BY AUTOMATED COUNT: 13.8 % (ref 12.3–15.4)
HCT VFR BLD AUTO: 35.1 % (ref 37.5–51)
HGB BLD-MCNC: 11.4 G/DL (ref 13–17.7)
IMM GRANULOCYTES # BLD AUTO: 0.05 10*3/MM3 (ref 0–0.05)
IMM GRANULOCYTES NFR BLD AUTO: 0.6 % (ref 0–0.5)
LYMPHOCYTES # BLD AUTO: 2.26 10*3/MM3 (ref 0.7–3.1)
LYMPHOCYTES NFR BLD AUTO: 26.3 % (ref 19.6–45.3)
MCH RBC QN AUTO: 27.4 PG (ref 26.6–33)
MCHC RBC AUTO-ENTMCNC: 32.5 G/DL (ref 31.5–35.7)
MCV RBC AUTO: 84.4 FL (ref 79–97)
MONOCYTES # BLD AUTO: 0.63 10*3/MM3 (ref 0.1–0.9)
MONOCYTES NFR BLD AUTO: 7.3 % (ref 5–12)
NEUTROPHILS # BLD AUTO: 5.39 10*3/MM3 (ref 1.7–7)
NEUTROPHILS NFR BLD AUTO: 62.7 % (ref 42.7–76)
NRBC BLD AUTO-RTO: 0 /100 WBC (ref 0–0.2)
PLATELET # BLD AUTO: 428 10*3/MM3 (ref 140–450)
RBC # BLD AUTO: 4.16 10*6/MM3 (ref 4.14–5.8)
WBC # BLD AUTO: 8.59 10*3/MM3 (ref 3.4–10.8)

## 2020-07-15 ENCOUNTER — TELEPHONE (OUTPATIENT)
Dept: FAMILY MEDICINE CLINIC | Facility: CLINIC | Age: 55
End: 2020-07-15

## 2020-07-15 NOTE — TELEPHONE ENCOUNTER
----- Message from Pilo Lock DO sent at 7/15/2020  8:49 AM EDT -----  Labs were stable.  No concerns.    Patient notified.

## 2020-07-29 ENCOUNTER — TELEMEDICINE (OUTPATIENT)
Dept: BARIATRICS/WEIGHT MGMT | Facility: CLINIC | Age: 55
End: 2020-07-29

## 2020-07-29 DIAGNOSIS — M54.9 BACK PAIN, UNSPECIFIED BACK LOCATION, UNSPECIFIED BACK PAIN LATERALITY, UNSPECIFIED CHRONICITY: ICD-10-CM

## 2020-07-29 DIAGNOSIS — I10 ESSENTIAL HYPERTENSION: ICD-10-CM

## 2020-07-29 DIAGNOSIS — Z13.21 MALNUTRITION SCREEN: ICD-10-CM

## 2020-07-29 DIAGNOSIS — R53.83 FATIGUE, UNSPECIFIED TYPE: Primary | ICD-10-CM

## 2020-07-29 DIAGNOSIS — K91.2 POSTGASTRECTOMY MALABSORPTION: ICD-10-CM

## 2020-07-29 DIAGNOSIS — Z90.3 POSTGASTRECTOMY MALABSORPTION: ICD-10-CM

## 2020-07-29 DIAGNOSIS — E55.9 HYPOVITAMINOSIS D: ICD-10-CM

## 2020-07-29 DIAGNOSIS — K21.9 GASTROESOPHAGEAL REFLUX DISEASE, ESOPHAGITIS PRESENCE NOT SPECIFIED: ICD-10-CM

## 2020-07-29 DIAGNOSIS — Z13.0 SCREENING, IRON DEFICIENCY ANEMIA: ICD-10-CM

## 2020-07-29 DIAGNOSIS — E78.5 HYPERLIPIDEMIA, UNSPECIFIED HYPERLIPIDEMIA TYPE: ICD-10-CM

## 2020-07-29 DIAGNOSIS — M19.90 OSTEOARTHRITIS, UNSPECIFIED OSTEOARTHRITIS TYPE, UNSPECIFIED SITE: ICD-10-CM

## 2020-07-29 PROCEDURE — 99024 POSTOP FOLLOW-UP VISIT: CPT | Performed by: SURGERY

## 2020-07-29 NOTE — PROGRESS NOTES
[facility]  [address]  [address]  [facility] [address]  [phone]      Patient Name:  Dipak Marinelli.  :  1965      Date of Visit: 2020      Reason for Visit:  1 month postop    HPI:  Dipak Marinelli is a 54 y.o. male s/p LSG/ paraesophageal hernia repair by Dr. Barr on 20, diagnostic laparoscopy/ evacuation of intraabdominal hematoma 20    Was going to come to office for staple removal on POD#7, but his car broke down so he was going to have staples removed at PCP's office.  This was done.  His PCP treated him for some phlebitis on his wrist with abx, no steroids or aspirin. His wrist is better now.    Doing well.  No issues/concerns. Denies dysphagia, reflux, nausea, vomiting and abdominal pain.  Tolerating diet progression - on stage 5.  Getting 90-100g prot/day.  Drinking 64 fluid oz/day. Drinks mostly coffee--2 full pots per day--this is what he also did before surgery.   Taking MVI, B12, B1, Calcium, Vit D, iron and Vit C.  On Pantoprazole.  Still holding ASA , NSAIDs  and Steroids.  He has been walking outside.       Presurgery weight:  296 pounds. Today's weight is 264 pounds, today's There is no height or weight on file to calculate BMI., and his weight loss since surgery is 32 pounds.       Past Medical History:   Diagnosis Date   • Anxiety    • Asthma     Has been on oral steroids for asthma but it has been 10 years ago   • Back pain    • GENTILE (chronic airflow obstruction) (CMS/[facility])    • Coronary artery disease     s/p 8 stents, most recently .  Cardiologist is in [place].   • DDD (degenerative disc disease), cervical    • Depression    • Fatigue    • GERD (gastroesophageal reflux disease)     mostly controlled on Aciphex.  +HH.  Discovered on EGD by Dr. Mobley.    • Glaucoma    • Hypertension    • Insomnia    • Mixed hyperlipidemia 10/18/2018    on a statin, but mostly due to prevention per cardiologist   • Neck pain   "  • Osteoarthritis    • Prediabetes 6/24/2020   • Spinal stenosis      Past Surgical History:   Procedure Laterality Date   • ABDOMINAL SURGERY     • BACK SURGERY  1990    (total of 8 back surgeries) [facility]son   • BRAVO PROCEDURE N/A 9/19/2018    Procedure: ESOPHAGOGASTRODUODENOSCOPY AND WILDER;  Surgeon: Chadwick Mobley MD;  Location:  [facility];  Service: Gastroenterology   • CARDIAC CATHETERIZATION     • CARDIOVASCULAR STRESS TEST     • COLONOSCOPY  2010   • CORONARY ANGIOPLASTY WITH STENT PLACEMENT     • DIAGNOSTIC LAPAROSCOPY N/A 6/25/2020    Procedure: DIAGNOSTIC LAPAROSCOPY, EVACUATION OF INTRA-ABDOMINAL HEMATOMA;  Surgeon: Sonya Barr MD;  Location:  [facility];  Service: General;  Laterality: N/A;   • ELBOW PROCEDURE     • ENDOSCOPY      2018 EGD report from Dr. Mobley reviewed, \"Large hiatal hernia\", GE junction 40 cm.  Clinical photos from this EGD reviewed, but could not see a picture of the hernia.   • ENDOSCOPY N/A 6/23/2020    Procedure: ESOPHAGOGASTRODUODENOSCOPY;  Surgeon: Sonya Barr MD;  Location:  [facility];  Service: Bariatric;  Laterality: N/A;   • GASTRIC SLEEVE LAPAROSCOPIC N/A 6/23/2020    Procedure: GASTRIC SLEEVE LAPAROSCOPIC;  Surgeon: Sonya Barr MD;  Location:  [facility];  Service: Bariatric;  Laterality: N/A;   • HERNIA REPAIR      incisional from anterior spinal fusion incision, repaired with mesh, performed in [place]   • JOINT REPLACEMENT Left    • LAPAROSCOPIC CHOLECYSTECTOMY      stones, in [place], [place] Dr. Arana   • MOUTH SURGERY     • NECK SURGERY     • OTHER SURGICAL HISTORY      elbow surgery   • PARAESOPHAGEAL HERNIA REPAIR N/A 6/23/2020    Procedure: PARAESOPHAGEAL HERNIA REPAIR LAPAROSCOPIC;  Surgeon: Sonya Barr MD;  Location:  [facility];  Service: Bariatric;  Laterality: N/A;   • SPINAL CORD STIMULATOR IMPLANT  2015    and removal   • TOTAL KNEE ARTHROPLASTY Left      No outpatient medications have been marked as taking for the " 20 encounter (Appointment) with Sonya Barr MD.     Allergies   Allergen Reactions   • Penicillins Hives     Has tolerated Keflex without issue   • Tape Rash     Silk tape caused skin blisters        Social History     Socioeconomic History   • Marital status:      Spouse name: Not on file   • Number of children: Not on file   • Years of education: Not on file   • Highest education level: Not on file   Tobacco Use   • Smoking status: Former Smoker     Packs/day: 2.00     Years: 35.00     Pack years: 70.00     Types: Cigarettes     Last attempt to quit: 10/31/2019     Years since quittin.7   • Smokeless tobacco: Never Used   Substance and Sexual Activity   • Alcohol use: Not Currently     Frequency: 2-4 times a month     Drinks per session: 1 or 2     Comment: formerly alcoholic   • Drug use: Yes     Types: Marijuana     Comment: smokes marijuana 2-3x per month.  last use was 1 month ago.   • Sexual activity: Defer   Social History Narrative    Stays home due to disability for back, heart disease.  Formerly in commercial constructions.  Lives wife and 1 child.        There were no vitals taken for this visit.    Physical Exam   Constitutional: He is oriented to person, place, and time. He appears well-developed and well-nourished. No distress.   HENT:   Head: Normocephalic and atraumatic.   Mouth/Throat: No oropharyngeal exudate.   Pulmonary/Chest: Effort normal. No respiratory distress.   Abdominal:   Incisions well-healing withou staples.   Neurological: He is oriented to person, place, and time.   Skin: No rash noted. He is not diaphoretic.   Psychiatric: He has a normal mood and affect. His behavior is normal.         Assessment:  1 month s/p LSG/ paraesophageal hernia repair by Dr. Barr on 20, diagnostic laparoscopy/ evacuation of intraabdominal hematoma 20      Plan:  Doing well.  Continue to advance diet per manual.  Continue protein >70g/day.  Encouraged good food  choices - high protein, low carb.  Continue routine exercise.  Routine bariatric labs ordered.  Continue vitamins w/ adjustments pending lab results.  Call w/ problems/concerns.    I have asked him to learn how much protein is in the foods that he eats in addition to protein shakes.    This visit was conducted as a video visit, in an effort to limit spread of the novel coronavirus during the 2020 pandemic.        The patient was instructed to follow up in 2 months, sooner if needed.     Sonya Barr MD

## 2020-09-14 ENCOUNTER — OFFICE VISIT (OUTPATIENT)
Dept: FAMILY MEDICINE CLINIC | Facility: CLINIC | Age: 55
End: 2020-09-14

## 2020-09-14 VITALS
DIASTOLIC BLOOD PRESSURE: 76 MMHG | SYSTOLIC BLOOD PRESSURE: 118 MMHG | BODY MASS INDEX: 36.94 KG/M2 | TEMPERATURE: 97.3 F | HEART RATE: 64 BPM | WEIGHT: 258 LBS | OXYGEN SATURATION: 95 % | HEIGHT: 70 IN

## 2020-09-14 DIAGNOSIS — M54.12 CERVICAL RADICULOPATHY AT C5: ICD-10-CM

## 2020-09-14 DIAGNOSIS — M54.6 ACUTE BILATERAL THORACIC BACK PAIN: ICD-10-CM

## 2020-09-14 DIAGNOSIS — M54.41 CHRONIC RIGHT-SIDED LOW BACK PAIN WITH RIGHT-SIDED SCIATICA: ICD-10-CM

## 2020-09-14 DIAGNOSIS — M54.2 NECK PAIN: Primary | ICD-10-CM

## 2020-09-14 DIAGNOSIS — G89.29 CHRONIC RIGHT-SIDED LOW BACK PAIN WITH RIGHT-SIDED SCIATICA: ICD-10-CM

## 2020-09-14 DIAGNOSIS — R22.1 NECK MASS: ICD-10-CM

## 2020-09-14 PROCEDURE — 99214 OFFICE O/P EST MOD 30 MIN: CPT | Performed by: FAMILY MEDICINE

## 2020-09-14 RX ORDER — CYCLOBENZAPRINE HCL 5 MG
5 TABLET ORAL 3 TIMES DAILY PRN
Qty: 60 TABLET | Refills: 1 | Status: SHIPPED | OUTPATIENT
Start: 2020-09-14 | End: 2021-03-02

## 2020-09-14 RX ORDER — HYDROCODONE BITARTRATE AND ACETAMINOPHEN 5; 325 MG/1; MG/1
1 TABLET ORAL EVERY 6 HOURS PRN
Qty: 12 TABLET | Refills: 0 | Status: SHIPPED | OUTPATIENT
Start: 2020-09-14 | End: 2021-03-02

## 2020-09-14 NOTE — PROGRESS NOTES
Subjective   Dipak Marinelli is a 55 y.o. male.   Pt presents today with CC of Back Pain      History of Present Illness   1.  Patient is a 55-year-old male here complaining of neck and back pain.  He recently had bariatric surgery.  And has been doing well.  He reports that he walks at least a mile a day.  2 weeks ago, he bent down to an leashes dog, when he stood back up he felt a pinch in his mid back, he points to his mid back at approximately T10.  He states that since then he has difficulty standing up straight.  He has tried hot packs, cold packs, stretching, and he is taking the rest of his narcotic pain pills all with minimal benefit.  He has an MRI in his chart from a couple of years ago showing largely nonoperable degenerative disc disease with spinal stenosis.  He is not interested in going to see another neurosurgeon for this.  His last MRI of his neck was years ago, he states that when he looks up, he gets lightheaded and has sudden pain in his neck that radiates down his right arm.  He is concerned that his neck is worsening.  Looking up reproduces his symptoms.         The following portions of the patient's history were reviewed and updated as appropriate: allergies, current medications, past family history, past medical history, past social history, past surgical history and problem list.    Review of Systems   Constitutional: Negative for chills, fever and unexpected weight loss.   HENT: Negative for congestion and sore throat.    Eyes: Negative for blurred vision and visual disturbance.   Respiratory: Negative for cough and wheezing.    Cardiovascular: Negative for chest pain and palpitations.   Gastrointestinal: Negative for abdominal pain and diarrhea.   Endocrine: Negative for cold intolerance and heat intolerance.   Genitourinary: Negative for dysuria.   Musculoskeletal: Positive for back pain, neck pain and neck stiffness. Negative for arthralgias, gait problem and joint swelling.    Neurological: Positive for light-headedness. Negative for dizziness, seizures, syncope, facial asymmetry and headache.   Psychiatric/Behavioral: Negative for self-injury, suicidal ideas and depressed mood.       Objective   Physical Exam  Vitals signs and nursing note reviewed.   Constitutional:       Appearance: Normal appearance.   HENT:      Head: Normocephalic and atraumatic.   Eyes:      Extraocular Movements: Extraocular movements intact.      Conjunctiva/sclera: Conjunctivae normal.      Pupils: Pupils are equal, round, and reactive to light.   Neck:      Musculoskeletal: Normal range of motion and neck supple.      Comments: Muscle spasm in his right neck, there is a non-tender lump in his right neck, perhaps a lipoma, I do not recall this being there in the past.  He thinks that it is been there for years.    Cardiovascular:      Rate and Rhythm: Normal rate and regular rhythm.   Musculoskeletal:      Comments: Shoulders, elbows, and wrists with full range of motion and 5/5 strength bilaterally. DTRs symmetrical(2/4), negative Rafaela sign.    Neurological:      Mental Status: He is alert.      Comments: Positive Spurling.  When he looks up into the left, it radiates pain down his right shoulder to about his deltoid, C5 distribution suspected.           Assessment/Plan   Dipak was seen today for back pain.    Diagnoses and all orders for this visit:    Neck pain  -     XR Spine Cervical 2 or 3 View; Future  -     HYDROcodone-acetaminophen (Norco) 5-325 MG per tablet; Take 1 tablet by mouth Every 6 (Six) Hours As Needed for Moderate Pain .  C5 radiculopathy with Spurling.  He denies difficulty walking, loss of bowel/bladder, or electric shock type pain.  Will consider MRI pending on what the cervical spine shows.  I recommend following up in a few days for manipulation.  He is agreeable with the plan.  Neck mass  This mass in his right neck is nontender, I do not suspect that it is a lymph node.  Perhaps  a lipoma, it is nonpulsatile.  He believes it is been there for years, I had not noticed it the past that I can remember.  Approximately 2.5 cm in diameter and soft like a lipoma.  Lipoma suspected  Acute bilateral thoracic back pain    Chronic right-sided low back pain with right-sided sciatica    Cervical radiculopathy at C5  -     cyclobenzaprine (FLEXERIL) 5 MG tablet; Take 1 tablet by mouth 3 (Three) Times a Day As Needed for Muscle Spasms.                 Patient's Body mass index is 37.02 kg/m². BMI is above normal parameters. Recommendations include: exercise counseling and nutrition counseling.

## 2020-09-17 ENCOUNTER — TELEPHONE (OUTPATIENT)
Dept: FAMILY MEDICINE CLINIC | Facility: CLINIC | Age: 55
End: 2020-09-17

## 2020-09-17 ENCOUNTER — HOSPITAL ENCOUNTER (OUTPATIENT)
Dept: GENERAL RADIOLOGY | Facility: HOSPITAL | Age: 55
Discharge: HOME | End: 2020-09-17
Admitting: FAMILY MEDICINE

## 2020-09-17 DIAGNOSIS — M54.2 NECK PAIN: ICD-10-CM

## 2020-09-17 PROCEDURE — 72040 X-RAY EXAM NECK SPINE 2-3 VW: CPT

## 2020-09-17 PROCEDURE — 72040 X-RAY EXAM NECK SPINE 2-3 VW: CPT | Performed by: RADIOLOGY

## 2020-09-17 NOTE — TELEPHONE ENCOUNTER
----- Message from Pilo Lock DO sent at 9/17/2020  9:13 AM EDT -----  No acute findings.  How are you feeling?    Phone has calling restrictions.     Phone has calling restrictions.     Phone has calling restrictions.     Mailed letter for patient to call office.

## 2020-09-22 ENCOUNTER — OFFICE VISIT (OUTPATIENT)
Dept: FAMILY MEDICINE CLINIC | Facility: CLINIC | Age: 55
End: 2020-09-22

## 2020-09-22 DIAGNOSIS — M48.02 SPINAL STENOSIS OF CERVICAL REGION: ICD-10-CM

## 2020-09-22 DIAGNOSIS — M54.12 CERVICAL RADICULOPATHY AT C6: ICD-10-CM

## 2020-09-22 DIAGNOSIS — M54.2 NECK PAIN: Primary | ICD-10-CM

## 2020-09-22 DIAGNOSIS — M50.30 DEGENERATIVE DISC DISEASE, CERVICAL: ICD-10-CM

## 2020-09-22 DIAGNOSIS — Z98.1 STATUS POST CERVICAL SPINAL FUSION: ICD-10-CM

## 2020-09-22 PROBLEM — Z72.0 TOBACCO ABUSE: Status: RESOLVED | Noted: 2018-10-18 | Resolved: 2020-09-22

## 2020-09-22 PROCEDURE — 99442 PR PHYS/QHP TELEPHONE EVALUATION 11-20 MIN: CPT | Performed by: FAMILY MEDICINE

## 2020-09-22 NOTE — PROGRESS NOTES
You have chosen to receive care through a telephone visit. Do you consent to use a telephone visit for your medical care today? Yes Yes    Patient is a 55-year-old male with past medical history of C5 and C6 anterior fusion who recently had x-ray.  No acute findings seen.  He is having significant neck pain.  With radiation, and radiation is worse with looking up.  He would like further evaluation.  He denies recent trauma.  If he does not move, his pain is controllable.  He requests an MRI.  He denies weakness, loss of bowel/bladder, or inability to walk.  Review of Systems   Constitutional: Negative for chills, fever and unexpected weight loss.   HENT: Negative for congestion and sore throat.    Eyes: Negative for blurred vision and visual disturbance.   Respiratory: Negative for cough and wheezing.    Cardiovascular: Negative for chest pain and palpitations.   Gastrointestinal: Negative for abdominal pain and diarrhea.   Endocrine: Negative for cold intolerance and heat intolerance.   Genitourinary: Negative for dysuria.   Musculoskeletal: Negative for arthralgias and neck stiffness.   Neurological: Negative for dizziness, seizures and syncope.   Psychiatric/Behavioral: Negative for self-injury, suicidal ideas and depressed mood.        Assessment/Plan   Dipak was seen today for neck pain.    Diagnoses and all orders for this visit:    Neck pain  -     MRI Cervical Spine Without Contrast; Future  With continued neck pain with history of cervical fusion, radicular symptoms, and concerns of spinal stenosis, recommend repeat MRI.  X-ray showed no acute findings.  Status post cervical spinal fusion  -     MRI Cervical Spine Without Contrast; Future    Degenerative disc disease, cervical  -     MRI Cervical Spine Without Contrast; Future    Cervical radiculopathy at C6  -     MRI Cervical Spine Without Contrast; Future    Spinal stenosis of cervical region  -     MRI Cervical Spine Without Contrast; Future    I spent  11 minutes discussing his case with him.

## 2020-09-30 ENCOUNTER — APPOINTMENT (OUTPATIENT)
Dept: MRI IMAGING | Facility: HOSPITAL | Age: 55
End: 2020-09-30

## 2020-10-07 ENCOUNTER — HOSPITAL ENCOUNTER (OUTPATIENT)
Dept: MRI IMAGING | Facility: HOSPITAL | Age: 55
Discharge: HOME | End: 2020-10-07
Admitting: FAMILY MEDICINE

## 2020-10-07 DIAGNOSIS — M54.2 NECK PAIN: ICD-10-CM

## 2020-10-07 DIAGNOSIS — M50.30 DEGENERATIVE DISC DISEASE, CERVICAL: ICD-10-CM

## 2020-10-07 DIAGNOSIS — M54.12 CERVICAL RADICULOPATHY AT C6: ICD-10-CM

## 2020-10-07 DIAGNOSIS — M48.02 SPINAL STENOSIS OF CERVICAL REGION: ICD-10-CM

## 2020-10-07 DIAGNOSIS — Z98.1 STATUS POST CERVICAL SPINAL FUSION: ICD-10-CM

## 2020-10-07 PROCEDURE — 72141 MRI NECK SPINE W/O DYE: CPT

## 2020-10-07 PROCEDURE — 72141 MRI NECK SPINE W/O DYE: CPT | Performed by: RADIOLOGY

## 2020-10-08 ENCOUNTER — TELEPHONE (OUTPATIENT)
Dept: FAMILY MEDICINE CLINIC | Facility: CLINIC | Age: 55
End: 2020-10-08

## 2020-10-09 ENCOUNTER — TELEPHONE (OUTPATIENT)
Dept: FAMILY MEDICINE CLINIC | Facility: CLINIC | Age: 55
End: 2020-10-09

## 2020-10-09 NOTE — TELEPHONE ENCOUNTER
----- Message from Pilo Lock DO sent at 10/8/2020  8:08 AM EDT -----  Your MRI of your neck looks okay.  You have degenerative disc changes throughout your C-spine, but no cervical disc herniations were seen.  No disc bulging or spinal stenosis seen.      Left a message to return call.    Patient notified & verbalized understanding.

## 2020-11-03 DIAGNOSIS — K21.00 GERD WITH ESOPHAGITIS: ICD-10-CM

## 2020-11-04 RX ORDER — PANTOPRAZOLE SODIUM 40 MG/1
TABLET, DELAYED RELEASE ORAL
Qty: 90 TABLET | Refills: 0 | Status: SHIPPED | OUTPATIENT
Start: 2020-11-04 | End: 2021-03-02 | Stop reason: SDUPTHER

## 2021-03-02 ENCOUNTER — OFFICE VISIT (OUTPATIENT)
Dept: FAMILY MEDICINE CLINIC | Facility: CLINIC | Age: 56
End: 2021-03-02

## 2021-03-02 VITALS
HEIGHT: 70 IN | OXYGEN SATURATION: 97 % | TEMPERATURE: 96.6 F | BODY MASS INDEX: 31.35 KG/M2 | WEIGHT: 219 LBS | HEART RATE: 65 BPM | DIASTOLIC BLOOD PRESSURE: 86 MMHG | SYSTOLIC BLOOD PRESSURE: 130 MMHG

## 2021-03-02 DIAGNOSIS — Z00.00 ANNUAL PHYSICAL EXAM: Primary | ICD-10-CM

## 2021-03-02 DIAGNOSIS — K21.00 GERD WITH ESOPHAGITIS: ICD-10-CM

## 2021-03-02 DIAGNOSIS — Z86.79 HISTORY OF ANGINA PECTORIS: ICD-10-CM

## 2021-03-02 DIAGNOSIS — J41.0 SIMPLE CHRONIC BRONCHITIS (HCC): ICD-10-CM

## 2021-03-02 DIAGNOSIS — I25.10 CORONARY ARTERY DISEASE INVOLVING NATIVE CORONARY ARTERY OF NATIVE HEART WITHOUT ANGINA PECTORIS: ICD-10-CM

## 2021-03-02 PROCEDURE — 99396 PREV VISIT EST AGE 40-64: CPT | Performed by: FAMILY MEDICINE

## 2021-03-02 RX ORDER — PANTOPRAZOLE SODIUM 40 MG/1
40 TABLET, DELAYED RELEASE ORAL DAILY
Qty: 90 TABLET | Refills: 0 | Status: SHIPPED | OUTPATIENT
Start: 2021-03-02 | End: 2021-03-18 | Stop reason: SDUPTHER

## 2021-03-02 RX ORDER — ALBUTEROL SULFATE 2.5 MG/3ML
2.5 SOLUTION RESPIRATORY (INHALATION) EVERY 4 HOURS PRN
Qty: 50 EACH | Refills: 2 | Status: SHIPPED | OUTPATIENT
Start: 2021-03-02 | End: 2021-09-02

## 2021-03-02 RX ORDER — ALBUTEROL SULFATE 90 UG/1
1 AEROSOL, METERED RESPIRATORY (INHALATION) EVERY 4 HOURS PRN
Qty: 18 G | Refills: 3 | Status: SHIPPED | OUTPATIENT
Start: 2021-03-02 | End: 2021-09-02

## 2021-03-02 RX ORDER — NITROGLYCERIN 0.4 MG/1
0.4 TABLET SUBLINGUAL
Qty: 30 TABLET | Refills: 1 | Status: SHIPPED | OUTPATIENT
Start: 2021-03-02

## 2021-03-02 NOTE — PROGRESS NOTES
Subjective   Dipak Marinelli is a 55 y.o. male.   Pt presents today with CC of Annual Exam      History of Present Illness   1.  Patient is a 55-year-old male here for physical exam.  He had gastric sleeve surgery late last year and has lost 55 pounds.  He reports he feels much better and has subsequently stopped taking most of his medications.  He has discontinued Ranexa, Imdur, lisinopril and metoprolol despite their cardiac indications.  He has no interest in restarting these medications because these medications made him feel tired and irritable.  He has no other concerns today.  #2 he has GERD with esophagitis for which he takes pantoprazole 40 mg daily as needed.  He states he takes this medication for couple weeks at a time when needed.  #3 patient has coronary artery disease.  He denies any chest pain or shortness of breath in the recent past.  He has stopped all of his medications, as above.  He is agreeable to labs today.  He is no longer following with cardiology.  #4 he has history of simple chronic bronchitis.  He quit smoking last year and has been doing well.  He rarely uses albuterol.       The following portions of the patient's history were reviewed and updated as appropriate: allergies, current medications, past family history, past medical history, past social history, past surgical history and problem list.    Review of Systems   Constitutional: Negative for chills, fever and unexpected weight loss.   HENT: Negative for congestion and sore throat.    Eyes: Negative for blurred vision and visual disturbance.   Respiratory: Negative for cough and wheezing.    Cardiovascular: Negative for chest pain and palpitations.   Gastrointestinal: Negative for abdominal pain and diarrhea.   Endocrine: Negative for cold intolerance and heat intolerance.   Genitourinary: Negative for dysuria.   Musculoskeletal: Negative for arthralgias and neck stiffness.   Neurological: Negative for dizziness, seizures and  syncope.   Psychiatric/Behavioral: Negative for self-injury, suicidal ideas and depressed mood.       Objective   Physical Exam  Vitals signs and nursing note reviewed.   Constitutional:       Appearance: He is well-developed.   HENT:      Head: Normocephalic and atraumatic.      Right Ear: External ear normal.      Left Ear: External ear normal.      Nose: Nose normal.   Eyes:      Conjunctiva/sclera: Conjunctivae normal.      Pupils: Pupils are equal, round, and reactive to light.   Neck:      Musculoskeletal: Normal range of motion and neck supple.   Cardiovascular:      Rate and Rhythm: Normal rate and regular rhythm.      Heart sounds: Normal heart sounds.   Pulmonary:      Effort: Pulmonary effort is normal.      Breath sounds: Normal breath sounds.   Abdominal:      General: Bowel sounds are normal.      Palpations: Abdomen is soft.   Skin:     General: Skin is warm and dry.   Neurological:      Mental Status: He is alert and oriented to person, place, and time.   Psychiatric:         Behavior: Behavior normal.           Assessment/Plan   Diagnoses and all orders for this visit:    1. Annual physical exam (Primary)  Patient Counseling:  --Nutrition: Stressed importance of moderation in sodium/caffeine intake, saturated fat and cholesterol, caloric balance, sufficient intake of fresh fruits, vegetables, fiber, calcium, iron, and 1 mg of folate supplement per day (for females capable of pregnancy).  --Discussed the issue of estrogen replacement, calcium supplement, and the daily use of baby aspirin.  --Exercise: Stressed the importance of regular exercise.   --Substance Abuse: Discussed cessation/primary prevention of tobacco, alcohol, or other drug use; driving or other dangerous activities under the influence; availability of treatment for abuse.    --Sexuality: Discussed sexually transmitted diseases, partner selection, use of condoms, avoidance of unintended pregnancy  and contraceptive alternatives.    --Injury prevention: Discussed safety belts, safety helmets, smoke detector, smoking near bedding or upholstery.   --Dental health: Discussed importance of regular tooth brushing, flossing, and dental visits.  --Immunizations reviewed.  --Discussed benefits of screening colonoscopy.  --After hours service discussed with patient    No major concerns today.  He seems to be doing well after weight loss surgery.  I discussed with him the reason he was taking many of the medications, including several of them being cardiac indications for his history of coronary artery disease.  He listened to what I had to say, and my logic, but in the end he would like to discontinue all of his cardiac medications.  He will keep nitroglycerin with him, but he states he has not needed it and does not believe he will.    2. GERD with esophagitis  -     pantoprazole (PROTONIX) 40 MG EC tablet; Take 1 tablet by mouth Daily.  Dispense: 90 tablet; Refill: 0  -     Magnesium  -     Lipid Panel  -     CBC Auto Differential  -     Comprehensive Metabolic Panel    3. Coronary artery disease involving native coronary artery of native heart without angina pectoris  -     nitroglycerin (NITROSTAT) 0.4 MG SL tablet; Place 1 tablet under the tongue Every 5 (Five) Minutes As Needed for Chest Pain. Take no more than 3 doses in 15 minutes.  Dispense: 30 tablet; Refill: 1  -     Comprehensive Metabolic Panel; Future  -     CBC Auto Differential; Future  -     Lipid Panel; Future  -     Magnesium; Future  -     Magnesium  -     Lipid Panel  -     CBC Auto Differential  -     Comprehensive Metabolic Panel    4. History of angina pectoris  -     nitroglycerin (NITROSTAT) 0.4 MG SL tablet; Place 1 tablet under the tongue Every 5 (Five) Minutes As Needed for Chest Pain. Take no more than 3 doses in 15 minutes.  Dispense: 30 tablet; Refill: 1  -     Magnesium  -     Lipid Panel  -     CBC Auto Differential  -     Comprehensive Metabolic Panel    5. Simple  chronic bronchitis (CMS/HCC)  -     Ventolin  (90 Base) MCG/ACT inhaler; Inhale 1 puff Every 4 (Four) Hours As Needed for Wheezing.  Dispense: 18 g; Refill: 3  -     albuterol (PROVENTIL) (2.5 MG/3ML) 0.083% nebulizer solution; Take 2.5 mg by nebulization Every 4 (Four) Hours As Needed for Wheezing.  Dispense: 50 each; Refill: 2  -     Magnesium  -     Lipid Panel  -     CBC Auto Differential  -     Comprehensive Metabolic Panel                    Patient's Body mass index is 31.42 kg/m². BMI is above normal parameters. Recommendations include: exercise counseling and nutrition counseling.

## 2021-03-03 LAB
ALBUMIN SERPL-MCNC: 3.9 G/DL (ref 3.5–5.2)
ALBUMIN/GLOB SERPL: 2.1 G/DL
ALP SERPL-CCNC: 55 U/L (ref 39–117)
ALT SERPL-CCNC: 8 U/L (ref 1–41)
AST SERPL-CCNC: 7 U/L (ref 1–40)
BASOPHILS # BLD AUTO: 0.08 10*3/MM3 (ref 0–0.2)
BASOPHILS NFR BLD AUTO: 0.8 % (ref 0–1.5)
BILIRUB SERPL-MCNC: 0.4 MG/DL (ref 0–1.2)
BUN SERPL-MCNC: 6 MG/DL (ref 6–20)
BUN/CREAT SERPL: 10.2 (ref 7–25)
CALCIUM SERPL-MCNC: 8.7 MG/DL (ref 8.6–10.5)
CHLORIDE SERPL-SCNC: 107 MMOL/L (ref 98–107)
CHOLEST SERPL-MCNC: 148 MG/DL (ref 0–200)
CO2 SERPL-SCNC: 25.5 MMOL/L (ref 22–29)
CREAT SERPL-MCNC: 0.59 MG/DL (ref 0.76–1.27)
EOSINOPHIL # BLD AUTO: 0.38 10*3/MM3 (ref 0–0.4)
EOSINOPHIL NFR BLD AUTO: 3.7 % (ref 0.3–6.2)
ERYTHROCYTE [DISTWIDTH] IN BLOOD BY AUTOMATED COUNT: 13.2 % (ref 12.3–15.4)
GLOBULIN SER CALC-MCNC: 1.9 GM/DL
GLUCOSE SERPL-MCNC: 96 MG/DL (ref 65–99)
HCT VFR BLD AUTO: 41.4 % (ref 37.5–51)
HDLC SERPL-MCNC: 43 MG/DL (ref 40–60)
HGB BLD-MCNC: 13.7 G/DL (ref 13–17.7)
IMM GRANULOCYTES # BLD AUTO: 0.03 10*3/MM3 (ref 0–0.05)
IMM GRANULOCYTES NFR BLD AUTO: 0.3 % (ref 0–0.5)
IMP & REVIEW OF LAB RESULTS: NORMAL
LDLC SERPL CALC-MCNC: 92 MG/DL (ref 0–100)
LYMPHOCYTES # BLD AUTO: 2.69 10*3/MM3 (ref 0.7–3.1)
LYMPHOCYTES NFR BLD AUTO: 26.4 % (ref 19.6–45.3)
MAGNESIUM SERPL-MCNC: 2.2 MG/DL (ref 1.6–2.6)
MCH RBC QN AUTO: 28.4 PG (ref 26.6–33)
MCHC RBC AUTO-ENTMCNC: 33.1 G/DL (ref 31.5–35.7)
MCV RBC AUTO: 85.9 FL (ref 79–97)
MONOCYTES # BLD AUTO: 0.54 10*3/MM3 (ref 0.1–0.9)
MONOCYTES NFR BLD AUTO: 5.3 % (ref 5–12)
NEUTROPHILS # BLD AUTO: 6.47 10*3/MM3 (ref 1.7–7)
NEUTROPHILS NFR BLD AUTO: 63.5 % (ref 42.7–76)
NRBC BLD AUTO-RTO: 0 /100 WBC (ref 0–0.2)
PLATELET # BLD AUTO: 240 10*3/MM3 (ref 140–450)
POTASSIUM SERPL-SCNC: 4.2 MMOL/L (ref 3.5–5.2)
PROT SERPL-MCNC: 5.8 G/DL (ref 6–8.5)
RBC # BLD AUTO: 4.82 10*6/MM3 (ref 4.14–5.8)
SODIUM SERPL-SCNC: 140 MMOL/L (ref 136–145)
TRIGL SERPL-MCNC: 66 MG/DL (ref 0–150)
VLDLC SERPL CALC-MCNC: 13 MG/DL (ref 5–40)
WBC # BLD AUTO: 10.19 10*3/MM3 (ref 3.4–10.8)

## 2021-03-04 ENCOUNTER — TELEPHONE (OUTPATIENT)
Dept: FAMILY MEDICINE CLINIC | Facility: CLINIC | Age: 56
End: 2021-03-04

## 2021-03-04 NOTE — TELEPHONE ENCOUNTER
----- Message from Pilo Lock DO sent at 3/3/2021  6:46 PM EST -----  No significant concerns on your blood work.  Because your protein levels were a little low, we may need to recheck at follow-up to screen for problems with your surgery.      Patient notified & verbalized understanding.

## 2021-03-16 ENCOUNTER — BULK ORDERING (OUTPATIENT)
Dept: CASE MANAGEMENT | Facility: OTHER | Age: 56
End: 2021-03-16

## 2021-03-16 DIAGNOSIS — Z23 IMMUNIZATION DUE: ICD-10-CM

## 2021-03-18 ENCOUNTER — OFFICE VISIT (OUTPATIENT)
Dept: FAMILY MEDICINE CLINIC | Facility: CLINIC | Age: 56
End: 2021-03-18

## 2021-03-18 VITALS
RESPIRATION RATE: 16 BRPM | TEMPERATURE: 97.1 F | SYSTOLIC BLOOD PRESSURE: 121 MMHG | HEIGHT: 70 IN | HEART RATE: 85 BPM | BODY MASS INDEX: 30.64 KG/M2 | OXYGEN SATURATION: 97 % | DIASTOLIC BLOOD PRESSURE: 80 MMHG | WEIGHT: 214 LBS

## 2021-03-18 DIAGNOSIS — G89.29 CHRONIC BILATERAL LOW BACK PAIN WITHOUT SCIATICA: Primary | ICD-10-CM

## 2021-03-18 DIAGNOSIS — K21.00 GERD WITH ESOPHAGITIS: ICD-10-CM

## 2021-03-18 DIAGNOSIS — M54.50 CHRONIC BILATERAL LOW BACK PAIN WITHOUT SCIATICA: Primary | ICD-10-CM

## 2021-03-18 PROCEDURE — 99213 OFFICE O/P EST LOW 20 MIN: CPT | Performed by: FAMILY MEDICINE

## 2021-03-18 RX ORDER — PANTOPRAZOLE SODIUM 40 MG/1
40 TABLET, DELAYED RELEASE ORAL DAILY
Qty: 90 TABLET | Refills: 2 | Status: SHIPPED | OUTPATIENT
Start: 2021-03-18 | End: 2021-09-02 | Stop reason: SDUPTHER

## 2021-03-18 NOTE — PROGRESS NOTES
Subjective   Dipak Marinelli is a 55 y.o. male.   Pt presents today with CC of Back Pain (2 week follow up)      History of Present Illness   Patient is here to follow-up on back pain.  He states that despite weight loss, he has not improving.  He has done fair with manipulation in the past.  He has been on narcotics in the past that have not been helpful.  [place]  He has not tried this in the past though he is not interested in utilizing narcotics again.  Injections have never been helpful.  #2 he recently had periodic surgery.  He is still taking Protonix.  He needs refill.       The following portions of the patient's history were reviewed and updated as appropriate: allergies, current medications, past family history, past medical history, past social history, past surgical history and problem list.    Review of Systems   Constitutional: Negative for chills, fever and unexpected weight loss.   HENT: Negative for congestion and sore throat.    Eyes: Negative for blurred vision and visual disturbance.   Respiratory: Negative for cough and wheezing.    Cardiovascular: Negative for chest pain and palpitations.   Gastrointestinal: Negative for abdominal pain and diarrhea.   Endocrine: Negative for cold intolerance and heat intolerance.   Genitourinary: Negative for dysuria.   Musculoskeletal: Negative for arthralgias and neck stiffness.   Neurological: Negative for dizziness, seizures and syncope.   Psychiatric/Behavioral: Negative for self-injury, suicidal ideas and depressed mood.       Objective   Physical Exam  Vitals and nursing note reviewed.   Constitutional:       Appearance: He is well-developed.   HENT:      Head: Normocephalic and atraumatic.      Right Ear: External ear normal.      Left Ear: External ear normal.      Nose: Nose normal.   Eyes:      Conjunctiva/sclera: Conjunctivae normal.      Pupils: Pupils are equal, round, and reactive to light.    Cardiovascular:      Rate and Rhythm: Normal rate and regular rhythm.      Heart sounds: Normal heart sounds.   Pulmonary:      Effort: Pulmonary effort is normal.      Breath sounds: Normal breath sounds.   Abdominal:      General: Bowel sounds are normal.      Palpations: Abdomen is soft.   Musculoskeletal:      Cervical back: Normal range of motion and neck supple.      Comments: Hips, knees, and ankles with full range of motion and 5/5 strength bilaterally. DTRs symmetrical. Straight leg raise test negative bilaterally.     Skin:     General: Skin is warm and dry.   Neurological:      Mental Status: He is alert and oriented to person, place, and time.   Psychiatric:         Behavior: Behavior normal.           Assessment/Plan   Diagnoses and all orders for this visit:    1. Chronic bilateral low back pain without sciatica (Primary)  Recommend continuing home exercise program.  Recommend against the use of NSAIDs.  He prefers not to have a pain management referral today.  Will consider going forward.  2. GERD with esophagitis  -     pantoprazole (PROTONIX) 40 MG EC tablet; Take 1 tablet by mouth Daily.  Dispense: 90 tablet; Refill: 2    Refill sent.

## 2021-05-28 ENCOUNTER — TELEPHONE (OUTPATIENT)
Dept: FAMILY MEDICINE CLINIC | Facility: CLINIC | Age: 56
End: 2021-05-28

## 2021-05-28 NOTE — TELEPHONE ENCOUNTER
PT CALLED STATED THAT HE NEEDS FOR HIS DR TO CONTACT [facility] TO GIVE THEM THE OKAY FOR THEM TO COME  OXYGEN MACHINE.    PLEASE ADVISE.  CALL BACK:[phone]

## 2021-05-28 NOTE — TELEPHONE ENCOUNTER
Please contact [employer].  They can come  the oxygen machine.  We will discuss further at follow-up.      Order faxed & left a detailed message for patient.

## 2021-05-28 NOTE — TELEPHONE ENCOUNTER
PT CALLED STATED THAT HE NEEDS FOR HIS DR TO CONTACT [email or web address] TO GIVE THEM THE OKAY FOR THEM TO COME  OXYGEN MACHINE.    PLEASE ADVISE.  CALL BACK:[phone]      Will need an order to fax.

## 2021-05-28 NOTE — TELEPHONE ENCOUNTER
Please contact [employer].  They can come  the oxygen machine.  We will discuss further at follow-up.

## 2021-09-02 ENCOUNTER — OFFICE VISIT (OUTPATIENT)
Dept: FAMILY MEDICINE CLINIC | Facility: CLINIC | Age: 56
End: 2021-09-02

## 2021-09-02 VITALS
DIASTOLIC BLOOD PRESSURE: 64 MMHG | TEMPERATURE: 96.6 F | BODY MASS INDEX: 29.41 KG/M2 | WEIGHT: 205.4 LBS | OXYGEN SATURATION: 98 % | HEIGHT: 70 IN | HEART RATE: 74 BPM | SYSTOLIC BLOOD PRESSURE: 118 MMHG

## 2021-09-02 DIAGNOSIS — M15.9 OSTEOARTHRITIS OF MULTIPLE JOINTS, UNSPECIFIED OSTEOARTHRITIS TYPE: ICD-10-CM

## 2021-09-02 DIAGNOSIS — K21.00 GASTROESOPHAGEAL REFLUX DISEASE WITH ESOPHAGITIS WITHOUT HEMORRHAGE: Primary | ICD-10-CM

## 2021-09-02 DIAGNOSIS — M25.562 CHRONIC PAIN OF LEFT KNEE: ICD-10-CM

## 2021-09-02 DIAGNOSIS — J41.0 SIMPLE CHRONIC BRONCHITIS (HCC): ICD-10-CM

## 2021-09-02 DIAGNOSIS — M17.12 PRIMARY OSTEOARTHRITIS OF LEFT KNEE: ICD-10-CM

## 2021-09-02 DIAGNOSIS — Z98.890 S/P GASTRIC SURGERY: ICD-10-CM

## 2021-09-02 DIAGNOSIS — Z71.3 WEIGHT LOSS COUNSELING, ENCOUNTER FOR: ICD-10-CM

## 2021-09-02 DIAGNOSIS — G89.29 CHRONIC PAIN OF LEFT KNEE: ICD-10-CM

## 2021-09-02 DIAGNOSIS — R53.83 FATIGUE, UNSPECIFIED TYPE: ICD-10-CM

## 2021-09-02 DIAGNOSIS — K21.00 GERD WITH ESOPHAGITIS: ICD-10-CM

## 2021-09-02 PROCEDURE — 99214 OFFICE O/P EST MOD 30 MIN: CPT | Performed by: FAMILY MEDICINE

## 2021-09-02 RX ORDER — PANTOPRAZOLE SODIUM 40 MG/1
40 TABLET, DELAYED RELEASE ORAL DAILY
Qty: 90 TABLET | Refills: 2 | Status: SHIPPED | OUTPATIENT
Start: 2021-09-02 | End: 2021-12-03 | Stop reason: SDUPTHER

## 2021-09-02 RX ORDER — ALBUTEROL SULFATE 2.5 MG/3ML
2.5 SOLUTION RESPIRATORY (INHALATION) EVERY 4 HOURS PRN
Qty: 50 EACH | Refills: 2 | Status: SHIPPED | OUTPATIENT
Start: 2021-09-02 | End: 2021-12-03

## 2021-09-02 RX ORDER — ALBUTEROL SULFATE 90 UG/1
1 AEROSOL, METERED RESPIRATORY (INHALATION) EVERY 4 HOURS PRN
Qty: 18 G | Refills: 3 | Status: SHIPPED | OUTPATIENT
Start: 2021-09-02 | End: 2021-12-03 | Stop reason: SDUPTHER

## 2021-09-02 NOTE — PROGRESS NOTES
Subjective   Dipak Marinelli is a 56 y.o. male.   Pt presents today with CC of Abnormal Lab      History of Present Illness   1.  Patient is a 56-year-old male here to follow-up on weight loss.  He had gastric sleeve surgery.  He has done well with this.  He still takes pantoprazole 40 mg daily as prescribed by surgeon.  He would like to continue this medication.  He has not followed up recently with the bariatric team.  #2 he complains of chronic fatigue.  He would like blood work today.  #3 he has chronic bronchitis.  He no longer smokes but uses albuterol as needed.  #4 he complains of left wrist pain, left shoulder pain, left knee pain, right knee pain, he has had neck and back surgery.  He would like to discuss his left knee osteoarthritis with orthopedic surgeon.         The following portions of the patient's history were reviewed and updated as appropriate: allergies, current medications, past family history, past medical history, past social history, past surgical history and problem list.    Review of Systems   Constitutional: Negative for chills, fever and unexpected weight loss.   HENT: Negative for congestion and sore throat.    Eyes: Negative for blurred vision and visual disturbance.   Respiratory: Negative for cough and wheezing.    Cardiovascular: Negative for chest pain and palpitations.   Gastrointestinal: Positive for GERD. Negative for abdominal pain and diarrhea.   Endocrine: Negative for cold intolerance and heat intolerance.   Genitourinary: Negative for dysuria.   Musculoskeletal: Positive for arthralgias. Negative for neck stiffness.   Neurological: Negative for dizziness, seizures and syncope.   Psychiatric/Behavioral: Negative for self-injury, suicidal ideas and depressed mood.       Objective   Physical Exam  Vitals and nursing note reviewed.   Constitutional:       Appearance: He is well-developed.   HENT:      Head: Normocephalic and atraumatic.      Right Ear: External ear normal.       Left Ear: External ear normal.      Nose: Nose normal.   Eyes:      Conjunctiva/sclera: Conjunctivae normal.      Pupils: Pupils are equal, round, and reactive to light.   Cardiovascular:      Rate and Rhythm: Normal rate and regular rhythm.      Heart sounds: Normal heart sounds.   Pulmonary:      Effort: Pulmonary effort is normal.      Breath sounds: Normal breath sounds.   Abdominal:      General: Bowel sounds are normal.      Palpations: Abdomen is soft.   Musculoskeletal:      Cervical back: Normal range of motion and neck supple.      Comments: Left shoulder appears normal, left knee has arthritic change, no major abnormalities, no redness or swelling.  He has a known ganglion cyst on the dorsum of his left wrist.  He has chosen not to have this repaired.  This is the source of his pain.   Skin:     General: Skin is warm and dry.   Neurological:      Mental Status: He is alert and oriented to person, place, and time.   Psychiatric:         Behavior: Behavior normal.           Assessment/Plan   Diagnoses and all orders for this visit:    1. Gastroesophageal reflux disease with esophagitis without hemorrhage (Primary)  -     Comprehensive Metabolic Panel; Future  -     CBC Auto Differential; Future  -     pantoprazole (PROTONIX) 40 MG EC tablet; Take 1 tablet by mouth Daily.  Dispense: 90 tablet; Refill: 2    2. Weight loss counseling, encounter for  He is still doing well.  Following the diet.  3. S/P gastric surgery  -     Comprehensive Metabolic Panel; Future  -     CBC Auto Differential; Future  -     Vitamin B12; Future  -     Folate; Future  -     TSH; Future  We will get blood work today to ensure no deficiencies.  4. Fatigue, unspecified type  -     Comprehensive Metabolic Panel; Future  -     CBC Auto Differential; Future  -     Vitamin B12; Future  -     Folate; Future  -     TSH; Future    5. Simple chronic bronchitis (CMS/HCC)  -     Ventolin  (90 Base) MCG/ACT inhaler; Inhale 1 puff  Every 4 (Four) Hours As Needed for Wheezing.  Dispense: 18 g; Refill: 3  -     albuterol (PROVENTIL) (2.5 MG/3ML) 0.083% nebulizer solution; Take 2.5 mg by nebulization Every 4 (Four) Hours As Needed for Wheezing.  Dispense: 50 each; Refill: 2    6. GERD with esophagitis  -     pantoprazole (PROTONIX) 40 MG EC tablet; Take 1 tablet by mouth Daily.  Dispense: 90 tablet; Refill: 2    7. Chronic pain of left knee  He has osteoarthritis in his left knee with chronic pain.  He has osteoarthritis in multiple joints.  He complains of left shoulder, left hand(that was evaluated by orthopedics previously), left knee, right knee, they get worse with activity.  Recommend evaluation by orthopedic surgery to see if surgical options may be of benefit.  Otherwise steroid injections as needed may be his best choice.  We will have him evaluated by orthopedic surgery.  He requests a different orthopedic surgeon.  8. Primary osteoarthritis of left knee  -     Ambulatory Referral to Orthopedic Surgery    9. Osteoarthritis of multiple joints, unspecified osteoarthritis type    As above                Patient's Body mass index is 29.47 kg/m². indicating that he is overweight (BMI 25-29.9). Obesity-related health conditions include the following: GERD. Obesity is improving with lifestyle modifications. BMI is is above average; BMI management plan is completed. We discussed portion control and increasing exercise..

## 2021-09-03 LAB
ALBUMIN SERPL-MCNC: 4.1 G/DL (ref 3.5–5.2)
ALBUMIN/GLOB SERPL: 2.4 G/DL
ALP SERPL-CCNC: 58 U/L (ref 39–117)
ALT SERPL-CCNC: 12 U/L (ref 1–41)
AST SERPL-CCNC: 11 U/L (ref 1–40)
BASOPHILS # BLD AUTO: 0.06 10*3/MM3 (ref 0–0.2)
BASOPHILS NFR BLD AUTO: 0.7 % (ref 0–1.5)
BILIRUB SERPL-MCNC: <0.2 MG/DL (ref 0–1.2)
BUN SERPL-MCNC: 13 MG/DL (ref 6–20)
BUN/CREAT SERPL: 18.6 (ref 7–25)
CALCIUM SERPL-MCNC: 8.9 MG/DL (ref 8.6–10.5)
CHLORIDE SERPL-SCNC: 105 MMOL/L (ref 98–107)
CO2 SERPL-SCNC: 28.2 MMOL/L (ref 22–29)
CREAT SERPL-MCNC: 0.7 MG/DL (ref 0.76–1.27)
EOSINOPHIL # BLD AUTO: 0.42 10*3/MM3 (ref 0–0.4)
EOSINOPHIL NFR BLD AUTO: 5.2 % (ref 0.3–6.2)
ERYTHROCYTE [DISTWIDTH] IN BLOOD BY AUTOMATED COUNT: 12.3 % (ref 12.3–15.4)
FOLATE SERPL-MCNC: 9.47 NG/ML (ref 4.78–24.2)
GLOBULIN SER CALC-MCNC: 1.7 GM/DL
GLUCOSE SERPL-MCNC: 57 MG/DL (ref 65–99)
HCT VFR BLD AUTO: 38.5 % (ref 37.5–51)
HGB BLD-MCNC: 13.1 G/DL (ref 13–17.7)
IMM GRANULOCYTES # BLD AUTO: 0.02 10*3/MM3 (ref 0–0.05)
IMM GRANULOCYTES NFR BLD AUTO: 0.2 % (ref 0–0.5)
LYMPHOCYTES # BLD AUTO: 2.76 10*3/MM3 (ref 0.7–3.1)
LYMPHOCYTES NFR BLD AUTO: 34.4 % (ref 19.6–45.3)
MCH RBC QN AUTO: 30 PG (ref 26.6–33)
MCHC RBC AUTO-ENTMCNC: 34 G/DL (ref 31.5–35.7)
MCV RBC AUTO: 88.3 FL (ref 79–97)
MONOCYTES # BLD AUTO: 0.75 10*3/MM3 (ref 0.1–0.9)
MONOCYTES NFR BLD AUTO: 9.4 % (ref 5–12)
NEUTROPHILS # BLD AUTO: 4.01 10*3/MM3 (ref 1.7–7)
NEUTROPHILS NFR BLD AUTO: 50.1 % (ref 42.7–76)
NRBC BLD AUTO-RTO: 0 /100 WBC (ref 0–0.2)
PLATELET # BLD AUTO: 240 10*3/MM3 (ref 140–450)
POTASSIUM SERPL-SCNC: 4.6 MMOL/L (ref 3.5–5.2)
PROT SERPL-MCNC: 5.8 G/DL (ref 6–8.5)
RBC # BLD AUTO: 4.36 10*6/MM3 (ref 4.14–5.8)
SODIUM SERPL-SCNC: 143 MMOL/L (ref 136–145)
TSH SERPL DL<=0.005 MIU/L-ACNC: 0.59 UIU/ML (ref 0.27–4.2)
VIT B12 SERPL-MCNC: 283 PG/ML (ref 211–946)
WBC # BLD AUTO: 8.02 10*3/MM3 (ref 3.4–10.8)

## 2021-09-08 ENCOUNTER — OFFICE VISIT (OUTPATIENT)
Dept: FAMILY MEDICINE CLINIC | Facility: CLINIC | Age: 56
End: 2021-09-08

## 2021-09-08 ENCOUNTER — TELEPHONE (OUTPATIENT)
Dept: FAMILY MEDICINE CLINIC | Facility: CLINIC | Age: 56
End: 2021-09-08

## 2021-09-08 VITALS
SYSTOLIC BLOOD PRESSURE: 122 MMHG | BODY MASS INDEX: 29.01 KG/M2 | DIASTOLIC BLOOD PRESSURE: 68 MMHG | OXYGEN SATURATION: 99 % | WEIGHT: 202.6 LBS | HEART RATE: 90 BPM | TEMPERATURE: 96.9 F | HEIGHT: 70 IN

## 2021-09-08 DIAGNOSIS — M25.512 ACUTE PAIN OF LEFT SHOULDER: ICD-10-CM

## 2021-09-08 DIAGNOSIS — M99.08 SEGMENTAL AND SOMATIC DYSFUNCTION OF RIB CAGE: ICD-10-CM

## 2021-09-08 DIAGNOSIS — R07.81 RIB PAIN ON LEFT SIDE: ICD-10-CM

## 2021-09-08 DIAGNOSIS — M99.01 SEGMENTAL AND SOMATIC DYSFUNCTION OF CERVICAL REGION: ICD-10-CM

## 2021-09-08 DIAGNOSIS — M54.2 NECK PAIN: Primary | ICD-10-CM

## 2021-09-08 DIAGNOSIS — M99.02 SEGMENTAL AND SOMATIC DYSFUNCTION OF THORACIC REGION: ICD-10-CM

## 2021-09-08 PROCEDURE — 98926 OSTEOPATH MANJ 3-4 REGIONS: CPT | Performed by: FAMILY MEDICINE

## 2021-09-08 PROCEDURE — 99213 OFFICE O/P EST LOW 20 MIN: CPT | Performed by: FAMILY MEDICINE

## 2021-09-08 NOTE — PROGRESS NOTES
Subjective   Dipak Marinelli is a 56 y.o. male.   Pt presents today with CC of Shoulder Pain      History of Present Illness   Patient continues to have left shoulder, left neck, and left upper thoracic pain.  He has a long history of osteoarthritis, he is currently converting an old barn a new home.  He does not have any help doing this.  This is involved heavy lifting, if indicated, he would like manipulation today.  He does have a history of cervical spine surgery C3/4.  He denies weakness in his left arm, though he does occasionally have electric shock pain down his arm.  He denies any recent trauma other than heavy lifting.  He does not remember a specific incident that caused his current pain.  His pain is reported as 5 out of 10 at rest.  If indicated, he would like manipulation today.       The following portions of the patient's history were reviewed and updated as appropriate: allergies, current medications, past family history, past medical history, past social history, past surgical history and problem list.    Review of Systems   Constitutional: Negative for chills, fever and unexpected weight loss.   HENT: Negative for congestion and sore throat.    Eyes: Negative for blurred vision and visual disturbance.   Respiratory: Negative for cough and wheezing.    Cardiovascular: Negative for chest pain and palpitations.   Gastrointestinal: Negative for abdominal pain and diarrhea.   Endocrine: Negative for cold intolerance and heat intolerance.   Genitourinary: Negative for dysuria.   Musculoskeletal: Positive for arthralgias, back pain and neck pain. Negative for neck stiffness.   Neurological: Negative for dizziness, seizures and syncope.   Psychiatric/Behavioral: Negative for self-injury, suicidal ideas and depressed mood.       Objective   Physical Exam  Vitals and nursing note reviewed.   Constitutional:       Appearance: He is well-developed.   HENT:      Head: Normocephalic and atraumatic.       Right Ear: External ear normal.      Left Ear: External ear normal.      Nose: Nose normal.   Eyes:      Conjunctiva/sclera: Conjunctivae normal.      Pupils: Pupils are equal, round, and reactive to light.   Cardiovascular:      Rate and Rhythm: Normal rate and regular rhythm.      Heart sounds: Normal heart sounds.   Pulmonary:      Effort: Pulmonary effort is normal.      Breath sounds: Normal breath sounds.   Abdominal:      General: Bowel sounds are normal.      Palpations: Abdomen is soft.   Musculoskeletal:      Cervical back: Normal range of motion and neck supple.      Comments: Osteopathic: C7 side bent and rotated to the right, flexed, ipsilateral trapezius spasm noted as well  Left rib 1 through 6 are all inhaled  T6 is extended, side bent left and rotated left   Skin:     General: Skin is warm and dry.   Neurological:      Mental Status: He is alert and oriented to person, place, and time.      Comments: Shoulders, elbows, and wrists with full range of motion and 5/5 strength bilaterally. DTRs symmetrical.  Negative Lhermitte's, negative Spurling   Psychiatric:         Behavior: Behavior normal.           Assessment/Plan   Diagnoses and all orders for this visit:    1. Neck pain (Primary)  we discussed treatment options for neck, shoulder, and rib pain.  He has done well in the past with osteopathic manipulation.  He tolerated treatment well.  Follow-up in 2 weeks as needed.  2. Acute pain of left shoulder  as above  3. Rib pain on left side  as above  4. Segmental and somatic dysfunction of cervical region  OMT procedure, expected risks and benefits discussed with patient, who elects to proceed.  Written consent obtained. 3 techniques were used. Pt tolerated OMT well. No complications noted. Patient to do home stretches as shown in clinic today or instructed in after visit summary.    5. Segmental and somatic dysfunction of rib cage    6. Segmental and somatic dysfunction of thoracic region                     Patient's Body mass index is 29.07 kg/m². indicating that he is overweight (BMI 25-29.9). Obesity-related health conditions include the following: GERD. Obesity is improving with lifestyle modifications. BMI is is above average; BMI management plan is completed. We discussed portion control and increasing exercise..

## 2021-09-08 NOTE — TELEPHONE ENCOUNTER
----- Message from Pilo Lock DO sent at 9/8/2021 10:26 AM EDT -----  Labs all look great.  No concerns.    Patient has appointment this morning will let patient know results then.

## 2021-10-05 DIAGNOSIS — M25.512 LEFT SHOULDER PAIN, UNSPECIFIED CHRONICITY: ICD-10-CM

## 2021-10-05 DIAGNOSIS — M25.562 LEFT KNEE PAIN, UNSPECIFIED CHRONICITY: Primary | ICD-10-CM

## 2021-10-25 ENCOUNTER — OFFICE VISIT (OUTPATIENT)
Dept: ORTHOPEDIC SURGERY | Facility: CLINIC | Age: 56
End: 2021-10-25

## 2021-10-25 ENCOUNTER — HOSPITAL ENCOUNTER (OUTPATIENT)
Dept: GENERAL RADIOLOGY | Facility: HOSPITAL | Age: 56
Discharge: HOME | End: 2021-10-25
Admitting: ORTHOPAEDIC SURGERY

## 2021-10-25 VITALS — WEIGHT: 202 LBS | BODY MASS INDEX: 28.92 KG/M2 | HEIGHT: 70 IN

## 2021-10-25 DIAGNOSIS — Z96.652 PAIN DUE TO TOTAL LEFT KNEE REPLACEMENT, INITIAL ENCOUNTER (HCC): Primary | ICD-10-CM

## 2021-10-25 DIAGNOSIS — M25.562 PAIN AND SWELLING OF KNEE, LEFT: ICD-10-CM

## 2021-10-25 DIAGNOSIS — T84.84XA PAIN DUE TO TOTAL LEFT KNEE REPLACEMENT, INITIAL ENCOUNTER (HCC): Primary | ICD-10-CM

## 2021-10-25 DIAGNOSIS — M25.512 LEFT SHOULDER PAIN, UNSPECIFIED CHRONICITY: ICD-10-CM

## 2021-10-25 DIAGNOSIS — M25.532 WRIST PAIN, LEFT: ICD-10-CM

## 2021-10-25 DIAGNOSIS — M25.562 LEFT KNEE PAIN, UNSPECIFIED CHRONICITY: ICD-10-CM

## 2021-10-25 DIAGNOSIS — M25.462 PAIN AND SWELLING OF KNEE, LEFT: ICD-10-CM

## 2021-10-25 PROCEDURE — 99203 OFFICE O/P NEW LOW 30 MIN: CPT | Performed by: ORTHOPAEDIC SURGERY

## 2021-10-25 PROCEDURE — 73562 X-RAY EXAM OF KNEE 3: CPT

## 2021-10-25 PROCEDURE — 73110 X-RAY EXAM OF WRIST: CPT

## 2021-10-25 PROCEDURE — 73562 X-RAY EXAM OF KNEE 3: CPT | Performed by: RADIOLOGY

## 2021-10-25 PROCEDURE — 73110 X-RAY EXAM OF WRIST: CPT | Performed by: RADIOLOGY

## 2021-10-25 NOTE — PROGRESS NOTES
New Patient Visit      Patient: Dipak Marinelli  YOB: 1965  Date of Encounter: 10/25/2021        Chief Complaint:   Chief Complaint   Patient presents with   • Left Knee - Initial Evaluation, Pain           HPI:   Dipak Marinelli, 56 y.o. male, referred by Pilo Lock,  presents with primary complaint of left knee pain.  He underwent left total knee replacement in [place] in 2014.  He reports that did not do well immediately postoperatively and continues to struggle with left knee pain.  He has not experienced fever or chills.  Does not localize significant swelling to his left knee.  He does report that he has mild leg length discrepancy.  He has been evaluated [facility] had bone scan completed but did not return.  He occasionally feels a popping sensation in his knee which is not painful.  He has experienced nearly 100 pound weight loss attributes this to gastric sleeve.  His second complaint today is numbness in his fingers he describes all of his fingers and pain over the dorsum of his wrist he describes this is a cyst.    Active Problem List:  Patient Active Problem List   Diagnosis   • Degenerative disc disease, lumbar   • History of lumbar surgery   • Chronic bilateral low back pain   • BMI 40.0-44.9, adult (HCC)   • Generalized abdominal pain   • Non-recurrent unilateral inguinal hernia without obstruction or gangrene   • History of angina pectoris   • Coronary artery disease involving native coronary artery of native heart without angina pectoris   • Mixed hyperlipidemia   • GERD with esophagitis   • Recurrent major depressive disorder, in partial remission ([facility])   • Dyspnea on exertion   • Total knee replacement status, left   • Seasonal allergies   • Weight loss counseling, encounter for   • Debility   • Segmental and somatic dysfunction of thoracic region   • Segmental and somatic dysfunction of lumbar region   • Segmental and somatic  "dysfunction of pelvic region   • Knee effusion, right   • Fatigue   • Obesity, Class III, BMI 40-49.9 (morbid obesity) ([facility])   • Prediabetes         Past Medical History:  Past Medical History:   Diagnosis Date   • Anxiety    • Asthma     Has been on oral steroids for asthma but it has been 10 years ago   • Back pain    • GENTILE (chronic airflow obstruction) ([facility])    • Coronary artery disease     s/p 8 stents, most recently 2017.  Cardiologist is in [place].   • DDD (degenerative disc disease), cervical    • Depression    • Fatigue    • GERD (gastroesophageal reflux disease)     mostly controlled on Aciphex.  +HH.  Discovered on EGD by Dr. Mobley.    • Glaucoma    • Hypertension    • Insomnia    • Mixed hyperlipidemia 10/18/2018    on a statin, but mostly due to prevention per cardiologist   • Neck pain    • Osteoarthritis    • Prediabetes 6/24/2020   • Spinal stenosis          Past Surgical History:  Past Surgical History:   Procedure Laterality Date   • ABDOMINAL SURGERY     • BACK SURGERY  1990    (total of 8 back surgeries) [facility]   • BRAVO PROCEDURE N/A 9/19/2018    Procedure: ESOPHAGOGASTRODUODENOSCOPY AND WILDER;  Surgeon: Chadwick Mobley MD;  Location:  [facility];  Service: Gastroenterology   • CARDIAC CATHETERIZATION     • CARDIOVASCULAR STRESS TEST     • COLONOSCOPY  2010   • CORONARY ANGIOPLASTY WITH STENT PLACEMENT     • DIAGNOSTIC LAPAROSCOPY N/A 6/25/2020    Procedure: DIAGNOSTIC LAPAROSCOPY, EVACUATION OF INTRA-ABDOMINAL HEMATOMA;  Surgeon: Sonya Barr MD;  Location:  [facility];  Service: General;  Laterality: N/A;   • ELBOW PROCEDURE     • ENDOSCOPY      2018 EGD report from Dr. Mobley reviewed, \"Large hiatal hernia\", GE junction 40 cm.  Clinical photos from this EGD reviewed, but could not see a picture of the hernia.   • ENDOSCOPY N/A 6/23/2020    Procedure: ESOPHAGOGASTRODUODENOSCOPY;  Surgeon: Sonya Barr MD;  Location:  [facility];  Service: Bariatric;  Laterality: N/A;   • " GASTRIC SLEEVE LAPAROSCOPIC N/A 2020    Procedure: GASTRIC SLEEVE LAPAROSCOPIC;  Surgeon: Sonya Barr MD;  Location:  [facility];  Service: Bariatric;  Laterality: N/A;   • HERNIA REPAIR      incisional from anterior spinal fusion incision, repaired with mesh, performed in [place]   • JOINT REPLACEMENT Left    • LAPAROSCOPIC CHOLECYSTECTOMY      stones, in [place], [place] Dr. Arana   • MOUTH SURGERY     • NECK SURGERY     • OTHER SURGICAL HISTORY      elbow surgery   • PARAESOPHAGEAL HERNIA REPAIR N/A 2020    Procedure: PARAESOPHAGEAL HERNIA REPAIR LAPAROSCOPIC;  Surgeon: Sonya Barr MD;  Location:  [facility];  Service: Bariatric;  Laterality: N/A;   • SPINAL CORD STIMULATOR IMPLANT  2015    and removal   • TOTAL KNEE ARTHROPLASTY Left          Family History:  Family History   Problem Relation Age of Onset   • Heart disease Mother    • Heart disease Father    • Diabetes Father    • Cancer Father    • Gout Father    • Depression Sister    • Lupus Sister          Social History:  Social History     Socioeconomic History   • Marital status:    Tobacco Use   • Smoking status: Former Smoker     Packs/day: 2.00     Years: 35.00     Pack years: 70.00     Types: Cigarettes     Quit date: 10/31/2019     Years since quittin.9   • Smokeless tobacco: Never Used   Vaping Use   • Vaping Use: Never used   Substance and Sexual Activity   • Alcohol use: Not Currently     Comment: formerly alcoholic   • Drug use: Yes     Types: Marijuana     Comment: smokes marijuana 2-3x per month.  last use was 1 month ago.   • Sexual activity: Defer     Body mass index is 28.98 kg/m².      Medications:  Current Outpatient Medications   Medication Sig Dispense Refill   • albuterol (PROVENTIL) (2.5 MG/3ML) 0.083% nebulizer solution Take 2.5 mg by nebulization Every 4 (Four) Hours As Needed for Wheezing. 50 each 2   • nitroglycerin (NITROSTAT) 0.4 MG SL tablet Place 1 tablet under the tongue Every 5 (Five) Minutes  "As Needed for Chest Pain. Take no more than 3 doses in 15 minutes. 30 tablet 1   • pantoprazole (PROTONIX) 40 MG EC tablet Take 1 tablet by mouth Daily. 90 tablet 2   • Ventolin  (90 Base) MCG/ACT inhaler Inhale 1 puff Every 4 (Four) Hours As Needed for Wheezing. 18 g 3     No current facility-administered medications for this visit.         Allergies:  Allergies   Allergen Reactions   • Penicillins Hives     Has tolerated Keflex without issue   • Tape Rash     Silk tape caused skin blisters          Review of Systems:   Review of Systems   Constitutional: Negative.    HENT: Negative.    Eyes: Negative.    Respiratory: Negative.    Cardiovascular: Negative.    Gastrointestinal: Negative.    Endocrine: Negative.    Genitourinary: Negative.    Musculoskeletal: Positive for arthralgias, back pain, joint swelling and neck pain.   Skin: Negative.    Allergic/Immunologic: Negative.    Neurological: Positive for numbness.   Hematological: Negative.    Psychiatric/Behavioral: Positive for sleep disturbance.         Physical Exam:   Physical Exam  GENERAL: 56 y.o. male, alert and oriented X 3 in no acute distress.   Visit Vitals  Ht 177.8 cm (70\")   Wt 91.6 kg (202 lb)   BMI 28.98 kg/m²         Musculoskeletal:   Examination left knee demonstrates mild effusion he demonstrates full extension flexion to 120 degrees no gross instability normal patellar tracking no surrounding erythema midline incision completely intact.    Examination of left hand compared to the right shows diminished sensation to the tips of all fingers with moderately positive Phalen sign.  Mild prominence over the dorsum of his wrist but no palpable cyst.      Radiology/Labs:     XR Wrist 3+ View Left    Result Date: 10/25/2021  Arthritic change. Overall appearance stable  This report was finalized on 10/25/2021 12:43 PM by Dr. Ryley Vanegas MD.      XR Knee 3 View Left    Result Date: 10/25/2021  Left knee prosthesis. Anatomic alignment. No acute " fracture.  This report was finalized on 10/25/2021 10:09 AM by Dr. Ryley Vanegas MD.        Radiographs of left wrist show mild arthritic changes with a full lunate angle of 90 degrees.    Radiographs of left knee show total knee arthroplasty good position alignment without obvious loosening.      Assessment & Plan:   56 y.o. male presents proximately 7 years status post left total knee replacement reports that he has not done well since his surgery.  Radiographs do not show obvious loosening bone scan does show mild increased uptake of left leg possibly tibia.  We discussed his options.  I explained the process of knee revision surgery.  After that discussion he decided he is not interested in pursuing further work-up or treatment for his left knee as he is doing relatively well.  We agreed that he should be seen in follow-up every 1 to 2 years for repeat radiographs.      Regarding his left wrist complaints I think they are degenerative unlikely to have ganglion cyst.  MRI was obtained but he did not bring.  With positive Phalen sign and diminished sensation to his hand I suspect does have at least mild carpal tunnel syndrome.  Attempted to obtain nerve conduction studies but were not successful.  Is recommended to wear a cock-up wrist brace and return in the future as needed for further discussion of possible carpal tunnel release.  He has no neck pathology and his left hand numbness may be secondary to his cervical spine disease.        ICD-10-CM ICD-9-CM   1. Pain due to total left knee replacement, initial encounter ([facility])  T84.84XA 996.77    Z96.652 V43.65     338.18   2. Pain and swelling of knee, left  M25.562 719.46    M25.462 719.06   3. Wrist pain, left  M25.532 719.43       Cc:   Pilo Lock,                 This document has been electronically signed by Silas Butler MD   October 28, 2021 17:09 EDT

## 2021-12-03 ENCOUNTER — OFFICE VISIT (OUTPATIENT)
Dept: FAMILY MEDICINE CLINIC | Facility: CLINIC | Age: 56
End: 2021-12-03

## 2021-12-03 ENCOUNTER — TELEPHONE (OUTPATIENT)
Dept: FAMILY MEDICINE CLINIC | Facility: CLINIC | Age: 56
End: 2021-12-03

## 2021-12-03 VITALS
HEIGHT: 70 IN | OXYGEN SATURATION: 99 % | TEMPERATURE: 97.5 F | SYSTOLIC BLOOD PRESSURE: 118 MMHG | WEIGHT: 184.8 LBS | HEART RATE: 77 BPM | DIASTOLIC BLOOD PRESSURE: 72 MMHG | BODY MASS INDEX: 26.45 KG/M2

## 2021-12-03 DIAGNOSIS — K40.90 LEFT INGUINAL HERNIA: Primary | ICD-10-CM

## 2021-12-03 DIAGNOSIS — J41.0 SIMPLE CHRONIC BRONCHITIS (HCC): ICD-10-CM

## 2021-12-03 DIAGNOSIS — Z86.79 HISTORY OF ANGINA PECTORIS: ICD-10-CM

## 2021-12-03 DIAGNOSIS — K21.00 GERD WITH ESOPHAGITIS: ICD-10-CM

## 2021-12-03 DIAGNOSIS — R10.32 LEFT INGUINAL PAIN: ICD-10-CM

## 2021-12-03 DIAGNOSIS — Z98.890 S/P GASTRIC SURGERY: ICD-10-CM

## 2021-12-03 DIAGNOSIS — K21.00 GASTROESOPHAGEAL REFLUX DISEASE WITH ESOPHAGITIS WITHOUT HEMORRHAGE: ICD-10-CM

## 2021-12-03 DIAGNOSIS — I25.10 CORONARY ARTERY DISEASE INVOLVING NATIVE CORONARY ARTERY OF NATIVE HEART WITHOUT ANGINA PECTORIS: ICD-10-CM

## 2021-12-03 LAB
ALBUMIN SERPL-MCNC: 4.3 G/DL (ref 3.5–5.2)
ALBUMIN/GLOB SERPL: 1.8 G/DL
ALP SERPL-CCNC: 68 U/L (ref 39–117)
ALT SERPL-CCNC: 13 U/L (ref 1–41)
AST SERPL-CCNC: 17 U/L (ref 1–40)
BASOPHILS # BLD AUTO: 0.09 10*3/MM3 (ref 0–0.2)
BASOPHILS NFR BLD AUTO: 1 % (ref 0–1.5)
BILIRUB SERPL-MCNC: 0.4 MG/DL (ref 0–1.2)
BUN SERPL-MCNC: 9 MG/DL (ref 6–20)
BUN/CREAT SERPL: 12.3 (ref 7–25)
CALCIUM SERPL-MCNC: 9.2 MG/DL (ref 8.6–10.5)
CHLORIDE SERPL-SCNC: 103 MMOL/L (ref 98–107)
CO2 SERPL-SCNC: 27.8 MMOL/L (ref 22–29)
CREAT SERPL-MCNC: 0.73 MG/DL (ref 0.76–1.27)
EOSINOPHIL # BLD AUTO: 0.42 10*3/MM3 (ref 0–0.4)
EOSINOPHIL NFR BLD AUTO: 4.5 % (ref 0.3–6.2)
ERYTHROCYTE [DISTWIDTH] IN BLOOD BY AUTOMATED COUNT: 13.4 % (ref 12.3–15.4)
FOLATE SERPL-MCNC: 8.37 NG/ML (ref 4.78–24.2)
GLOBULIN SER CALC-MCNC: 2.4 GM/DL
GLUCOSE SERPL-MCNC: 108 MG/DL (ref 65–99)
HCT VFR BLD AUTO: 39.8 % (ref 37.5–51)
HGB BLD-MCNC: 14.2 G/DL (ref 13–17.7)
IMM GRANULOCYTES # BLD AUTO: 0.02 10*3/MM3 (ref 0–0.05)
IMM GRANULOCYTES NFR BLD AUTO: 0.2 % (ref 0–0.5)
LYMPHOCYTES # BLD AUTO: 3.72 10*3/MM3 (ref 0.7–3.1)
LYMPHOCYTES NFR BLD AUTO: 40.1 % (ref 19.6–45.3)
MCH RBC QN AUTO: 30.6 PG (ref 26.6–33)
MCHC RBC AUTO-ENTMCNC: 35.7 G/DL (ref 31.5–35.7)
MCV RBC AUTO: 85.8 FL (ref 79–97)
MONOCYTES # BLD AUTO: 0.77 10*3/MM3 (ref 0.1–0.9)
MONOCYTES NFR BLD AUTO: 8.3 % (ref 5–12)
NEUTROPHILS # BLD AUTO: 4.25 10*3/MM3 (ref 1.7–7)
NEUTROPHILS NFR BLD AUTO: 45.9 % (ref 42.7–76)
NRBC BLD AUTO-RTO: 0 /100 WBC (ref 0–0.2)
PLATELET # BLD AUTO: 268 10*3/MM3 (ref 140–450)
POTASSIUM SERPL-SCNC: 4.3 MMOL/L (ref 3.5–5.2)
PROT SERPL-MCNC: 6.7 G/DL (ref 6–8.5)
RBC # BLD AUTO: 4.64 10*6/MM3 (ref 4.14–5.8)
SODIUM SERPL-SCNC: 141 MMOL/L (ref 136–145)
TSH SERPL DL<=0.005 MIU/L-ACNC: 0.7 UIU/ML (ref 0.27–4.2)
VIT B12 SERPL-MCNC: 354 PG/ML (ref 211–946)
WBC # BLD AUTO: 9.27 10*3/MM3 (ref 3.4–10.8)

## 2021-12-03 PROCEDURE — 99214 OFFICE O/P EST MOD 30 MIN: CPT | Performed by: FAMILY MEDICINE

## 2021-12-03 RX ORDER — HYDROCODONE BITARTRATE AND ACETAMINOPHEN 5; 325 MG/1; MG/1
1 TABLET ORAL EVERY 6 HOURS PRN
Qty: 30 TABLET | Refills: 0 | Status: SHIPPED | OUTPATIENT
Start: 2021-12-03 | End: 2022-02-14

## 2021-12-03 RX ORDER — HYDROCODONE BITARTRATE AND ACETAMINOPHEN 5; 325 MG/1; MG/1
TABLET ORAL
COMMUNITY
Start: 2021-11-16 | End: 2022-02-14 | Stop reason: SDUPTHER

## 2021-12-03 RX ORDER — ALBUTEROL SULFATE 2.5 MG/3ML
2.5 SOLUTION RESPIRATORY (INHALATION) EVERY 4 HOURS PRN
Qty: 50 EACH | Refills: 2 | Status: SHIPPED | OUTPATIENT
Start: 2021-12-03

## 2021-12-03 RX ORDER — ALBUTEROL SULFATE 90 UG/1
1 AEROSOL, METERED RESPIRATORY (INHALATION) EVERY 4 HOURS PRN
Qty: 18 G | Refills: 3 | Status: SHIPPED | OUTPATIENT
Start: 2021-12-03

## 2021-12-03 RX ORDER — PANTOPRAZOLE SODIUM 40 MG/1
40 TABLET, DELAYED RELEASE ORAL DAILY
Qty: 90 TABLET | Refills: 2 | Status: SHIPPED | OUTPATIENT
Start: 2021-12-03

## 2021-12-03 NOTE — PROGRESS NOTES
Subjective   Dipak Marinelli is a 56 y.o. male.   Pt presents today with CC of Weight Loss      History of Present Illness   1.  Patient recently developed an acute left inguinal hernia.  He denies problems with bowels.  He was at [facility].  Imaging can be found scanned into his chart.  He has an appointment with general surgery in [place].  #2 he is status post gastric surgery.  He has lost significant weight.  He has extra skin around his abdomen that he would like removed if indicated.  He reports redness in his skin creases.  He has been using powder to prevent rash.  #3 he has GERD for which he takes Protonix 40 mg daily.  He is doing well on his current regimen.  #4 he has coronary artery disease.  He is agreeable to blood work today.  #5 he has chronic bronchitis.  He quit smoking.  He takes albuterol as needed.  #6 he has left inguinal pain.  He states that he was sent a prescription by his surgeon for pain, his insurance would not cover it was not affordable.  He requests prescription to be written from our office.       The following portions of the patient's history were reviewed and updated as appropriate: allergies, current medications, past family history, past medical history, past social history, past surgical history and problem list.    Review of Systems   Constitutional: Negative for chills, fever and unexpected weight loss.   HENT: Negative for congestion and sore throat.    Eyes: Negative for blurred vision and visual disturbance.   Respiratory: Negative for cough and wheezing.    Cardiovascular: Negative for chest pain and palpitations.   Gastrointestinal: Negative for abdominal pain and diarrhea.   Endocrine: Negative for cold intolerance and heat intolerance.   Genitourinary: Negative for dysuria.   Musculoskeletal: Negative for arthralgias and neck stiffness.   Neurological: Negative for dizziness, seizures and syncope.   Psychiatric/Behavioral: Negative for self-injury, suicidal  ideas and depressed mood.       Objective   Physical Exam  Vitals and nursing note reviewed.   Constitutional:       Appearance: He is well-developed.   HENT:      Head: Normocephalic and atraumatic.      Right Ear: External ear normal.      Left Ear: External ear normal.      Nose: Nose normal.   Eyes:      Conjunctiva/sclera: Conjunctivae normal.      Pupils: Pupils are equal, round, and reactive to light.   Cardiovascular:      Rate and Rhythm: Normal rate and regular rhythm.      Heart sounds: Normal heart sounds.   Pulmonary:      Effort: Pulmonary effort is normal.      Breath sounds: Normal breath sounds.   Abdominal:      General: Bowel sounds are normal.      Palpations: Abdomen is soft.   Musculoskeletal:      Cervical back: Normal range of motion and neck supple.   Skin:     General: Skin is warm and dry.   Neurological:      Mental Status: He is alert and oriented to person, place, and time.   Psychiatric:         Behavior: Behavior normal.           Assessment/Plan   Diagnoses and all orders for this visit:    1. Left inguinal hernia (Primary)  -     HYDROcodone-acetaminophen (NORCO) 5-325 MG per tablet; Take 1 tablet by mouth Every 6 (Six) Hours As Needed for Moderate Pain .  Dispense: 30 tablet; Refill: 0  Reports that his pain has been consistent, not better or worse since it happened.  He has an appointment coming up with general surgery in [place].  He is concerned about network coverage with his [employer].  I recommend calling the surgeon office to find out if he would be covered.  If not, I recommend referral to one of our local general surgeons.  Dr. kelly recommended as she also does removal of excess skin.  2. Gastroesophageal reflux disease with esophagitis without hemorrhage  -     pantoprazole (PROTONIX) 40 MG EC tablet; Take 1 tablet by mouth Daily.  Dispense: 90 tablet; Refill: 2    3. GERD with esophagitis  -     pantoprazole (PROTONIX) 40 MG EC tablet; Take 1 tablet by mouth Daily.   Dispense: 90 tablet; Refill: 2    4. Coronary artery disease involving native coronary artery of native heart without angina pectoris  -     Comprehensive Metabolic Panel; Future  -     CBC Auto Differential; Future  -     TSH; Future    5. History of angina pectoris    6. Simple chronic bronchitis (HCC)  -     Ventolin  (90 Base) MCG/ACT inhaler; Inhale 1 puff Every 4 (Four) Hours As Needed for Wheezing.  Dispense: 18 g; Refill: 3  -     albuterol (PROVENTIL) (2.5 MG/3ML) 0.083% nebulizer solution; Take 2.5 mg by nebulization Every 4 (Four) Hours As Needed for Wheezing.  Dispense: 50 each; Refill: 2    7. Left inguinal pain  -     HYDROcodone-acetaminophen (NORCO) 5-325 MG per tablet; Take 1 tablet by mouth Every 6 (Six) Hours As Needed for Moderate Pain .  Dispense: 30 tablet; Refill: 0  Devendra was reviewed.  He received a prescription for narcotics about 2 weeks ago.  This was from the emergency room.  Another prescription sent from our office today for 1 time only until he is able to get in with surgery.  Refills will not be sent.  He understands.  No concerns with his hernia at this time.  Recommend nonemergent surgical repair.  8. S/P gastric surgery  -     Folate; Future  -     Vitamin B12; Future  After inguinal hernia repaired, will consider referral to remove extra skin.  Indicated because of developing rash in his skin folds.        Recommend follow-up in 3 months, sooner if needed    Patient's Body mass index is 26.52 kg/m². indicating that he is overweight (BMI 25-29.9). Obesity-related health conditions include the following: GERD. Obesity is unchanged. BMI is is above average; BMI management plan is completed. We discussed portion control and increasing exercise..

## 2021-12-03 NOTE — TELEPHONE ENCOUNTER
Caitlyn from [facility] called and asked for diagnosis for pts Norco. I advised and she verbalized understanding.

## 2021-12-06 ENCOUNTER — TELEPHONE (OUTPATIENT)
Dept: FAMILY MEDICINE CLINIC | Facility: CLINIC | Age: 56
End: 2021-12-06

## 2021-12-06 NOTE — TELEPHONE ENCOUNTER
----- Message from Pilo Lock DO sent at 12/4/2021 11:49 AM EST -----  No problems on your blood work.  Everything is remained stable.      Left voicemail.-Thanks AB

## 2021-12-09 ENCOUNTER — TELEPHONE (OUTPATIENT)
Dept: FAMILY MEDICINE CLINIC | Facility: CLINIC | Age: 56
End: 2021-12-09

## 2021-12-10 NOTE — TELEPHONE ENCOUNTER
PA for Albuterol Sulfate 0.083% neb solution sent to cover my meds by Marily HERNANDEZ. PA was denied.    Called and spoke with [facility] Caitlyn stated one box which is 15 day supply if pt uses it every 4 hours. That is all the insurance will cover, however pt will be able to get refill. Only 15 days at a time.

## 2022-02-14 ENCOUNTER — OFFICE VISIT (OUTPATIENT)
Dept: FAMILY MEDICINE CLINIC | Facility: CLINIC | Age: 57
End: 2022-02-14

## 2022-02-14 VITALS
DIASTOLIC BLOOD PRESSURE: 78 MMHG | SYSTOLIC BLOOD PRESSURE: 116 MMHG | HEIGHT: 70 IN | OXYGEN SATURATION: 100 % | HEART RATE: 79 BPM | BODY MASS INDEX: 24.82 KG/M2 | TEMPERATURE: 96.9 F | WEIGHT: 173.4 LBS

## 2022-02-14 DIAGNOSIS — M54.42 ACUTE LEFT-SIDED LOW BACK PAIN WITH BILATERAL SCIATICA: Primary | ICD-10-CM

## 2022-02-14 DIAGNOSIS — G89.29 CHRONIC BILATERAL LOW BACK PAIN WITH LEFT-SIDED SCIATICA: ICD-10-CM

## 2022-02-14 DIAGNOSIS — M54.41 ACUTE LEFT-SIDED LOW BACK PAIN WITH BILATERAL SCIATICA: Primary | ICD-10-CM

## 2022-02-14 DIAGNOSIS — M62.838 MUSCLE SPASM: ICD-10-CM

## 2022-02-14 DIAGNOSIS — M54.42 CHRONIC BILATERAL LOW BACK PAIN WITH LEFT-SIDED SCIATICA: ICD-10-CM

## 2022-02-14 DIAGNOSIS — M99.03 SEGMENTAL AND SOMATIC DYSFUNCTION OF LUMBAR REGION: ICD-10-CM

## 2022-02-14 PROCEDURE — 99214 OFFICE O/P EST MOD 30 MIN: CPT | Performed by: FAMILY MEDICINE

## 2022-02-14 PROCEDURE — 96372 THER/PROPH/DIAG INJ SC/IM: CPT | Performed by: FAMILY MEDICINE

## 2022-02-14 PROCEDURE — 98925 OSTEOPATH MANJ 1-2 REGIONS: CPT | Performed by: FAMILY MEDICINE

## 2022-02-14 RX ORDER — PREDNISONE 10 MG/1
TABLET ORAL
Qty: 21 TABLET | Refills: 0 | Status: SHIPPED | OUTPATIENT
Start: 2022-02-14

## 2022-02-14 RX ORDER — HYDROCODONE BITARTRATE AND ACETAMINOPHEN 5; 325 MG/1; MG/1
1 TABLET ORAL EVERY 6 HOURS PRN
Qty: 15 TABLET | Refills: 0 | Status: SHIPPED | OUTPATIENT
Start: 2022-02-14

## 2022-02-14 RX ORDER — DEXAMETHASONE SODIUM PHOSPHATE 4 MG/ML
8 INJECTION, SOLUTION INTRA-ARTICULAR; INTRALESIONAL; INTRAMUSCULAR; INTRAVENOUS; SOFT TISSUE ONCE
Status: COMPLETED | OUTPATIENT
Start: 2022-02-14 | End: 2022-02-14

## 2022-02-14 RX ADMIN — DEXAMETHASONE SODIUM PHOSPHATE 8 MG: 4 INJECTION, SOLUTION INTRA-ARTICULAR; INTRALESIONAL; INTRAMUSCULAR; INTRAVENOUS; SOFT TISSUE at 10:27

## 2022-02-14 NOTE — PROGRESS NOTES
Subjective   Dipak Marinelli is a 56 y.o. male.   Pt presents today with CC of Back Pain      History of Present Illness   Patient is a 56-year-old male complaining today of acute low back pain.  He is unable to stand up straight.  He has difficulty walking.  He reports that standing up straight causes a sudden jolt of pain with muscle spasm that radiates down both legs, he has chronic low back pain with history of spinal fusion.  He has chronic back problems.  He was building a pick pain over the weekend, he was carrying large piece of plywood.  He did not injure his back at the time, he states that he woke up with it yesterday morning and has not gotten any better.  He denies any loss of bowel or bladder.  He denies any weakness other than the pain makes him want to fall when it hits.  He has done well with Norco, muscle relaxers have never been beneficial.  He does well with steroids.  Would like a steroid injection today.       The following portions of the patient's history were reviewed and updated as appropriate: allergies, current medications, past family history, past medical history, past social history, past surgical history and problem list.    Review of Systems   Constitutional: Negative for chills, fever and unexpected weight loss.   HENT: Negative for congestion and sore throat.    Eyes: Negative for blurred vision and visual disturbance.   Respiratory: Negative for cough and wheezing.    Cardiovascular: Negative for chest pain and palpitations.   Gastrointestinal: Negative for abdominal pain and diarrhea.   Endocrine: Negative for cold intolerance and heat intolerance.   Genitourinary: Negative for dysuria.   Musculoskeletal: Positive for back pain. Negative for arthralgias and neck stiffness.   Neurological: Negative for dizziness, seizures and syncope.   Psychiatric/Behavioral: Negative for self-injury, suicidal ideas and depressed mood.       Objective   Physical Exam  Vitals and nursing note  reviewed.   Constitutional:       Appearance: He is well-developed.   HENT:      Head: Normocephalic and atraumatic.      Right Ear: External ear normal.      Left Ear: External ear normal.      Nose: Nose normal.   Eyes:      Conjunctiva/sclera: Conjunctivae normal.      Pupils: Pupils are equal, round, and reactive to light.   Cardiovascular:      Rate and Rhythm: Normal rate and regular rhythm.      Heart sounds: Normal heart sounds.   Pulmonary:      Effort: Pulmonary effort is normal.      Breath sounds: Normal breath sounds.   Abdominal:      General: Bowel sounds are normal.      Palpations: Abdomen is soft.   Musculoskeletal:      Cervical back: Normal range of motion and neck supple.      Comments: Hips, knees, and ankles with full range of motion and 5/5 strength bilaterally. DTRs symmetrical.  Straight leg raise equivocal bilaterally.  Pain limits straight leg raise.  Osteopathic: Exquisite tenderness at L5 on the left paraspinal musculature.  Lesion is flexed side bent rotated to the left at L5.   Skin:     General: Skin is warm and dry.   Neurological:      Mental Status: He is alert and oriented to person, place, and time.   Psychiatric:         Behavior: Behavior normal.           Assessment/Plan   Diagnoses and all orders for this visit:    1. Acute left-sided low back pain with bilateral sciatica (Primary)  -     predniSONE (DELTASONE) 10 MG tablet; 6 tabs on day 1, 5 tabs day 2, 4 tabs day 3, 3 tabs day 4, 2 tabs day 5, 1 tab day 6.  Dispense: 21 tablet; Refill: 0  -     HYDROcodone-acetaminophen (NORCO) 5-325 MG per tablet; Take 1 tablet by mouth Every 6 (Six) Hours As Needed for Mild Pain .  Dispense: 15 tablet; Refill: 0  -     dexamethasone (DECADRON) injection 8 mg  We discussed options.  Manipulation was beneficial today.  He was able to stand up straighter.  I would expect that it will take a couple weeks to recover from this because of his long history of back pain with history of surgery  with fusion.  As he does not recall exactly when he hurt it, he woke up with it, x-ray is unlikely to be needed.  Denies any trauma.  We will treat with steroid shot, 8 mg Decadron followed by a prednisone taper for 6 days.  It is my opinion that the prednisone oral carries a risk because of his gastric surgery, however, with discussion with him I think it is worth the risk as he is in a great deal of pain.  Norco sent for as needed use.  He is not to take NSAIDs while taking prednisone.  He is agreeable to plan.  UDS pending today, controlled substance agreement signed.  Follow-up as needed.  2. Chronic bilateral low back pain with left-sided sciatica  As above  3. Muscle spasm  As above  4. Segmental and somatic dysfunction of lumbar region  OMT procedure, expected risks and benefits discussed with patient, who elects to proceed.  Written consent obtained. 1 techniques were used. Pt tolerated OMT well. No complications noted. Patient to do home stretches as shown in clinic today or instructed in after visit summary.                    Patient's Body mass index is 24.88 kg/m². indicating that he is overweight (BMI 25-29.9). Patient's (Body mass index is 24.88 kg/m².) indicates that they are overweight with health conditions that include GERD . Weight is unchanged. BMI is is above average; BMI management plan is completed. We discussed portion control and increasing exercise. .

## 2022-02-17 ENCOUNTER — TRANSCRIBE ORDERS (OUTPATIENT)
Dept: ADMINISTRATIVE | Facility: HOSPITAL | Age: 57
End: 2022-02-17

## 2022-02-17 DIAGNOSIS — Z01.818 PRE-OPERATIVE CLEARANCE: Primary | ICD-10-CM

## 2022-06-17 ENCOUNTER — HOSPITAL ENCOUNTER (EMERGENCY)
Facility: HOSPITAL | Age: 57
End: 2022-06-17

## (undated) DEVICE — CAPS TRANSMTR ENDOSC PH MONTR BRAVO

## (undated) DEVICE — GLV SURG SENSICARE PI MIC PF SZ6.5 LF STRL

## (undated) DEVICE — ENDOPATH XCEL UNIVERSAL TROCAR STABLILITY SLEEVES: Brand: ENDOPATH XCEL

## (undated) DEVICE — CONTN GRAD MEAS TRIANG 32OZ BLK

## (undated) DEVICE — SUT SILK 0 SH 30IN K834H

## (undated) DEVICE — ENDOPATH XCEL BLADELESS TROCARS WITH STABILITY SLEEVES: Brand: ENDOPATH XCEL

## (undated) DEVICE — SYR LUERLOK 30CC

## (undated) DEVICE — GLV SURG DERMASSURE GRN LF PF 7.0

## (undated) DEVICE — SKIN AFFIX SURG ADHESIVE 72/CS 0.55ML: Brand: MEDLINE

## (undated) DEVICE — TISSUE RETRIEVAL SYSTEM: Brand: INZII RETRIEVAL SYSTEM

## (undated) DEVICE — SHT AIR TRANSFR COMFRT GLIDE LT LAT 40X80IN

## (undated) DEVICE — Device

## (undated) DEVICE — FRCP BX RADJAW4 NDL 2.8 240CM LG OG BX40

## (undated) DEVICE — TROCAR: Brand: KII FIOS FIRST ENTRY

## (undated) DEVICE — NDL HYPO ECLPS SFTY 18G 1 1/2IN

## (undated) DEVICE — ANTIBACTERIAL VIOLET BRAIDED (POLYGLACTIN 910), SYNTHETIC ABSORBABLE SUTURE: Brand: COATED VICRYL

## (undated) DEVICE — JP PERF DRN SIL FLT 10MM FULL: Brand: CARDINAL HEALTH

## (undated) DEVICE — GOWN,REINF,POLY,ECL,PP SLV,XL: Brand: MEDLINE

## (undated) DEVICE — POWER SHELL: Brand: SIGNIA

## (undated) DEVICE — MARYLAND JAW LAPAROSCOPIC SEALER/DIVIDER COATED: Brand: LIGASURE

## (undated) DEVICE — FLTR PLUMEPORT LAP W/CONN STRL

## (undated) DEVICE — JACKSON-PRATT 100CC BULB RESERVOIR: Brand: CARDINAL HEALTH

## (undated) DEVICE — [HIGH FLOW INSUFFLATOR,  DO NOT USE IF PACKAGE IS DAMAGED,  KEEP DRY,  KEEP AWAY FROM SUNLIGHT,  PROTECT FROM HEAT AND RADIOACTIVE SOURCES.]: Brand: PNEUMOSURE

## (undated) DEVICE — DRN PENRS 1/2X18IN LTX

## (undated) DEVICE — PK BARIATRIC 10

## (undated) DEVICE — UNDRPD COMFRT GLD DRYPAD 36X57IN

## (undated) DEVICE — THE BITE BLOCK MAXI, LATEX FREE STRAP IS USED TO PROTECT THE ENDOSCOPE INSERTION TUBE FROM BEING BITTEN BY THE PATIENT.

## (undated) DEVICE — PROXIMATE RH ROTATING HEAD SKIN STAPLERS (35 WIDE) CONTAINS 35 STAINLESS STEEL STAPLES: Brand: PROXIMATE

## (undated) DEVICE — GOWN,NON-REINFORCED,SIRUS,SET IN SLV,XXL: Brand: MEDLINE

## (undated) DEVICE — SYR CONTRL LUERLOK 10CC

## (undated) DEVICE — APPL CHLORAPREP TINTED 26ML TEAL

## (undated) DEVICE — CLMP STD 22CM DISP

## (undated) DEVICE — Device: Brand: DEFENDO AIR/WATER/SUCTION AND BIOPSY VALVE

## (undated) DEVICE — DRSNG WND BORDR/ADHS NONADHR/GZ LF 2X2IN STRL

## (undated) DEVICE — COVER,LIGHT HANDLE,FLX,1/PK: Brand: MEDLINE INDUSTRIES, INC.

## (undated) DEVICE — SPNG GZ WOVN 4X4IN 12PLY 10/BX STRL

## (undated) DEVICE — ENDOPATH 5MM ENDOSCOPIC BLUNT TIP DISSECTORS (12 POUCHES CONTAINING 3 DISSECTORS EACH): Brand: ENDOPATH

## (undated) DEVICE — INTENDED USE FOR SURGICAL MARKING ON INTACT SKIN, ALSO PROVIDES A PERMANENT METHOD OF IDENTIFYING OBJECTS IN THE OPERATING ROOM: Brand: WRITESITE® REGULAR TIP SKIN MARKER